# Patient Record
Sex: FEMALE | Race: WHITE | NOT HISPANIC OR LATINO | Employment: FULL TIME | URBAN - METROPOLITAN AREA
[De-identification: names, ages, dates, MRNs, and addresses within clinical notes are randomized per-mention and may not be internally consistent; named-entity substitution may affect disease eponyms.]

---

## 2019-02-06 ENCOUNTER — OFFICE VISIT (OUTPATIENT)
Dept: FAMILY MEDICINE | Facility: CLINIC | Age: 39
End: 2019-02-06
Payer: COMMERCIAL

## 2019-02-06 VITALS
DIASTOLIC BLOOD PRESSURE: 80 MMHG | WEIGHT: 155 LBS | HEIGHT: 58 IN | TEMPERATURE: 100 F | HEART RATE: 88 BPM | OXYGEN SATURATION: 98 % | BODY MASS INDEX: 32.54 KG/M2 | RESPIRATION RATE: 20 BRPM | SYSTOLIC BLOOD PRESSURE: 116 MMHG

## 2019-02-06 DIAGNOSIS — J02.8 SORE THROAT (VIRAL): Primary | ICD-10-CM

## 2019-02-06 DIAGNOSIS — B97.89 SORE THROAT (VIRAL): Primary | ICD-10-CM

## 2019-02-06 DIAGNOSIS — B34.9 VIRAL ILLNESS: ICD-10-CM

## 2019-02-06 LAB
CTP QC/QA: YES
S PYO RRNA THROAT QL PROBE: NEGATIVE

## 2019-02-06 PROCEDURE — 99203 PR OFFICE/OUTPT VISIT, NEW, LEVL III, 30-44 MIN: ICD-10-PCS | Mod: 25,S$GLB,, | Performed by: INTERNAL MEDICINE

## 2019-02-06 PROCEDURE — 99203 OFFICE O/P NEW LOW 30 MIN: CPT | Mod: 25,S$GLB,, | Performed by: INTERNAL MEDICINE

## 2019-02-06 PROCEDURE — 3008F PR BODY MASS INDEX (BMI) DOCUMENTED: ICD-10-PCS | Mod: CPTII,S$GLB,, | Performed by: INTERNAL MEDICINE

## 2019-02-06 PROCEDURE — 87880 POCT RAPID STREP A: ICD-10-PCS | Mod: QW,S$GLB,, | Performed by: INTERNAL MEDICINE

## 2019-02-06 PROCEDURE — 87880 STREP A ASSAY W/OPTIC: CPT | Mod: QW,S$GLB,, | Performed by: INTERNAL MEDICINE

## 2019-02-06 PROCEDURE — 96372 THER/PROPH/DIAG INJ SC/IM: CPT | Mod: S$GLB,,, | Performed by: INTERNAL MEDICINE

## 2019-02-06 PROCEDURE — 99999 PR PBB SHADOW E&M-NEW PATIENT-LVL III: CPT | Mod: PBBFAC,,, | Performed by: INTERNAL MEDICINE

## 2019-02-06 PROCEDURE — 3008F BODY MASS INDEX DOCD: CPT | Mod: CPTII,S$GLB,, | Performed by: INTERNAL MEDICINE

## 2019-02-06 PROCEDURE — 96372 PR INJECTION,THERAP/PROPH/DIAG2ST, IM OR SUBCUT: ICD-10-PCS | Mod: S$GLB,,, | Performed by: INTERNAL MEDICINE

## 2019-02-06 PROCEDURE — 99999 PR PBB SHADOW E&M-NEW PATIENT-LVL III: ICD-10-PCS | Mod: PBBFAC,,, | Performed by: INTERNAL MEDICINE

## 2019-02-06 RX ORDER — TRIAMCINOLONE ACETONIDE 40 MG/ML
40 INJECTION, SUSPENSION INTRA-ARTICULAR; INTRAMUSCULAR
Status: COMPLETED | OUTPATIENT
Start: 2019-02-06 | End: 2019-02-06

## 2019-02-06 RX ADMIN — TRIAMCINOLONE ACETONIDE 40 MG: 40 INJECTION, SUSPENSION INTRA-ARTICULAR; INTRAMUSCULAR at 01:02

## 2019-02-06 NOTE — PROGRESS NOTES
SUBJECTIVE     Chief Complaint   Patient presents with    Sore Throat    Generalized Body Aches    Fever       HPI  Crystal Flores is a 39 y.o. female with multiple medical diagnoses as listed in the medical history and problem list that presents for evaluation of URI x 2 days. Pt reports a sore throat and back pain. +fever of 101 at ~1am, chills, and night sweats. Pt has been taking Theraflu without relief of symptoms. Denies any recent travel. +sick contacts(co-workers with URI).    PAST MEDICAL HISTORY:  History reviewed. No pertinent past medical history.    PAST SURGICAL HISTORY:  Past Surgical History:   Procedure Laterality Date     SECTION      HYSTERECTOMY      TUBAL LIGATION         SOCIAL HISTORY:  Social History     Socioeconomic History    Marital status: Single     Spouse name: Not on file    Number of children: Not on file    Years of education: Not on file    Highest education level: Not on file   Social Needs    Financial resource strain: Not on file    Food insecurity - worry: Not on file    Food insecurity - inability: Not on file    Transportation needs - medical: Not on file    Transportation needs - non-medical: Not on file   Occupational History    Not on file   Tobacco Use    Smoking status: Never Smoker   Substance and Sexual Activity    Alcohol use: Not on file    Drug use: Not on file    Sexual activity: Not on file   Other Topics Concern    Not on file   Social History Narrative    Not on file       FAMILY HISTORY:  Family History   Problem Relation Age of Onset    Diabetes Maternal Grandmother     Stroke Maternal Grandmother        ALLERGIES AND MEDICATIONS: updated and reviewed.  Review of patient's allergies indicates:   Allergen Reactions    Codeine Hives     Current Outpatient Medications   Medication Sig Dispense Refill    ibuprofen (ADVIL,MOTRIN) 600 MG tablet Take 1 tablet (600 mg total) by mouth every 6 (six) hours as needed for Pain. 60 tablet 2  "    No current facility-administered medications for this visit.        ROS  Review of Systems   Constitutional: Positive for chills and fever.   HENT: Negative for hearing loss and sore throat.    Eyes: Negative for visual disturbance.   Respiratory: Negative for cough and shortness of breath.    Cardiovascular: Negative for chest pain, palpitations and leg swelling.   Gastrointestinal: Negative for abdominal pain, constipation, diarrhea, nausea and vomiting.   Genitourinary: Negative for dysuria, frequency and urgency.   Musculoskeletal: Positive for back pain. Negative for arthralgias, joint swelling and myalgias.   Skin: Negative for rash and wound.   Neurological: Negative for headaches.   Psychiatric/Behavioral: Negative for agitation and confusion. The patient is not nervous/anxious.          OBJECTIVE     Physical Exam  Vitals:    02/06/19 1325   BP: 116/80   Pulse: 88   Resp: 20   Temp: 99.6 °F (37.6 °C)    Body mass index is 32.39 kg/m².  Weight: 70.3 kg (154 lb 15.7 oz)   Height: 4' 10" (147.3 cm)     Physical Exam   Constitutional: She is oriented to person, place, and time. She appears well-developed and well-nourished. No distress.   HENT:   Head: Normocephalic and atraumatic.   Right Ear: Hearing, tympanic membrane and external ear normal.   Left Ear: Hearing, tympanic membrane and external ear normal.   Nose: Nose normal. No rhinorrhea. Right sinus exhibits no maxillary sinus tenderness and no frontal sinus tenderness. Left sinus exhibits no maxillary sinus tenderness and no frontal sinus tenderness.   Mouth/Throat: No uvula swelling. Posterior oropharyngeal erythema present. No posterior oropharyngeal edema.   Eyes: Conjunctivae and EOM are normal. Right eye exhibits no discharge. Left eye exhibits no discharge. No scleral icterus.   Neck: Normal range of motion. Neck supple. No JVD present. No tracheal deviation present.   Cardiovascular: Normal rate, regular rhythm and intact distal pulses. Exam " reveals no gallop and no friction rub.   No murmur heard.  Pulmonary/Chest: Effort normal and breath sounds normal. No respiratory distress. She has no wheezes.   Abdominal: Soft. Bowel sounds are normal. She exhibits no distension and no mass. There is no tenderness. There is no rebound and no guarding.   Musculoskeletal: Normal range of motion. She exhibits no edema, tenderness or deformity.   Neurological: She is alert and oriented to person, place, and time. She exhibits normal muscle tone. Coordination normal.   Skin: Skin is warm and dry. No rash noted. No erythema.   Psychiatric: She has a normal mood and affect. Her behavior is normal. Judgment and thought content normal.         Health Maintenance       Date Due Completion Date    Lipid Panel 1980 ---    TETANUS VACCINE 02/04/1998 ---    Influenza Vaccine 08/01/2018 ---    Pap Smear 07/02/2019 7/2/2018            ASSESSMENT     39 y.o. female with     1. Sore throat (viral)    2. Viral illness        PLAN:     1. Sore throat (viral)  - POCT Rapid Strep A; negative  - Salt water gargles    2. Viral illness  - Continue symptomatic treatment with rest, increase fluid intake, tylenol or ibuprofen PRN fever(temp >/= 100.4) or body aches. Okay to take OTC antihistamines, i.e. Bendaryl, Claritin, Allegra, etc. as needed.  - Okay to gargle with warm, salt water or use throat lozenges as needed  - triamcinolone acetonide injection 40 mg        RTC in 1-2 weeks as needed for any acute worsening of current condition or failure to improve       Isha Espinal MD  02/06/2019 1:32 PM        No Follow-up on file.

## 2019-03-07 ENCOUNTER — OFFICE VISIT (OUTPATIENT)
Dept: FAMILY MEDICINE | Facility: CLINIC | Age: 39
End: 2019-03-07
Payer: COMMERCIAL

## 2019-03-07 VITALS
HEART RATE: 78 BPM | BODY MASS INDEX: 32.39 KG/M2 | DIASTOLIC BLOOD PRESSURE: 84 MMHG | WEIGHT: 154.31 LBS | HEIGHT: 58 IN | OXYGEN SATURATION: 98 % | TEMPERATURE: 99 F | RESPIRATION RATE: 18 BRPM | SYSTOLIC BLOOD PRESSURE: 118 MMHG

## 2019-03-07 DIAGNOSIS — M62.838 NECK MUSCLE SPASM: ICD-10-CM

## 2019-03-07 DIAGNOSIS — M54.2 NECK PAIN ON LEFT SIDE: Primary | ICD-10-CM

## 2019-03-07 PROCEDURE — 99999 PR PBB SHADOW E&M-EST. PATIENT-LVL III: ICD-10-PCS | Mod: PBBFAC,,, | Performed by: INTERNAL MEDICINE

## 2019-03-07 PROCEDURE — 3008F PR BODY MASS INDEX (BMI) DOCUMENTED: ICD-10-PCS | Mod: CPTII,S$GLB,, | Performed by: INTERNAL MEDICINE

## 2019-03-07 PROCEDURE — 3008F BODY MASS INDEX DOCD: CPT | Mod: CPTII,S$GLB,, | Performed by: INTERNAL MEDICINE

## 2019-03-07 PROCEDURE — 99999 PR PBB SHADOW E&M-EST. PATIENT-LVL III: CPT | Mod: PBBFAC,,, | Performed by: INTERNAL MEDICINE

## 2019-03-07 PROCEDURE — 99214 OFFICE O/P EST MOD 30 MIN: CPT | Mod: S$GLB,,, | Performed by: INTERNAL MEDICINE

## 2019-03-07 PROCEDURE — 99214 PR OFFICE/OUTPT VISIT, EST, LEVL IV, 30-39 MIN: ICD-10-PCS | Mod: S$GLB,,, | Performed by: INTERNAL MEDICINE

## 2019-03-07 RX ORDER — CYCLOBENZAPRINE HCL 10 MG
10 TABLET ORAL 3 TIMES DAILY PRN
Qty: 30 TABLET | Refills: 0 | Status: SHIPPED | OUTPATIENT
Start: 2019-03-07 | End: 2019-03-17

## 2019-03-07 NOTE — LETTER
March 7, 2019      [facility]  [address]  [address]  [place] [address]  Phone: [phone]  Fax: [phone]       Patient: Crystal Flores   YOB: 1980  Date of Visit: 03/07/2019    To Whom It May Concern:    Sarah Flores  was at [facility] on 03/07/2019. She may return to work/school on 3/7/19 with restrictions. She can not do any heavy lifting for the next 1-2 weeks. If you have any questions or concerns, or if I can be of further assistance, please do not hesitate to contact me.    Sincerely,    Isha Espinal MD
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
## 2019-03-26 ENCOUNTER — OFFICE VISIT (OUTPATIENT)
Dept: FAMILY MEDICINE | Facility: CLINIC | Age: 39
End: 2019-03-26
Payer: COMMERCIAL

## 2019-03-26 VITALS
OXYGEN SATURATION: 97 % | TEMPERATURE: 98 F | BODY MASS INDEX: 31.47 KG/M2 | WEIGHT: 149.94 LBS | HEIGHT: 58 IN | DIASTOLIC BLOOD PRESSURE: 78 MMHG | SYSTOLIC BLOOD PRESSURE: 110 MMHG | HEART RATE: 76 BPM

## 2019-03-26 DIAGNOSIS — M50.30 DEGENERATIVE DISC DISEASE, CERVICAL: ICD-10-CM

## 2019-03-26 DIAGNOSIS — M54.2 NECK PAIN ON LEFT SIDE: Primary | ICD-10-CM

## 2019-03-26 PROCEDURE — 99999 PR PBB SHADOW E&M-EST. PATIENT-LVL III: CPT | Mod: PBBFAC,,, | Performed by: INTERNAL MEDICINE

## 2019-03-26 PROCEDURE — 99214 OFFICE O/P EST MOD 30 MIN: CPT | Mod: S$GLB,,, | Performed by: INTERNAL MEDICINE

## 2019-03-26 PROCEDURE — 3008F BODY MASS INDEX DOCD: CPT | Mod: CPTII,S$GLB,, | Performed by: INTERNAL MEDICINE

## 2019-03-26 PROCEDURE — 99214 PR OFFICE/OUTPT VISIT, EST, LEVL IV, 30-39 MIN: ICD-10-PCS | Mod: S$GLB,,, | Performed by: INTERNAL MEDICINE

## 2019-03-26 PROCEDURE — 3008F PR BODY MASS INDEX (BMI) DOCUMENTED: ICD-10-PCS | Mod: CPTII,S$GLB,, | Performed by: INTERNAL MEDICINE

## 2019-03-26 PROCEDURE — 99999 PR PBB SHADOW E&M-EST. PATIENT-LVL III: ICD-10-PCS | Mod: PBBFAC,,, | Performed by: INTERNAL MEDICINE

## 2019-03-26 RX ORDER — OXYBUTYNIN CHLORIDE 10 MG/1
TABLET, EXTENDED RELEASE ORAL
Refills: 11 | COMMUNITY
Start: 2019-03-24 | End: 2019-06-12

## 2019-03-26 RX ORDER — IBUPROFEN 800 MG/1
800 TABLET ORAL
COMMUNITY
End: 2019-06-12

## 2019-03-26 RX ORDER — METHYLPREDNISOLONE 4 MG/1
TABLET ORAL
Qty: 1 PACKAGE | Refills: 0 | Status: SHIPPED | OUTPATIENT
Start: 2019-03-26 | End: 2019-06-12
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
## 2019-03-26 NOTE — PROGRESS NOTES
Called pt and gave her results of xray. Will also send a letter. All questions/concerns addressed and pt voiced understanding.

## 2019-04-03 ENCOUNTER — CLINICAL SUPPORT (OUTPATIENT)
Dept: REHABILITATION | Facility: HOSPITAL | Age: 39
End: 2019-04-03
Attending: INTERNAL MEDICINE
Payer: COMMERCIAL

## 2019-04-03 DIAGNOSIS — Z78.9 ALTERATION IN INSTRUMENTAL ACTIVITIES OF DAILY LIVING (IADL): ICD-10-CM

## 2019-04-03 DIAGNOSIS — M25.60 RANGE OF MOTION DEFICIT: ICD-10-CM

## 2019-04-03 DIAGNOSIS — M54.2 NECK PAIN ON LEFT SIDE: ICD-10-CM

## 2019-04-03 DIAGNOSIS — R53.1 WEAKNESS: ICD-10-CM

## 2019-04-03 PROCEDURE — 97110 THERAPEUTIC EXERCISES: CPT | Mod: PN

## 2019-04-03 PROCEDURE — 97161 PT EVAL LOW COMPLEX 20 MIN: CPT | Mod: PN
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
## 2019-04-10 ENCOUNTER — CLINICAL SUPPORT (OUTPATIENT)
Dept: REHABILITATION | Facility: HOSPITAL | Age: 39
End: 2019-04-10
Attending: INTERNAL MEDICINE
Payer: COMMERCIAL

## 2019-04-10 DIAGNOSIS — R53.1 WEAKNESS: ICD-10-CM

## 2019-04-10 DIAGNOSIS — M25.60 RANGE OF MOTION DEFICIT: ICD-10-CM

## 2019-04-10 DIAGNOSIS — M54.2 NECK PAIN ON LEFT SIDE: Primary | ICD-10-CM

## 2019-04-10 PROCEDURE — 97110 THERAPEUTIC EXERCISES: CPT | Mod: PN

## 2019-04-10 PROCEDURE — 97140 MANUAL THERAPY 1/> REGIONS: CPT | Mod: PN

## 2019-04-10 NOTE — PROGRESS NOTES
"  Physical Therapy Daily Treatment Note     Name: Crystal Flores  Clinic Number: [id number]    Therapy Diagnosis:   Encounter Diagnoses   Name Primary?    Neck pain on left side Yes    Weakness     Range of motion deficit      Physician: Isha Espinal MD    Visit Date: 4/10/2019    Physician Orders: PT Eval and Treat   Medical Diagnosis from Referral: M54.2 (ICD-10-CM) - Neck pain on left side  Evaluation Date: 4/3/2019  Authorization Period Expiration: 12/31/2019  Plan of Care Expiration: 07/15/19  Visit # / Visits authorized: 2/ 20      Time In: 0805  Time Out: 0900  Total Billable Time: 55 minutes    Precautions: Standard    Subjective     Pt reports: that she had a rough night last night.  Pt states that she had a long dentist appt yesterday where her mouth was open for longer than 3 hours, which aggravated her neck.  She was compliant with home exercise program.  Response to previous treatment: ok  Functional change: none to report     Pain: 7-8/10  Location: left neck      Objective     Crystal received therapeutic exercises to develop strength, endurance, ROM, flexibility and posture for 40 minutes including:      Levator scapula stretch 2 x 30" B  L scalene stretch 2 x 30"   Seated scapular retractions 1 x 20 x 3"  Cervical retractions with R rotation 2 x  10 x 5" hold  Supine chin tuck 2 x 10 x 5" hold  Supine shoulder flexion with white dowel 2 x 10   Supine BUE External rotation 2 x 10    Supine Horizontal abduction 2 x 10   Supine pec stretch 1 x 1'         Crystal received the following manual therapy techniques: Manual traction were applied to the: cervical spine for 10 minutes, including:  Cervical distraction   STM to bilateral upper traps, CPS  Sub occipital release       Crystal received hot pack for 10 minutes to cervical spine.        Home Exercises Provided and Patient Education Provided     Education provided:   - postural awareness   - use of cervical roll in pillow for added support while in " bed.     Written Home Exercises Provided: Patient instructed to cont prior HEP.  Exercises were reviewed and Crystal was able to demonstrate them prior to the end of the session.  Crystal demonstrated good  understanding of the education provided.     See EMR under Patient Instructions for exercises provided prior visit.    Assessment     Crystal tolerated treatment session well today.  Pt with good response to exercises without exacerbation of symptoms and appropriate training effect achieved.  Patient with increased tissue restrictions throughout the cervical paraspinals and bilateral upper traps.  Patient with great response to manual care with increased tissue extensibility and decreased pain achieved.    Crystal is progressing well towards her goals.   Pt prognosis is Good.     Pt will continue to benefit from skilled outpatient physical therapy to address the deficits listed in the problem list box on initial evaluation, provide pt/family education and to maximize pt's level of independence in the home and community environment.     Pt's spiritual, cultural and educational needs considered and pt agreeable to plan of care and goals.     Anticipated barriers to physical therapy: none        GOALS: Short Term Goals: 4 weeks  1.Report decreased in pain at worse less than  <   / =  6  /10  to increase tolerance for functional activities  - ongoing  2. Pt to improve cervical rotation and extension range of motion by 10% to allow for improved functional mobility to allow for improvement in IADLs.   ongoing  3. Increased gross cervical MMT 1/2  grade to increase tolerance for ADL and work activities.   ongoing  4. Pt to report an improved ability to fall asleep at night with decreased frequency of symptoms. ongoing  5. Pt to tolerate HEP to improve ROM and independence with ADL's   ongoing        Long Term Goals: 8 weeks  1.Report decreased in pain at worst less than  <   / = 2 /10  to increase tolerance for functional  activities    ongoing  2.Pt to improve B cervical rotation and extension active range of motion to WFL to allow for improved functional mobility to allow for improvement in IADLs.   ongoing  3.Increased gross cervical MMT  1 grade  to increase tolerance for ADL and work activities.   ongoing  4.  Pt will report at 36% level (20-40% impaired) on FOTO neck survey score for neck pain disability to demonstrate decrease in disability and improvement in neck pain.   ongoing  5. Pt to be Independent with HEP to improve ROM and independence with ADL's    ongoing  6. Increased MMT  for lower middle traps to > or = 4+/5 to increase tolerance for ADL and improve posture.   ongoing  7. Patient will improve L  strength with elbow bent position to reach 30# or better for improved ability to open jars and decrease frequency of dropping items.   Ongoing    8. Patient will be able to reach overhead with LUE for reaching into cabinets to demonstrate return to PLOF   ongoing  9. Patient will demonstrate lifting 10# with LUE without provoking symptoms greater than 2/10.    ongoing      Plan     Continue with current POC.   Progress with postural awareness activities.     Jacqueline Mckeon, PTA

## 2019-04-16 ENCOUNTER — CLINICAL SUPPORT (OUTPATIENT)
Dept: REHABILITATION | Facility: HOSPITAL | Age: 39
End: 2019-04-16
Attending: INTERNAL MEDICINE
Payer: COMMERCIAL

## 2019-04-16 DIAGNOSIS — M25.60 RANGE OF MOTION DEFICIT: ICD-10-CM

## 2019-04-16 DIAGNOSIS — M54.2 NECK PAIN ON LEFT SIDE: Primary | ICD-10-CM

## 2019-04-16 DIAGNOSIS — R53.1 WEAKNESS: ICD-10-CM

## 2019-04-16 PROCEDURE — 97140 MANUAL THERAPY 1/> REGIONS: CPT | Mod: PN

## 2019-04-16 PROCEDURE — 97110 THERAPEUTIC EXERCISES: CPT | Mod: PN

## 2019-04-16 NOTE — PROGRESS NOTES
"  Physical Therapy Daily Treatment Note     Name: Crystal Flores  Clinic Number: [id number]    Therapy Diagnosis:   Encounter Diagnoses   Name Primary?    Neck pain on left side Yes    Weakness     Range of motion deficit      Physician: Isha Espinal MD    Visit Date: 4/16/2019    Physician Orders: PT Eval and Treat   Medical Diagnosis from Referral: M54.2 (ICD-10-CM) - Neck pain on left side  Evaluation Date: 4/3/2019  Authorization Period Expiration: 12/31/2019  Plan of Care Expiration: 07/15/19  Visit # / Visits authorized: 3/ 20      Time In: 1600  Time Out: 1655  Total Billable Time: 55 minutes    Precautions: Standard    Subjective     Pt reports: that she worked a double last night and she hurt so bad she couldn't lift her arm over her head. She felt the towel under her neck helps and that the exercises help, too.   She was compliant with home exercise program.  Response to previous treatment: ok  Functional change: none to report     Pain: 6/10  Location: left neck      Objective     Crystal received therapeutic exercises to develop strength, endurance, ROM, flexibility and posture for 40 minutes including:    +UBE 6'; 3 fwd/bwd  Levator scapula stretch 2 x 30" B  L scalene stretch 2 x 30"   Seated scapular retractions 1 x 20 x 3"  Cervical retractions with R rotation 2 x  10 x 5" hold  +Cervical rotations L with facet blocking 10 x 3" hold   +Median nerve glide 1 x 20  Supine chin tuck 2 x 10 x 5" hold  Supine shoulder flexion with white dowel 2 x 10    Supine BUE External rotation 2 x 10     Supine Horizontal abduction 2 x 10   Supine pec stretch 1 x 1'         Crystal received the following manual therapy techniques: Manual traction were applied to the: cervical spine for 15 minutes, including:  Cervical distraction   STM to bilateral upper traps, CPS  Sub occipital release       Crystal received hot pack for 0 minutes to cervical spine.        Home Exercises Provided and Patient Education Provided "     Education provided:   - postural awareness   - use of cervical roll in pillow for added support while in bed.     Written Home Exercises Provided: Patient instructed to cont prior HEP.  Exercises were reviewed and Crystal was able to demonstrate them prior to the end of the session.  Crystal demonstrated good  understanding of the education provided.     See EMR under Patient Instructions for exercises provided prior visit.    Assessment     Crystal tolerated treatment session well today. New exercises added per POC. Min VC and demonstration with good carryover on performance. Facet blocking with improved L cervical rotation with significant decrease in pain noted.  Patient with continued STR in B shoulders. She received manual techniques to address this with good response reporting decreased pain at exit.      Crystal is progressing well towards her goals.   Pt prognosis is Good.     Pt will continue to benefit from skilled outpatient physical therapy to address the deficits listed in the problem list box on initial evaluation, provide pt/family education and to maximize pt's level of independence in the home and community environment.     Pt's spiritual, cultural and educational needs considered and pt agreeable to plan of care and goals.     Anticipated barriers to physical therapy: none        GOALS: Short Term Goals: 4 weeks  1.Report decreased in pain at worse less than  <   / =  6  /10  to increase tolerance for functional activities  - ongoing  2. Pt to improve cervical rotation and extension range of motion by 10% to allow for improved functional mobility to allow for improvement in IADLs.   ongoing  3. Increased gross cervical MMT 1/2  grade to increase tolerance for ADL and work activities.   ongoing  4. Pt to report an improved ability to fall asleep at night with decreased frequency of symptoms. ongoing  5. Pt to tolerate HEP to improve ROM and independence with ADL's   ongoing        Long Term Goals:  8 weeks  1.Report decreased in pain at worst less than  <   / = 2 /10  to increase tolerance for functional activities    ongoing  2.Pt to improve B cervical rotation and extension active range of motion to WFL to allow for improved functional mobility to allow for improvement in IADLs.   ongoing  3.Increased gross cervical MMT  1 grade  to increase tolerance for ADL and work activities.   ongoing  4.  Pt will report at 36% level (20-40% impaired) on FOTO neck survey score for neck pain disability to demonstrate decrease in disability and improvement in neck pain.   ongoing  5. Pt to be Independent with HEP to improve ROM and independence with ADL's    ongoing  6. Increased MMT  for lower middle traps to > or = 4+/5 to increase tolerance for ADL and improve posture.   ongoing  7. Patient will improve L  strength with elbow bent position to reach 30# or better for improved ability to open jars and decrease frequency of dropping items.   Ongoing    8. Patient will be able to reach overhead with LUE for reaching into cabinets to demonstrate return to PLOF   ongoing  9. Patient will demonstrate lifting 10# with LUE without provoking symptoms greater than 2/10.    ongoing      Plan     Continue with current POC.   Progress with postural awareness activities.     Saroj Hernandez, PT

## 2019-04-18 ENCOUNTER — CLINICAL SUPPORT (OUTPATIENT)
Dept: REHABILITATION | Facility: HOSPITAL | Age: 39
End: 2019-04-18
Attending: INTERNAL MEDICINE
Payer: COMMERCIAL

## 2019-04-18 DIAGNOSIS — R53.1 WEAKNESS: ICD-10-CM

## 2019-04-18 DIAGNOSIS — Z78.9 ALTERATION IN INSTRUMENTAL ACTIVITIES OF DAILY LIVING (IADL): ICD-10-CM

## 2019-04-18 DIAGNOSIS — M54.2 NECK PAIN ON LEFT SIDE: Primary | ICD-10-CM

## 2019-04-18 DIAGNOSIS — M25.60 RANGE OF MOTION DEFICIT: ICD-10-CM

## 2019-04-18 PROCEDURE — 97140 MANUAL THERAPY 1/> REGIONS: CPT | Mod: PN

## 2019-04-18 PROCEDURE — 97110 THERAPEUTIC EXERCISES: CPT | Mod: PN

## 2019-04-18 NOTE — PROGRESS NOTES
"  Physical Therapy Daily Treatment Note     Name: Crystal Flores  Clinic Number: [id number]    Therapy Diagnosis:   Encounter Diagnoses   Name Primary?    Neck pain on left side Yes    Weakness     Range of motion deficit     Alteration in instrumental activities of daily living (IADL)      Physician: Isha Espinal MD    Visit Date: 4/18/2019    Physician Orders: PT Eval and Treat   Medical Diagnosis from Referral: M54.2 (ICD-10-CM) - Neck pain on left side  Evaluation Date: 4/3/2019  Authorization Period Expiration: 12/31/2019  Plan of Care Expiration: 7/15/19  Visit # / Visits authorized: 4 / 20    Time In: 0800  Time Out: 0900  Total Billable Time: 38 minutes    Precautions: Standard    Subjective     Pt reports: that she is having increased pain and tightness/stiffness this morning. Patient reports that she always feels better until she works a long shift at work.  She was compliant with home exercise program.  Response to previous treatment: good, improvement in symptoms  Functional change: none to report     Pain: 6/10  Location: Left neck, shoulder    Objective     Crystal received therapeutic exercises to develop strength, endurance, ROM, flexibility and posture for 35 minutes including:    Warm-up: UBE x  3' fwd, 3' bwd    Levator scapula stretch: 2 x 30" B  L scalene stretch: 2 x 30"   Seated scapular retractions: 20 x 3" hold  Seated cervical retractions with R rotation: 2 x 10 x 5" hold  Cervical rotations L with facet blocking: 10 x 3" hold   Median nerve glide 1 x 20    Supine chin tuck: 2 x 10 x 5" hold  Supine shoulder flexion with white dowel: 2 x 10    Supine BUE external rotation: 2 x 10 - no resistance  Supine horizontal abduction: 2 x 10   Supine pec stretch: 1 minute    Crystal received the following manual therapy techniques: Manual traction were applied to the: cervical spine for 15 minutes, including:  Cervical distraction   STM to bilateral upper traps, CPS  Suboccipital release     Crystal " "received cold pack for 10 minutes to cervical spine.    Home Exercises Provided and Patient Education Provided.    Education provided:   - postural awareness   - use of cervical roll in pillow for added support while in bed.     Written Home Exercises Provided: Patient instructed to cont prior HEP.  Exercises were reviewed and Crystal was able to demonstrate them prior to the end of the session.  Crystal demonstrated good  understanding of the education provided.     See EMR under Patient Instructions for exercises provided prior visit.    Assessment     Patient tolerated treatment session fairly well today. Good tolerance to exercises performed with no exacerbation of cervical spine pain. Increased soft tissue restrictions noted in Left upper trap with good response to manual care. Patient noted increased discomfort and a "pinching" pain in the Left side of her neck with cervical rotations with facet blocking. Cervical spine distraction was performed with patient report of decrease in subjective complaints. Consider progression of postural exercises next visit.  Crystal is progressing well towards her goals.   Pt prognosis is Good.     Pt will continue to benefit from skilled outpatient physical therapy to address the deficits listed in the problem list box on initial evaluation, provide pt/family education and to maximize pt's level of independence in the home and community environment.     Pt's spiritual, cultural and educational needs considered and pt agreeable to plan of care and goals.     Anticipated barriers to physical therapy: none     GOALS: Short Term Goals: 4 weeks  1.Report decreased in pain at worse less than  <   / =  6  /10  to increase tolerance for functional activities  - ongoing  2. Pt to improve cervical rotation and extension range of motion by 10% to allow for improved functional mobility to allow for improvement in IADLs.   ongoing  3. Increased gross cervical MMT 1/2  grade to increase " tolerance for ADL and work activities.   ongoing  4. Pt to report an improved ability to fall asleep at night with decreased frequency of symptoms. ongoing  5. Pt to tolerate HEP to improve ROM and independence with ADL's   ongoing     Long Term Goals: 8 weeks  1.Report decreased in pain at worst less than  <   / = 2 /10  to increase tolerance for functional activities    ongoing  2.Pt to improve B cervical rotation and extension active range of motion to WFL to allow for improved functional mobility to allow for improvement in IADLs.   ongoing  3.Increased gross cervical MMT  1 grade  to increase tolerance for ADL and work activities.   ongoing  4.  Pt will report at 36% level (20-40% impaired) on FOTO neck survey score for neck pain disability to demonstrate decrease in disability and improvement in neck pain.   ongoing  5. Pt to be Independent with HEP to improve ROM and independence with ADL's    ongoing  6. Increased MMT  for lower middle traps to > or = 4+/5 to increase tolerance for ADL and improve posture.   ongoing  7. Patient will improve L  strength with elbow bent position to reach 30# or better for improved ability to open jars and decrease frequency of dropping items.   Ongoing    8. Patient will be able to reach overhead with LUE for reaching into cabinets to demonstrate return to PLOF   ongoing  9. Patient will demonstrate lifting 10# with LUE without provoking symptoms greater than 2/10.    ongoing    Plan     Continue with current POC. Progress with postural awareness activities.     Chantal Mendenhall, PTA   04/18/2019

## 2019-04-22 ENCOUNTER — CLINICAL SUPPORT (OUTPATIENT)
Dept: REHABILITATION | Facility: HOSPITAL | Age: 39
End: 2019-04-22
Attending: INTERNAL MEDICINE
Payer: COMMERCIAL

## 2019-04-22 DIAGNOSIS — R53.1 WEAKNESS: ICD-10-CM

## 2019-04-22 DIAGNOSIS — M25.60 RANGE OF MOTION DEFICIT: ICD-10-CM

## 2019-04-22 DIAGNOSIS — M54.2 NECK PAIN ON LEFT SIDE: Primary | ICD-10-CM

## 2019-04-22 PROCEDURE — 97110 THERAPEUTIC EXERCISES: CPT | Mod: PN

## 2019-04-22 PROCEDURE — 97140 MANUAL THERAPY 1/> REGIONS: CPT | Mod: PN

## 2019-04-22 NOTE — PROGRESS NOTES
"  Physical Therapy Daily Treatment Note     Name: Crystal Flores  Clinic Number: [id number]    Therapy Diagnosis:   Encounter Diagnoses   Name Primary?    Neck pain on left side Yes    Weakness     Range of motion deficit      Physician: Isha Espinal MD    Visit Date: 4/22/2019    Physician Orders: PT Eval and Treat   Medical Diagnosis from Referral: M54.2 (ICD-10-CM) - Neck pain on left side  Evaluation Date: 4/3/2019  Authorization Period Expiration: 12/31/2019  Plan of Care Expiration: 7/15/19  Visit # / Visits authorized: 5 / 20    Time In: 0830  Time Out: 0922  Total Billable Time: 36 minutes    Precautions: Standard    Subjective     Pt reports: that she is having minimal pain today because she was off of work yesterday. Patient states that yesterday prior to going to bed she noticed some swelling along her Left jaw line, but after taking Ibuprofen and going to sleep she woke up and it was gone.   She was compliant with home exercise program.  Response to previous treatment: good, improvement in symptoms  Functional change: none to report     Pain: 4/10  Location: Left neck, shoulder    Objective     Crystal received therapeutic exercises to develop strength, endurance, ROM, flexibility and posture for 29 minutes including:    Warm-up: UBE x  3' fwd, 3' bwd    Levator scapula stretch: 2 x 30" B  L scalene stretch: 2 x 30"   Seated scapular retractions: 20 x 3" hold  Seated cervical retractions with R rotation: 2 x 10 x 5" hold  Cervical rotations L with facet blocking: 10 x 3" hold - held off secondary to c/o increased Left neck pain  Median nerve glide: 1 x 20    Supine chin tuck: 2 x 10 x 5" hold  Supine shoulder flexion with white dowel: 2 x 10    Supine BUE external rotation: 2 x 10 - no resistance  Supine horizontal abduction: 2 x 10   Supine pec stretch: 1 minute    Consider adding next visit: theraband rows/extension, self scap mob on 1/2 foam: bear hugs, serratus punchouts, shoulder rolls, alt UE " "flexion, horizontal abduction, double shoulder ER    Crystal received the following manual therapy techniques: Manual traction were applied to the: cervical spine for 15 minutes, including:  Cervical distraction   STM to bilateral upper traps, CPS  Suboccipital release     Crystal received hot pack for 8 minutes to cervical spine.    Home Exercises Provided and Patient Education Provided.    Education provided:   - postural awareness   - use of cervical roll in pillow for added support while in bed.     Written Home Exercises Provided: Patient instructed to cont prior HEP.  Exercises were reviewed and Crystal was able to demonstrate them prior to the end of the session.  Crystal demonstrated good  understanding of the education provided.     See EMR under Patient Instructions for exercises provided prior visit.    Assessment     Patient with good tolerance to treatment reporting an improvement in subjective complaints at the end of session. Cervical rotations with L facet blocking held off this session secondary to patient c/o "pinching" pain after exercise. Patient with palpable trigger point in Left scalene with good response to manual care. Patient will benefit from progression of postural exercises next visit per pt tolerance.   Crystal is progressing well towards her goals.   Pt prognosis is Good.     Pt will continue to benefit from skilled outpatient physical therapy to address the deficits listed in the problem list box on initial evaluation, provide pt/family education and to maximize pt's level of independence in the home and community environment.     Pt's spiritual, cultural and educational needs considered and pt agreeable to plan of care and goals.     Anticipated barriers to physical therapy: none     GOALS: Short Term Goals: 4 weeks  1.Report decreased in pain at worse less than  <   / =  6  /10  to increase tolerance for functional activities  - ongoing  2. Pt to improve cervical rotation and extension " range of motion by 10% to allow for improved functional mobility to allow for improvement in IADLs.   ongoing  3. Increased gross cervical MMT 1/2  grade to increase tolerance for ADL and work activities.   ongoing  4. Pt to report an improved ability to fall asleep at night with decreased frequency of symptoms. ongoing  5. Pt to tolerate HEP to improve ROM and independence with ADL's   ongoing     Long Term Goals: 8 weeks  1.Report decreased in pain at worst less than  <   / = 2 /10  to increase tolerance for functional activities    ongoing  2.Pt to improve B cervical rotation and extension active range of motion to WFL to allow for improved functional mobility to allow for improvement in IADLs.   ongoing  3.Increased gross cervical MMT  1 grade  to increase tolerance for ADL and work activities.   ongoing  4.  Pt will report at 36% level (20-40% impaired) on FOTO neck survey score for neck pain disability to demonstrate decrease in disability and improvement in neck pain.   ongoing  5. Pt to be Independent with HEP to improve ROM and independence with ADL's    ongoing  6. Increased MMT  for lower middle traps to > or = 4+/5 to increase tolerance for ADL and improve posture.   ongoing  7. Patient will improve L  strength with elbow bent position to reach 30# or better for improved ability to open jars and decrease frequency of dropping items.   Ongoing    8. Patient will be able to reach overhead with LUE for reaching into cabinets to demonstrate return to PLOF   ongoing  9. Patient will demonstrate lifting 10# with LUE without provoking symptoms greater than 2/10.    ongoing    Plan     Continue with current POC. Progress with postural awareness activities.     Chantal Mendenhall, PTA   04/22/2019

## 2019-04-24 ENCOUNTER — CLINICAL SUPPORT (OUTPATIENT)
Dept: REHABILITATION | Facility: HOSPITAL | Age: 39
End: 2019-04-24
Attending: INTERNAL MEDICINE
Payer: COMMERCIAL

## 2019-04-24 DIAGNOSIS — R53.1 WEAKNESS: ICD-10-CM

## 2019-04-24 DIAGNOSIS — M25.60 RANGE OF MOTION DEFICIT: ICD-10-CM

## 2019-04-24 DIAGNOSIS — M54.2 NECK PAIN ON LEFT SIDE: Primary | ICD-10-CM

## 2019-04-24 PROCEDURE — 97110 THERAPEUTIC EXERCISES: CPT | Mod: PN

## 2019-04-24 PROCEDURE — 97140 MANUAL THERAPY 1/> REGIONS: CPT | Mod: PN

## 2019-04-24 NOTE — PROGRESS NOTES
"  Physical Therapy Daily Treatment Note     Name: Crystal Flores  Clinic Number: [id number]    Therapy Diagnosis:   Encounter Diagnoses   Name Primary?    Neck pain on left side Yes    Weakness     Range of motion deficit      Physician: Isha Espinal MD    Visit Date: 4/24/2019    Physician Orders: PT Eval and Treat   Medical Diagnosis from Referral: M54.2 (ICD-10-CM) - Neck pain on left side  Evaluation Date: 4/3/2019  Authorization Period Expiration: 12/31/2019  Plan of Care Expiration: 7/15/19  Visit # / Visits authorized: 6 / 20    Time In: 0800  Time Out: 0905  Total Billable Time: 30 minutes    Precautions: Standard    Subjective     Pt reports: that she had a lot of pain yesterday secondary working a double at work. Patient reports that she does not feel good today and think she is suffering from sinuses.  She was compliant with home exercise program.  Response to previous treatment: good, improvement in symptoms  Functional change: none to report     Pain: 3/10  Location: Left neck, shoulder    Objective     Crystal received therapeutic exercises to develop strength, endurance, ROM, flexibility and posture for 40 minutes including:    Warm-up: UBE x  3' fwd, 3' bwd    Levator scapula stretch: 2 x 30" B  L scalene stretch: 2 x 30"   Seated scapular retractions: 20 x 3" hold - d/c to HEP  Seated cervical retractions with R rotation: 2 x 10 x 5" hold  Cervical rotations L with facet blocking: 10 x 3" hold - held off secondary to c/o increased Left neck pain  Median nerve glide: 1 x 20    Supine chin tuck: 2 x 10 x 5" hold  Supine shoulder flexion with white dowel: 2 x 10    +Supine self scap mob series on 1/2 foam:   Pec stretch: 1 minute   Bear hugs: 20x   Serratus punchouts: 20x   Shoulder rolls: 20x   Alt UE flexion: 20x   Horizontal abduction: 20x - no resistance   Double shoulder ER: 20x - no resistance    +Standing rows: Yellow TB x 20  +Standing extension: Yellow TB x 20    Crystal received the " following manual therapy techniques: Manual traction were applied to the: cervical spine for 15 minutes, including:  Cervical distraction   STM to bilateral upper traps, CPS  Suboccipital release     Crystal received hot pack for 10 minutes to cervical spine.    Home Exercises Provided and Patient Education Provided.    Education provided:   - postural awareness   - use of cervical roll in pillow for added support while in bed.     Written Home Exercises Provided: Patient instructed to cont prior HEP.  Exercises were reviewed and Crystal was able to demonstrate them prior to the end of the session.  Crystal demonstrated good  understanding of the education provided.     See EMR under Patient Instructions for exercises provided prior visit.    Assessment     Patient with good tolerance to progression of postural exercises this session reporting appropriate muscle fatigue. Patient with improved posture this session requiring minimal verbal cueing for postural awareness.   Crystal is progressing well towards her goals.   Pt prognosis is Good.     Pt will continue to benefit from skilled outpatient physical therapy to address the deficits listed in the problem list box on initial evaluation, provide pt/family education and to maximize pt's level of independence in the home and community environment.     Pt's spiritual, cultural and educational needs considered and pt agreeable to plan of care and goals.     Anticipated barriers to physical therapy: none     GOALS: Short Term Goals: 4 weeks  1.Report decreased in pain at worse less than  <   / =  6  /10  to increase tolerance for functional activities  - ongoing  2. Pt to improve cervical rotation and extension range of motion by 10% to allow for improved functional mobility to allow for improvement in IADLs.   ongoing  3. Increased gross cervical MMT 1/2  grade to increase tolerance for ADL and work activities.   ongoing  4. Pt to report an improved ability to fall asleep  at night with decreased frequency of symptoms. ongoing  5. Pt to tolerate HEP to improve ROM and independence with ADL's   ongoing     Long Term Goals: 8 weeks  1.Report decreased in pain at worst less than  <   / = 2 /10  to increase tolerance for functional activities    ongoing  2.Pt to improve B cervical rotation and extension active range of motion to WFL to allow for improved functional mobility to allow for improvement in IADLs.   ongoing  3.Increased gross cervical MMT  1 grade  to increase tolerance for ADL and work activities.   ongoing  4.  Pt will report at 36% level (20-40% impaired) on FOTO neck survey score for neck pain disability to demonstrate decrease in disability and improvement in neck pain.   ongoing  5. Pt to be Independent with HEP to improve ROM and independence with ADL's    ongoing  6. Increased MMT  for lower middle traps to > or = 4+/5 to increase tolerance for ADL and improve posture.   ongoing  7. Patient will improve L  strength with elbow bent position to reach 30# or better for improved ability to open jars and decrease frequency of dropping items.   Ongoing    8. Patient will be able to reach overhead with LUE for reaching into cabinets to demonstrate return to PLOF   ongoing  9. Patient will demonstrate lifting 10# with LUE without provoking symptoms greater than 2/10.    ongoing    Plan     Continue with current POC.     Chantal Mendenhall, PTA   04/24/2019

## 2019-04-29 ENCOUNTER — CLINICAL SUPPORT (OUTPATIENT)
Dept: REHABILITATION | Facility: HOSPITAL | Age: 39
End: 2019-04-29
Attending: INTERNAL MEDICINE
Payer: COMMERCIAL

## 2019-04-29 DIAGNOSIS — M54.2 NECK PAIN: Primary | ICD-10-CM

## 2019-04-29 PROCEDURE — 97110 THERAPEUTIC EXERCISES: CPT | Mod: PN

## 2019-04-29 NOTE — PROGRESS NOTES
"  Physical Therapy Daily Treatment Note     Name: Crystal Flores  Clinic Number: [id number]    Therapy Diagnosis:   Encounter Diagnosis   Name Primary?    Neck pain Yes     Physician: Isha Espinal MD    Visit Date: 4/29/2019    Physician Orders: PT Eval and Treat   Medical Diagnosis from Referral: M54.2 (ICD-10-CM) - Neck pain on left side  Evaluation Date: 4/3/2019  Authorization Period Expiration: 12/31/2019  Plan of Care Expiration: 7/15/19  Visit # / Visits authorized: 7 / 20    Time In: 1542  Time Out: 1636  Total Billable Time: 30 minutes    Precautions: Standard    Subjective     Pt reports:Increase in pain over the weekend. She was riding on a boat. She thinks the jolting from the waves increased her pain.     She was compliant with home exercise program.  Response to previous treatment: good, improvement in symptoms  Functional change: none to report     Pain: 5/10  Location: Left neck, shoulder    Objective     Crystal received therapeutic exercises to develop strength, endurance, ROM, flexibility and posture for 30 minutes including:    Warm-up: UBE x  3' fwd, 3' bwd    Levator scapula stretch: 2 x 30" B  L scalene stretch: 2 x 30"   Seated scapular retractions: 20 x 3" hold - d/c to HEP  Seated cervical retractions with R rotation: 2 x 10 x 5" hold  Cervical rotations L with facet blocking: 10 x 3" hold - held off secondary to c/o increased Left neck pain  Median nerve glide: 1 x 20    Supine chin tuck: 2 x 10 x 5" hold  Supine shoulder flexion with white dowel: 2 x 10    +Supine self scap mob series on 1/2 foam:   Pec stretch: 1 minute   Bear hugs: 20x   Serratus punchouts: 20x   Shoulder rolls: 20x   Alt UE flexion: 20x   Horizontal abduction: 20x - no resistance   Double shoulder ER: 20x - no resistance    +Standing rows: Yellow TB x 20  +Standing extension: Yellow TB x 20    Crystal received the following manual therapy techniques: Manual traction were applied to the: cervical spine for 25 minutes, " including:  Cervical distraction   STM to bilateral upper traps, CPS  Suboccipital release   +dry needling to L upper trap, sub occipitals, and L cervical paraspinals    Crystal received hot pack for 0 minutes to cervical spine.    Home Exercises Provided and Patient Education Provided.    Education provided:   - postural awareness   - use of cervical roll in pillow for added support while in bed.   -benefits/risks of dry needling  -expected muscle soreness following dry needling    Written Home Exercises Provided: Patient instructed to cont prior HEP.  Exercises were reviewed and Crystal was able to demonstrate them prior to the end of the session.  Crystal demonstrated good  understanding of the education provided.     See EMR under Patient Instructions for exercises provided prior visit.    Assessment     Patient presents to clinic with increased neck pain from over the weekend. Introduced pt to dry needling at this session. Performed all dry needling with pt in prone. L upper traps performed with pistoning technique. Localized twitch responses noted. Performed sub occipitals with periosteal pecking technique. Placed 4 needles in cervical paraspinals from C3-C6 using twisting technique. Left needles in cervical paraspinals and sub occipitals for 6 mins. Pt reports minimal light headedness when coming from prone > sitting. Light headedness could be orthostasis or slight sympathetic response from dry needling. Allowed pt time to rest and gave her water. Assessed pt during rest break. Pt later reported increased soreness in L upper trap following treatment session.     Crystal is progressing well towards her goals.   Pt prognosis is Good.     Pt will continue to benefit from skilled outpatient physical therapy to address the deficits listed in the problem list box on initial evaluation, provide pt/family education and to maximize pt's level of independence in the home and community environment.     Pt's spiritual,  cultural and educational needs considered and pt agreeable to plan of care and goals.     Anticipated barriers to physical therapy: none     GOALS: Short Term Goals: 4 weeks  1.Report decreased in pain at worse less than  <   / =  6  /10  to increase tolerance for functional activities  - ongoing  2. Pt to improve cervical rotation and extension range of motion by 10% to allow for improved functional mobility to allow for improvement in IADLs.   ongoing  3. Increased gross cervical MMT 1/2  grade to increase tolerance for ADL and work activities.   ongoing  4. Pt to report an improved ability to fall asleep at night with decreased frequency of symptoms. ongoing  5. Pt to tolerate HEP to improve ROM and independence with ADL's   ongoing     Long Term Goals: 8 weeks  1.Report decreased in pain at worst less than  <   / = 2 /10  to increase tolerance for functional activities    ongoing  2.Pt to improve B cervical rotation and extension active range of motion to WFL to allow for improved functional mobility to allow for improvement in IADLs.   ongoing  3.Increased gross cervical MMT  1 grade  to increase tolerance for ADL and work activities.   ongoing  4.  Pt will report at 36% level (20-40% impaired) on FOTO neck survey score for neck pain disability to demonstrate decrease in disability and improvement in neck pain.   ongoing  5. Pt to be Independent with HEP to improve ROM and independence with ADL's    ongoing  6. Increased MMT  for lower middle traps to > or = 4+/5 to increase tolerance for ADL and improve posture.   ongoing  7. Patient will improve L  strength with elbow bent position to reach 30# or better for improved ability to open jars and decrease frequency of dropping items.   Ongoing    8. Patient will be able to reach overhead with LUE for reaching into cabinets to demonstrate return to PLOF   ongoing  9. Patient will demonstrate lifting 10# with LUE without provoking symptoms greater than 2/10.     ongoing    Plan   Plan of Care Certification period: 4/3/19 - 7/15/19    Continue with current POC. Assess pt's response to dry needling.     Vinicius Matute, PT, DPT  04/29/2019

## 2019-05-09 NOTE — PROGRESS NOTES
"  Physical Therapy Daily Treatment Note     Name: Crystal Flores  Clinic Number: [id number]    Therapy Diagnosis:   Encounter Diagnosis   Name Primary?    Neck pain      Physician: Isha Espinal MD    Visit Date: 5/10/2019    Physician Orders: PT Eval and Treat   Medical Diagnosis from Referral: M54.2 (ICD-10-CM) - Neck pain on left side  Evaluation Date: 4/3/2019  Authorization Period Expiration: 12/31/2019  Plan of Care Expiration: 7/15/19  Visit # / Visits authorized: 8 / 20    Time In: 658  Time Out: 753  Total Billable Time: 40 minutes    Precautions: Standard    Subjective     Pt reports: Felt really good after the dry needling. She was really sore but then she could move her neck a lot better without pain. She states she had increase in pain after being off of therapy for a while.      She was compliant with home exercise program.  Response to previous treatment: good, improvement in symptoms  Functional change: none to report     Pain: 6/10  Location: Left neck, shoulder    Objective     Crystal received therapeutic exercises to develop strength, endurance, ROM, flexibility and posture for 30 minutes including:    Warm-up: UBE x  3' fwd, 3' bwd  +upper trap stretch 2x20" B  +cervical self SNAGs x10 B  +cervical extension c/ towel x10   Levator scapula stretch: 2 x 30" B  L scalene stretch: 2 x 30"   Seated scapular retractions: 20 x 3" hold - d/c to HEP  Seated cervical retractions with R rotation: 2 x 10 x 5" hold  Cervical rotations L with facet blocking: 10 x 3" hold - held off secondary to c/o increased Left neck pain  Median nerve glide: 1 x 20    Supine chin tuck: 2 x 10 x 5" hold  Supine shoulder flexion with white dowel: 2 x 10    Supine self scap mob series on 1/2 foam:   Pec stretch: 1 minute   Bear hugs: 20x   Serratus punchouts: 20x   Shoulder rolls: 20x   Alt UE flexion: 20x   Horizontal abduction: 20x - no resistance   Double shoulder ER: 20x - no resistance    Standing rows: RTB 2 x " 20  Standing extension: RTB 2 x 20    Crystal received the following manual therapy techniques: Manual traction were applied to the: cervical spine for 25 minutes, including:  Cervical distraction   STM to bilateral upper traps, CPS  Suboccipital release     Patient provided written and verbal consent to receive functional dry needling at today's visit (see consent form scanned into chart). Pt cleared of contraindications and verbal and written consent acquired. Pt given option of copy of consent form. Pt educated on benefits and potential side effects of DN.   FDN performed to B upper traps with pt in prone using pistoning technique. FDN performed to reduce pain and muscle tension, promote blood flow, and improve ROM and function x 12 minutes (no charge). Pt tolerated tx well without adverse effects. Pt was educated on what to expect following the procedure and she verbalized understanding. Dry needling performed by Vinicius Matute, PT, DPT.     Crystal received hot pack for 0 minutes to cervical spine.    Home Exercises Provided and Patient Education Provided.    Education provided:   - postural awareness   - use of cervical roll in pillow for added support while in bed.   -benefits/risks of dry needling  -expected muscle soreness following dry needling    Written Home Exercises Provided: Patient instructed to cont prior HEP.  Exercises were reviewed and Crystal was able to demonstrate them prior to the end of the session.  Crystal demonstrated good  understanding of the education provided.     See EMR under Patient Instructions for exercises provided prior visit.    Assessment     Patient presents to clinic with neck pain and headaches over the past few days. She reports she had good response to dry needling at last visit. Increased soreness immediately following with good relief of soft tissue restrictions and relief of headaches. Numerous localized twitch responses noted following dry needling session today in B upper  traps. Pt reports soreness by end of treatment session. Instructed her to drink plenty of water and continue with stretches despite increased soreness. Taught pt new stretches and self mobilizations of cervical spine for self management of improved soft tissue mobility following treatment session today. Will assess pt's response to dry needling at next visit to progress dry needling into cervical spine and sub occipitals.     Crystal is progressing well towards her goals.   Pt prognosis is Good.     Pt will continue to benefit from skilled outpatient physical therapy to address the deficits listed in the problem list box on initial evaluation, provide pt/family education and to maximize pt's level of independence in the home and community environment.     Pt's spiritual, cultural and educational needs considered and pt agreeable to plan of care and goals.     Anticipated barriers to physical therapy: none     GOALS: Short Term Goals: 4 weeks  1.Report decreased in pain at worse less than  <   / =  6  /10  to increase tolerance for functional activities  - ongoing  2. Pt to improve cervical rotation and extension range of motion by 10% to allow for improved functional mobility to allow for improvement in IADLs.   ongoing  3. Increased gross cervical MMT 1/2  grade to increase tolerance for ADL and work activities.   ongoing  4. Pt to report an improved ability to fall asleep at night with decreased frequency of symptoms. ongoing  5. Pt to tolerate HEP to improve ROM and independence with ADL's   ongoing     Long Term Goals: 8 weeks  1.Report decreased in pain at worst less than  <   / = 2 /10  to increase tolerance for functional activities    ongoing  2.Pt to improve B cervical rotation and extension active range of motion to WFL to allow for improved functional mobility to allow for improvement in IADLs.   ongoing  3.Increased gross cervical MMT  1 grade  to increase tolerance for ADL and work activities.    ongoing  4.  Pt will report at 36% level (20-40% impaired) on FOTO neck survey score for neck pain disability to demonstrate decrease in disability and improvement in neck pain.   ongoing  5. Pt to be Independent with HEP to improve ROM and independence with ADL's    ongoing  6. Increased MMT  for lower middle traps to > or = 4+/5 to increase tolerance for ADL and improve posture.   ongoing  7. Patient will improve L  strength with elbow bent position to reach 30# or better for improved ability to open jars and decrease frequency of dropping items.   Ongoing    8. Patient will be able to reach overhead with LUE for reaching into cabinets to demonstrate return to PLOF   ongoing  9. Patient will demonstrate lifting 10# with LUE without provoking symptoms greater than 2/10.    ongoing    Plan   Plan of Care Certification period: 4/3/19 - 7/15/19    Continue with current POC. Assess pt's response to dry needling.     Vinicius Matute, PT, DPT  05/10/2019

## 2019-05-10 ENCOUNTER — CLINICAL SUPPORT (OUTPATIENT)
Dept: REHABILITATION | Facility: HOSPITAL | Age: 39
End: 2019-05-10
Attending: INTERNAL MEDICINE
Payer: COMMERCIAL

## 2019-05-10 DIAGNOSIS — M54.2 NECK PAIN: ICD-10-CM

## 2019-05-10 PROCEDURE — 97140 MANUAL THERAPY 1/> REGIONS: CPT | Mod: PN

## 2019-05-10 PROCEDURE — 97110 THERAPEUTIC EXERCISES: CPT | Mod: PN

## 2019-05-15 ENCOUNTER — CLINICAL SUPPORT (OUTPATIENT)
Dept: REHABILITATION | Facility: HOSPITAL | Age: 39
End: 2019-05-15
Attending: INTERNAL MEDICINE
Payer: COMMERCIAL

## 2019-05-15 DIAGNOSIS — M54.2 NECK PAIN: ICD-10-CM

## 2019-05-15 DIAGNOSIS — M25.60 RANGE OF MOTION DEFICIT: ICD-10-CM

## 2019-05-15 PROCEDURE — 97140 MANUAL THERAPY 1/> REGIONS: CPT | Mod: PN

## 2019-05-15 PROCEDURE — 97110 THERAPEUTIC EXERCISES: CPT | Mod: PN

## 2019-05-15 NOTE — PROGRESS NOTES
"  Physical Therapy Daily Treatment Note     Name: Crystal Flores  Clinic Number: [id number]    Therapy Diagnosis:   Encounter Diagnoses   Name Primary?    Neck pain     Range of motion deficit      Physician: Isha Espinal MD    Visit Date: 5/15/2019    Physician Orders: PT Eval and Treat   Medical Diagnosis from Referral: M54.2 (ICD-10-CM) - Neck pain on left side  Evaluation Date: 4/3/2019  Authorization Period Expiration: 12/31/2019  Plan of Care Expiration: 7/15/19  Visit # / Visits authorized: 9 / 20    Time In: 0702  Time Out: 0800  Total Billable Time: 58 minutes    Precautions: Standard    Subjective     Pt reports: she felt good after last treatment. 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 Getting to sleep has not been easier. Performing HEP multiple times and before bed has helped.     She was compliant with home exercise program.  Response to previous treatment: good- soreness from needling, improvement in symptoms with increased cervical range  Functional change: none to report     Pain: 6/10  Location: Left neck, shoulder    Objective     Taken 5/15/19:  Cervical AROM:   L rotation- 60 deg   R rotation- 70 deg   Extension- 80 deg  Cervical MMT: grossly 4+/5 in all planes    BOLD= performed today    Crystal received therapeutic exercises to develop strength, endurance, ROM, flexibility and posture for 38 minutes including:    Warm-up: STANDING UBE x  3' fwd, 3' bwd  Upper trap stretch w overpressure 3 x 30" B  +Cervical flexion stretch w overpressure 3 x 30 sec  Cervical self SNAGs x10 B  Cervical extension c/ towel x10   +Supine suboccipital self-mob on half roll x 30 ea  +Isometric cervical ext and B SB w green TB x 20 ea way  +B sh horiz abd w green TB x 20  Levator scapula stretch: 2 x 30" B  L scalene stretch: 2 x 30"   Seated scapular retractions: 20 x 3" hold - d/c to HEP  Seated cervical retractions with R rotation: 2 x 10 x 5" hold  Cervical rotations L with facet blocking: " "10 x 3" hold - held off secondary to c/o increased Left neck pain  Median nerve glide: 1 x 20    Supine chin tuck: 2 x 10 x 5" hold  Supine shoulder flexion with white dowel: 2 x 10    Supine self scap mob series on 1/2 foam:   Pec stretch: 1 minute   Bear hugs: 20x   Serratus punchouts: 20x   Shoulder rolls: 20x   Alt UE flexion: 20x   Horizontal abduction: 20x - no resistance   Double shoulder ER: 20x - no resistance    Standing rows: RTB 2 x 20  Standing extension: RTB 2 x 20    Crystal received the following manual therapy techniques: Manual traction were applied to the: cervical spine for 20 minutes, including:  Cervical distraction   STM to L scalenes and upper trap  Suboccipital release     Patient provided written and verbal consent to receive functional dry needling at today's visit (see consent form scanned into chart). Pt cleared of contraindications and verbal and written consent acquired. Pt given option of copy of consent form. Pt educated on benefits and potential side effects of DN.   FDN performed to L suboccipitals and L cervical paraspinals in prone using pistoning technique. FDN performed to reduce pain and muscle tension, promote blood flow, and improve ROM and function x 10 minutes. Pt tolerated tx well without adverse effects. Pt was educated on what to expect following the procedure and she verbalized understanding. Dry needling performed by Vinicius Matute, PT, DPT.     Crystal received hot pack for 0 minutes to cervical spine.    Home Exercises Provided and Patient Education Provided.    Education provided:   - postural awareness   - use of cervical roll in pillow for added support while in bed.   -benefits/risks of dry needling  -expected muscle soreness following dry needling    Written Home Exercises Provided: Patient instructed to cont prior HEP. Addition of cervical flexion stretch, isometric cervical extension and sidebend with theraband, suboccipital self-mob, rows with theraband, B " shoulder extension with theraband, B shoulder horizontal abduction with theraband  Exercises were reviewed and Crystal was able to demonstrate them prior to the end of the session.  Crystal demonstrated good  understanding of the education provided.     See EMR under Patient Instructions for exercises provided prior visit.    Functional Limitation Report- G-CODE:  CMS Impairment/Limitation/Restriction for FOTO Neck Survey  Status Limitation G-Code CMS Severity Modifier  Intake 41% 59%  Predicted 64% 36% Goal Status+ CJ - At least 20 percent but less than 40 percent  4/24/2019 38% 62% Current Status CL - At least 60 percent but less than 80 percent  D/C Status CL **only report if this is discharge survey  +Based on FOTO predicted change score    Assessment     Crystal was re-assessed today with 3/5 STGs being met indicating improvements in cervical strength and AROM and tolerance for HEP since start of care. She continues with headaches and L sided neck pain, postural weakness, decreased cervical AROM, difficulty sleeping, and soft tissue dysfunction. Pt could benefit from continued physical therapy services to address deficits.     She had good tolerance to treatment today with no adverse effects. Post-treatment L sided of neck and shoulder pain rated as 0/10. Pt demonstrates L cervical rotation restrictions. Improvement in ROM and symptoms with manual therapy techniques. Good response to progression of exercise program. No adverse effects to dry needling. Reduction in headache by end of session. She was challenged with cervical strengthening due to UE weakness but improvement in symptoms with exercise.     Crystal is progressing well towards her goals.   Pt prognosis is Good.     Pt will continue to benefit from skilled outpatient physical therapy to address the deficits listed in the problem list box on initial evaluation, provide pt/family education and to maximize pt's level of independence in the home and community  environment.     Pt's spiritual, cultural and educational needs considered and pt agreeable to plan of care and goals.     Anticipated barriers to physical therapy: none     GOALS: Short Term Goals: 4 weeks  1.Report decreased in pain at worse less than  <   / =  6  /10  to increase tolerance for functional activities  - ongoing  2. Pt to improve cervical rotation and extension range of motion by 10% to allow for improved functional mobility to allow for improvement in IADLs. - met 5/15/19  3. Increased gross cervical MMT 1/2  grade to increase tolerance for ADL and work activities.- met 5/15/19  4. Pt to report an improved ability to fall asleep at night with decreased frequency of symptoms. ongoing  5. Pt to tolerate HEP to improve ROM and independence with ADL's. - met 5/15/19     Long Term Goals: 8 weeks  1.Report decreased in pain at worst less than  <   / = 2 /10  to increase tolerance for functional activities    ongoing  2.Pt to improve B cervical rotation and extension active range of motion to WFL to allow for improved functional mobility to allow for improvement in IADLs.   ongoing  3.Increased gross cervical MMT  1 grade  to increase tolerance for ADL and work activities.   ongoing  4.  Pt will report at 36% level (20-40% impaired) on FOTO neck survey score for neck pain disability to demonstrate decrease in disability and improvement in neck pain.   ongoing  5. Pt to be Independent with HEP to improve ROM and independence with ADL's    ongoing  6. Increased MMT  for lower middle traps to > or = 4+/5 to increase tolerance for ADL and improve posture.   ongoing  7. Patient will improve L  strength with elbow bent position to reach 30# or better for improved ability to open jars and decrease frequency of dropping items.   Ongoing    8. Patient will be able to reach overhead with LUE for reaching into cabinets to demonstrate return to PLOF   ongoing  9. Patient will demonstrate lifting 10# with LUE  without provoking symptoms greater than 2/10.    ongoing    Plan   Plan of Care Certification period: 4/3/19 - 7/15/19    Progress cervical and periscapular strengthening.     Cecilia Veronica, PT, DPT  05/15/2019

## 2019-05-23 NOTE — PROGRESS NOTES
"  Physical Therapy Daily Treatment Note     Name: Crystal Flores  Clinic Number: [id number]    Therapy Diagnosis:   Encounter Diagnoses   Name Primary?    Neck pain     Range of motion deficit      Physician: Isha Espinal MD    Visit Date: 5/24/2019    Physician Orders: PT Eval and Treat   Medical Diagnosis from Referral: M54.2 (ICD-10-CM) - Neck pain on left side  Evaluation Date: 4/3/2019  Authorization Period Expiration: 12/31/2019  Plan of Care Expiration: 7/15/19  Visit # / Visits authorized: 10 / 20    Time In: 0700  Time Out: 0759  Total Billable Time: 49 minutes    Precautions: Standard    Subjective     Pt reports: I've been able to move my head and neck with less pain. I've also been off of work. I woke up with a little more pain this morning than yesterday morning, but I also woke up on that side that hurts more.     She was compliant with home exercise program.  Response to previous treatment: good- soreness from needling, improvement in symptoms with increased cervical range  Functional change: none to report     Pain: 5/10  Location: Left neck, shoulder    Objective     Taken 5/15/19:  Cervical AROM:   L rotation- 60 deg   R rotation- 70 deg   Extension- 80 deg  Cervical MMT: grossly 4+/5 in all planes    BOLD= performed today    Crystal received therapeutic exercises to develop strength, endurance, ROM, flexibility and posture for 18 minutes including:    UBE x  3/3  +overhead lifting to 72" height c/ 2# 2x15  +scalene stretch c/ clavicle depression 4x30" B  +open books x10 c/ 5" holds B    Crystal received the following manual therapy techniques: Manual traction were applied to the: cervical spine for 20 minutes, including:  Cervical distraction   STM to L scalenes and upper trap  Suboccipital release     Patient provided written and verbal consent to receive functional dry needling at today's visit (see consent form scanned into chart). Pt cleared of contraindications and verbal and written consent " acquired. Pt given option of copy of consent form. Pt educated on benefits and potential side effects of DN.   FDN performed to L suboccipitals and L cervical paraspinals in prone using pistoning technique. FDN performed to reduce pain and muscle tension, promote blood flow, and improve ROM and function x 10 minutes. Pt tolerated tx well without adverse effects. Pt was educated on what to expect following the procedure and she verbalized understanding. Dry needling performed by Vinicius Matute, PT, DPT.     Crystal participated in dynamic functional therapeutic activities to improve functional performance for 20 minutes, including:  Lifting and tray management at work      Home Exercises Provided and Patient Education Provided.    Education provided:   - postural awareness   - use of cervical roll in pillow for added support while in bed.   -benefits/risks of dry needling  -expected muscle soreness following dry needling    Written Home Exercises Provided: Patient instructed to cont prior HEP. Addition of cervical flexion stretch, isometric cervical extension and sidebend with theraband, suboccipital self-mob, rows with theraband, B shoulder extension with theraband, B shoulder horizontal abduction with theraband  Exercises were reviewed and Crystal was able to demonstrate them prior to the end of the session.  Crystal demonstrated good  understanding of the education provided.     See EMR under Patient Instructions for exercises provided prior visit.    Functional Limitation Report- G-CODE:  CMS Impairment/Limitation/Restriction for FOTO Neck Survey  Status Limitation G-Code CMS Severity Modifier  Intake 41% 59%  Predicted 64% 36% Goal Status+ CJ - At least 20 percent but less than 40 percent  4/24/2019 38% 62% Current Status CL - At least 60 percent but less than 80 percent  D/C Status CL **only report if this is discharge survey  +Based on FOTO predicted change score    Assessment     Added working on lifting and tray  management to simulate her work environment. Instructed pt on proper body mechanics and to utilize elbow musculature over upper trap compensation. Multiple localized twitch responses noted with dry needling in B upper traps. Added scalene stretch secondary to anterior neck tightness. Added shelf taps to improve shoulder strength when pt has to reach for plates at work.     Crystal is progressing well towards her goals.   Pt prognosis is Good.     Pt will continue to benefit from skilled outpatient physical therapy to address the deficits listed in the problem list box on initial evaluation, provide pt/family education and to maximize pt's level of independence in the home and community environment.     Pt's spiritual, cultural and educational needs considered and pt agreeable to plan of care and goals.     Anticipated barriers to physical therapy: none     GOALS: Short Term Goals: 4 weeks  1.Report decreased in pain at worse less than  <   / =  6  /10  to increase tolerance for functional activities  - ongoing  2. Pt to improve cervical rotation and extension range of motion by 10% to allow for improved functional mobility to allow for improvement in IADLs. - met 5/15/19  3. Increased gross cervical MMT 1/2  grade to increase tolerance for ADL and work activities.- met 5/15/19  4. Pt to report an improved ability to fall asleep at night with decreased frequency of symptoms. ongoing  5. Pt to tolerate HEP to improve ROM and independence with ADL's. - met 5/15/19     Long Term Goals: 8 weeks  1.Report decreased in pain at worst less than  <   / = 2 /10  to increase tolerance for functional activities    ongoing  2.Pt to improve B cervical rotation and extension active range of motion to WFL to allow for improved functional mobility to allow for improvement in IADLs.   ongoing  3.Increased gross cervical MMT  1 grade  to increase tolerance for ADL and work activities.   ongoing  4.  Pt will report at 36% level (20-40%  impaired) on FOTO neck survey score for neck pain disability to demonstrate decrease in disability and improvement in neck pain.   ongoing  5. Pt to be Independent with HEP to improve ROM and independence with ADL's    ongoing  6. Increased MMT  for lower middle traps to > or = 4+/5 to increase tolerance for ADL and improve posture.   ongoing  7. Patient will improve L  strength with elbow bent position to reach 30# or better for improved ability to open jars and decrease frequency of dropping items.   Ongoing    8. Patient will be able to reach overhead with LUE for reaching into cabinets to demonstrate return to PLOF   ongoing  9. Patient will demonstrate lifting 10# with LUE without provoking symptoms greater than 2/10.    ongoing    Plan   Plan of Care Certification period: 4/3/19 - 7/15/19    Progress cervical and periscapular strengthening.     Vinicius Matute, PT, DPT  05/24/2019

## 2019-05-24 ENCOUNTER — CLINICAL SUPPORT (OUTPATIENT)
Dept: REHABILITATION | Facility: HOSPITAL | Age: 39
End: 2019-05-24
Attending: INTERNAL MEDICINE
Payer: COMMERCIAL

## 2019-05-24 DIAGNOSIS — M25.60 RANGE OF MOTION DEFICIT: ICD-10-CM

## 2019-05-24 DIAGNOSIS — M54.2 NECK PAIN: ICD-10-CM

## 2019-05-24 PROCEDURE — 97140 MANUAL THERAPY 1/> REGIONS: CPT | Mod: PN

## 2019-05-24 PROCEDURE — 97530 THERAPEUTIC ACTIVITIES: CPT | Mod: PN

## 2019-05-24 PROCEDURE — 97110 THERAPEUTIC EXERCISES: CPT | Mod: PN

## 2019-05-28 ENCOUNTER — CLINICAL SUPPORT (OUTPATIENT)
Dept: REHABILITATION | Facility: HOSPITAL | Age: 39
End: 2019-05-28
Attending: INTERNAL MEDICINE
Payer: COMMERCIAL

## 2019-05-28 DIAGNOSIS — M25.60 RANGE OF MOTION DEFICIT: ICD-10-CM

## 2019-05-28 DIAGNOSIS — M54.2 NECK PAIN: ICD-10-CM

## 2019-05-28 PROCEDURE — 97110 THERAPEUTIC EXERCISES: CPT | Mod: PN

## 2019-05-28 PROCEDURE — 97140 MANUAL THERAPY 1/> REGIONS: CPT | Mod: PN

## 2019-05-28 NOTE — PROGRESS NOTES
"  Physical Therapy Daily Treatment Note     Name: Crystal Flores  Clinic Number: [id number]    Therapy Diagnosis:   Encounter Diagnoses   Name Primary?    Neck pain     Range of motion deficit      Physician: Isha Espinal MD    Visit Date: 5/28/2019    Physician Orders: PT Eval and Treat   Medical Diagnosis from Referral: M54.2 (ICD-10-CM) - Neck pain on left side  Evaluation Date: 4/3/2019  Authorization Period Expiration: 12/31/2019  Plan of Care Expiration: 7/15/19  Visit # / Visits authorized: 11 / 20    Time In: 0756  Time Out: 0855  Total Billable Time: 40 minutes    Precautions: Standard    Subjective     Pt reports: Increased soreness Friday and Saturday after dry needling. Felt a lot better on Sunday. Wants to hold the needling today since she has to work a double and will be taking her daughter to orientation for college tomorrow. I worked a double yesterday without adverse affects on my neck.     She was compliant with home exercise program.  Response to previous treatment: good- soreness from needling, improvement in symptoms with increased cervical range  Functional change: none to report     Pain: 3/10  Location: Left neck, shoulder    Objective     Taken 5/15/19:  Cervical AROM:   L rotation- 60 deg   R rotation- 70 deg   Extension- 80 deg  Cervical MMT: grossly 4+/5 in all planes    BOLD= performed today    Crystal received therapeutic exercises to develop strength, endurance, ROM, flexibility and posture for 44 minutes including:    UBE x  3/3  overhead lifting to 72" height c/ 2# 2x15  open books x10 c/ 5" holds B  1/2 foam:   pec stretch 2x1'   Alt arms x20   Serratus punches x20  +scapular retractions GTB 2x15  +straight arm pulldowns GTB 2x15  +serratus wall slides c/ foam roller 2x15  +ER RTB 2x15 L    Crystal received the following manual therapy techniques: Manual traction were applied to the: cervical spine for 15 minutes, including:  Cervical distraction   STM to L scalenes and upper " trap  Suboccipital release     Patient provided written and verbal consent to receive functional dry needling at today's visit (see consent form scanned into chart). Pt cleared of contraindications and verbal and written consent acquired. Pt given option of copy of consent form. Pt educated on benefits and potential side effects of DN.   FDN performed to L suboccipitals and L cervical paraspinals in prone using pistoning technique. FDN performed to reduce pain and muscle tension, promote blood flow, and improve ROM and function x 00 minutes. Pt tolerated tx well without adverse effects. Pt was educated on what to expect following the procedure and she verbalized understanding. Dry needling performed by Vinicius Matute, PT, DPT.  (not performed today)    Crystal participated in dynamic functional therapeutic activities to improve functional performance for 0 minutes, including:  Lifting and tray management at work (NP)      Home Exercises Provided and Patient Education Provided.    Education provided:   - postural awareness   - use of cervical roll in pillow for added support while in bed.   -benefits/risks of dry needling  -expected muscle soreness following dry needling    Written Home Exercises Provided: Patient instructed to cont prior HEP. Addition of cervical flexion stretch, isometric cervical extension and sidebend with theraband, suboccipital self-mob, rows with theraband, B shoulder extension with theraband, B shoulder horizontal abduction with theraband  Exercises were reviewed and Crystal was able to demonstrate them prior to the end of the session.  Crystal demonstrated good  understanding of the education provided.     See EMR under Patient Instructions for exercises provided prior visit.    Assessment     Pt presents to clinic with decreased pain and discomfort in her neck and shoulder today. She demonstrates improved tolerance to work as she was able to work a double shift yesterday and has decreased pain  overall the following day. We held dry needling today, per pt's request since she is working a double shift today and taking her daughter to college orientation for the rest of the week. We focused more on manual therapy interventions to improve soft tissue of cervical musculature. She still has some tender areas in her upper traps but decreased soft tissue restrictions noted following manual therapy. We added back 1/2 foam exercises for posture. Also included various scapular strengthening interventions to improve scapulohumeral rhythm when lifting overhead and to reduce upper trap compensations that are likely leading to recurrence of her dysfunction and pain in her upper traps when at work.     Crystal is progressing well towards her goals.   Pt prognosis is Good.     Pt will continue to benefit from skilled outpatient physical therapy to address the deficits listed in the problem list box on initial evaluation, provide pt/family education and to maximize pt's level of independence in the home and community environment.     Pt's spiritual, cultural and educational needs considered and pt agreeable to plan of care and goals.     Anticipated barriers to physical therapy: none     GOALS: Short Term Goals: 4 weeks  1.Report decreased in pain at worse less than  <   / =  6  /10  to increase tolerance for functional activities  - ongoing  2. Pt to improve cervical rotation and extension range of motion by 10% to allow for improved functional mobility to allow for improvement in IADLs. - met 5/15/19  3. Increased gross cervical MMT 1/2  grade to increase tolerance for ADL and work activities.- met 5/15/19  4. Pt to report an improved ability to fall asleep at night with decreased frequency of symptoms. ongoing  5. Pt to tolerate HEP to improve ROM and independence with ADL's. - met 5/15/19     Long Term Goals: 8 weeks  1.Report decreased in pain at worst less than  <   / = 2 /10  to increase tolerance for functional  activities    ongoing  2.Pt to improve B cervical rotation and extension active range of motion to WFL to allow for improved functional mobility to allow for improvement in IADLs.   ongoing  3.Increased gross cervical MMT  1 grade  to increase tolerance for ADL and work activities.   ongoing  4.  Pt will report at 36% level (20-40% impaired) on FOTO neck survey score for neck pain disability to demonstrate decrease in disability and improvement in neck pain.   ongoing  5. Pt to be Independent with HEP to improve ROM and independence with ADL's    ongoing  6. Increased MMT  for lower middle traps to > or = 4+/5 to increase tolerance for ADL and improve posture.   ongoing  7. Patient will improve L  strength with elbow bent position to reach 30# or better for improved ability to open jars and decrease frequency of dropping items.   Ongoing    8. Patient will be able to reach overhead with LUE for reaching into cabinets to demonstrate return to PLOF   ongoing  9. Patient will demonstrate lifting 10# with LUE without provoking symptoms greater than 2/10.    ongoing    Plan   Plan of Care Certification period: 4/3/19 - 7/15/19    Progress cervical and periscapular strengthening.     Vinicius Matute, PT, DPT  05/28/2019

## 2019-06-13 NOTE — PROGRESS NOTES
"  Physical Therapy Daily Treatment Note     Name: Crystal Flores  Clinic Number: [id number]    Therapy Diagnosis:   Encounter Diagnoses   Name Primary?    Neck pain     Range of motion deficit      Physician: Isha Espinal MD    Visit Date: 6/14/2019     Physician Orders: PT Eval and Treat   Medical Diagnosis from Referral: M54.2 (ICD-10-CM) - Neck pain on left side  Evaluation Date: 4/3/2019  Authorization Period Expiration: 12/31/2019  Plan of Care Expiration: 7/15/19  Visit # / Visits authorized: 12 / 20    Time In: 0704  Time Out: 0805  Total Billable Time: 61 minutes    Precautions: Standard    Subjective     Pt reports: Reports pain in neck has improved a lot. She is also lifting her arm up better. She was in some pain Sunday after setting up a party for her daughter all day. She rested Sunday and felt a lot better on Monday.     She was compliant with home exercise program.  Response to previous treatment: good- soreness from needling, improvement in symptoms with increased cervical range  Functional change: none to report     Pain: 0/10  Location: Left neck, shoulder    Objective     Taken 6/14/19:  Cervical AROM:   L rotation- 75 deg   R rotation- 80 deg   Extension- 70 deg  Cervical MMT: grossly 5/5 in all planes    : R - 44#; L - 35#    BOLD= performed today    Crystal received therapeutic exercises to develop strength, endurance, ROM, flexibility and posture for 40 minutes including:    UBE x  3/3  overhead lifting to 72" height c/ 2# 2x15  open books x10 c/ 5" holds B (NP)  1/2 foam: (NP)   pec stretch 2x1'   Alt arms x20   Serratus punches x20  +scapular retractions BlueTB 2x15  +straight arm pulldowns BlueTB 2x15  +serratus wall slides c/ foam roller 2x15  +ER RTB 2x15 L    Crystal received the following manual therapy techniques: Manual traction were applied to the: cervical spine for 16 minutes, including:  Cervical distraction   STM to L scalenes and upper trap  Suboccipital release     Crystal " participated in dynamic functional therapeutic activities to improve functional performance for 0 minutes, including:  Lifting and tray management at work (NP)        Home Exercises Provided and Patient Education Provided.    Education provided:   - postural awareness   - use of cervical roll in pillow for added support while in bed.   -benefits/risks of dry needling  -expected muscle soreness following dry needling    Written Home Exercises Provided: Patient instructed to cont prior HEP. Addition of cervical flexion stretch, isometric cervical extension and sidebend with theraband, suboccipital self-mob, rows with theraband, B shoulder extension with theraband, B shoulder horizontal abduction with theraband  Exercises were reviewed and Crystal was able to demonstrate them prior to the end of the session.  Crystal demonstrated good  understanding of the education provided.     See EMR under Patient Instructions for exercises provided prior visit.    Assessment     Pt presents to clinic absent of neck pain today. She reports pain with neck flexion and extension at times. She has significantly improved with cervical rotation and all cervical strength. She has improved her ability to lift overhead with less difficulty and pain. She also reports improved function as see on her FOTO score. She improved to 42% limitation which is a 20% improvement from her previous FOTO score. She still has some limitations and pain that can be improved with skilled physical therapy services so she can tolerate increased BUE activity and work shifts.       Crystal is progressing well towards her goals.   Pt prognosis is Good.     Pt will continue to benefit from skilled outpatient physical therapy to address the deficits listed in the problem list box on initial evaluation, provide pt/family education and to maximize pt's level of independence in the home and community environment.     Pt's spiritual, cultural and educational needs  considered and pt agreeable to plan of care and goals.     Anticipated barriers to physical therapy: none     GOALS: Short Term Goals: 4 weeks  1.Report decreased in pain at worse less than  <   / =  6  /10  to increase tolerance for functional activities  - met 6/14/19  2. Pt to improve cervical rotation and extension range of motion by 10% to allow for improved functional mobility to allow for improvement in IADLs. - met 5/15/19  3. Increased gross cervical MMT 1/2  grade to increase tolerance for ADL and work activities.- met 5/15/19  4. Pt to report an improved ability to fall asleep at night with decreased frequency of symptoms. Met 6/14/19  5. Pt to tolerate HEP to improve ROM and independence with ADL's. - met 5/15/19     Long Term Goals: 8 weeks  1.Report decreased in pain at worst less than  <   / = 2 /10  to increase tolerance for functional activities    ongoing  2.Pt to improve B cervical rotation and extension active range of motion to WFL to allow for improved functional mobility to allow for improvement in IADLs.  ongoing  3.Increased gross cervical MMT  1 grade  to increase tolerance for ADL and work activities.   Met 6/14/19  4.  Pt will report at 36% level (20-40% impaired) on FOTO neck survey score for neck pain disability to demonstrate decrease in disability and improvement in neck pain.   ongoing  5. Pt to be Independent with HEP to improve ROM and independence with ADL's    Met 6/14/19  6. Increased MMT  for lower middle traps to > or = 4+/5 to increase tolerance for ADL and improve posture.   ongoing  7. Patient will improve L  strength with elbow bent position to reach 30# or better for improved ability to open jars and decrease frequency of dropping items.   Met 6/14/19  8. Patient will be able to reach overhead with LUE for reaching into cabinets to demonstrate return to PLOF   Met 6/14/19  9. Patient will demonstrate lifting 10# with LUE without provoking symptoms greater than 2/10.     ongoing    Plan   Plan of Care Certification period: 4/3/19 - 7/15/19    Progress cervical and periscapular strengthening.     Vinicius Matute, PT, DPT  06/14/2019

## 2019-06-14 ENCOUNTER — CLINICAL SUPPORT (OUTPATIENT)
Dept: REHABILITATION | Facility: HOSPITAL | Age: 39
End: 2019-06-14
Attending: INTERNAL MEDICINE
Payer: COMMERCIAL

## 2019-06-14 DIAGNOSIS — M54.2 NECK PAIN: ICD-10-CM

## 2019-06-14 DIAGNOSIS — M25.60 RANGE OF MOTION DEFICIT: ICD-10-CM

## 2019-06-14 PROCEDURE — 97140 MANUAL THERAPY 1/> REGIONS: CPT | Mod: PN

## 2019-06-14 PROCEDURE — 97110 THERAPEUTIC EXERCISES: CPT | Mod: PN

## 2019-06-26 ENCOUNTER — CLINICAL SUPPORT (OUTPATIENT)
Dept: REHABILITATION | Facility: HOSPITAL | Age: 39
End: 2019-06-26
Attending: INTERNAL MEDICINE
Payer: COMMERCIAL

## 2019-06-26 DIAGNOSIS — M54.2 NECK PAIN: ICD-10-CM

## 2019-06-26 DIAGNOSIS — M25.60 RANGE OF MOTION DEFICIT: ICD-10-CM

## 2019-06-26 PROCEDURE — 97110 THERAPEUTIC EXERCISES: CPT | Mod: PN

## 2019-06-26 PROCEDURE — 97140 MANUAL THERAPY 1/> REGIONS: CPT | Mod: PN

## 2019-06-26 NOTE — PROGRESS NOTES
"  Physical Therapy Daily Treatment Note     Name: Crystal Flores  Clinic Number: [id number]    Therapy Diagnosis:   Encounter Diagnoses   Name Primary?    Neck pain     Range of motion deficit      Physician: Isha Espinal MD    Visit Date: 6/26/2019     Physician Orders: PT Eval and Treat   Medical Diagnosis from Referral: M54.2 (ICD-10-CM) - Neck pain on left side  Evaluation Date: 4/3/2019  Authorization Period Expiration: 12/31/2019  Plan of Care Expiration: 7/15/19  Visit # / Visits authorized: 13 / 20    Time In: 0700  Time Out: 0757  Total Billable Time: 57 minutes    Precautions: Standard    Subjective     Pt reports: having no pain and feeling better and feels like rest has allowed for her improvement. Pt continues to have trouble looking up due to pain. She is sleeping better with use of neck pillow. Pt has work at 10 AM today. Lifts about 20-30 pounds at work.     She was compliant with home exercise program.  Response to previous treatment: good- soreness with some irritation  Functional change: improved ability to lift and use of L arm    Pain: 0/10  Location: Left neck, shoulder    Objective     Taken 6/14/19:  Cervical AROM:   L rotation- 75 deg   R rotation- 80 deg   Extension- 70 deg  Cervical MMT: grossly 5/5 in all planes  : R - 44#; L - 35#    BOLD= performed today    Crystal received therapeutic exercises to develop strength, endurance, ROM, flexibility and posture for 37 minutes including:    UBE x 3/3  +Scalenes stretch 3 x 30 sec  +Wall push ups x 30  +B sh horiz abd w green TB x 20  +D2 flex w green TB x 20  +Self-mob on foam roller to promote L cerv PA glides x 2 min  overhead lifting to 72" height c/ 2# 2x15  open books x10 c/ 5" holds B (NP)  1/2 foam: (NP)   pec stretch 2x1'   Alt arms x20   Serratus punches x20  Scapular retractions BlueTB 2x15  Straight arm pulldowns BlueTB 2x15  Serratus wall slides c/ foam roller 2x15  ER RTB 2x15 L    Crystal received the following manual therapy " techniques: Manual traction were applied to the: cervical spine for 20 minutes, including:  Cervical distraction   STM to L scalenes and upper trap  Suboccipital release   Grade III/IV L upper cervical PA joint mobilizations  1st rib joint mob    Crystal participated in dynamic functional therapeutic activities to improve functional performance for 0 minutes, including:  Lifting and tray management at work (NP)    Home Exercises Provided and Patient Education Provided.    Education provided:   - postural awareness   - use of cervical roll in pillow for added support while in bed.   -benefits/risks of dry needling  -expected muscle soreness following dry needling  -Methods of cervical and 1st rib mob  - Cervical spine and scalene anatomy    Written Home Exercises Provided: Patient instructed to cont prior HEP. Cervical flexion stretch, isometric cervical extension and sidebend with theraband, suboccipital self-mob, rows with theraband, B shoulder extension with theraband, B shoulder horizontal abduction with theraband  Exercises were reviewed and Crystal was able to demonstrate them prior to the end of the session.  Crystal demonstrated good  understanding of the education provided.     See EMR under Patient Instructions for exercises provided prior visit.    Assessment     Pt had good tolerance to treatment today with no adverse effects. Post-treatment neck pain rated as 0/10. Pt demonstrates pain with end-range cervical rotation to L. Pt reports pinch and pain down scalenes. Improvements with manual therapy techniques. Diminished radiating pain with 1st rib mob. Good demonstration of L sided cervical self-mob on foam roller. Responded well to progression of UE strengthening.       Crystal is progressing well towards her goals.   Pt prognosis is Good.     Pt will continue to benefit from skilled outpatient physical therapy to address the deficits listed in the problem list box on initial evaluation, provide pt/family  education and to maximize pt's level of independence in the home and community environment.     Pt's spiritual, cultural and educational needs considered and pt agreeable to plan of care and goals.     Anticipated barriers to physical therapy: none     GOALS: Short Term Goals: 4 weeks  1.Report decreased in pain at worse less than  <   / =  6  /10  to increase tolerance for functional activities  - met 6/14/19  2. Pt to improve cervical rotation and extension range of motion by 10% to allow for improved functional mobility to allow for improvement in IADLs. - met 5/15/19  3. Increased gross cervical MMT 1/2  grade to increase tolerance for ADL and work activities.- met 5/15/19  4. Pt to report an improved ability to fall asleep at night with decreased frequency of symptoms. Met 6/14/19  5. Pt to tolerate HEP to improve ROM and independence with ADL's. - met 5/15/19     Long Term Goals: 8 weeks  1.Report decreased in pain at worst less than  <   / = 2 /10  to increase tolerance for functional activities    ongoing  2.Pt to improve B cervical rotation and extension active range of motion to WFL to allow for improved functional mobility to allow for improvement in IADLs.  ongoing  3.Increased gross cervical MMT  1 grade  to increase tolerance for ADL and work activities.   Met 6/14/19  4.  Pt will report at 36% level (20-40% impaired) on FOTO neck survey score for neck pain disability to demonstrate decrease in disability and improvement in neck pain.   ongoing  5. Pt to be Independent with HEP to improve ROM and independence with ADL's    Met 6/14/19  6. Increased MMT  for lower middle traps to > or = 4+/5 to increase tolerance for ADL and improve posture.   ongoing  7. Patient will improve L  strength with elbow bent position to reach 30# or better for improved ability to open jars and decrease frequency of dropping items.   Met 6/14/19  8. Patient will be able to reach overhead with LUE for reaching into  cabinets to demonstrate return to PLOF   Met 6/14/19  9. Patient will demonstrate lifting 10# with LUE without provoking symptoms greater than 2/10.    ongoing    Plan   Plan of Care Certification period: 4/3/19 - 7/15/19    Progress UE strengthening as tolerated following decreased cervical pain.    Hussein Fraah, SPT  Cecilia Veronica, PT, DPT  I certify that I was present in the room directing the student in service delivery and guiding them using my skilled judgment. As the co-signing therapist I have reviewed the students documentation and am responsible for the treatment, assessment, and plan.      06/26/2019

## 2019-07-05 ENCOUNTER — CLINICAL SUPPORT (OUTPATIENT)
Dept: REHABILITATION | Facility: HOSPITAL | Age: 39
End: 2019-07-05
Attending: INTERNAL MEDICINE
Payer: COMMERCIAL

## 2019-07-05 DIAGNOSIS — M54.2 NECK PAIN: ICD-10-CM

## 2019-07-05 DIAGNOSIS — M25.60 RANGE OF MOTION DEFICIT: ICD-10-CM

## 2019-07-05 PROCEDURE — 97140 MANUAL THERAPY 1/> REGIONS: CPT | Mod: PN

## 2019-07-05 PROCEDURE — 97110 THERAPEUTIC EXERCISES: CPT | Mod: PN

## 2019-07-05 NOTE — PROGRESS NOTES
"  Physical Therapy Daily Treatment Note     Name: rCystal Flores  Clinic Number: [id number]    Therapy Diagnosis:   Encounter Diagnoses   Name Primary?    Neck pain     Range of motion deficit      Physician: Isha Espinal MD    Visit Date: 7/5/2019     Physician Orders: PT Eval and Treat   Medical Diagnosis from Referral: M54.2 (ICD-10-CM) - Neck pain on left side  Evaluation Date: 4/3/2019  Authorization Period Expiration: 12/31/2019  Plan of Care Expiration: 7/15/19  Visit # / Visits authorized: 14 / 20    Time In: 1602  Time Out: 1645  Total Billable Time: 43 minutes    Precautions: Standard    Subjective     Pt reports: had a flare up of pain yesterday. Still hurting today. Haven't been sleeping well the past few nights. It feels like my neck gets stuck when I look to the left.     She was compliant with home exercise program.  Response to previous treatment: good- soreness with some irritation  Functional change: improved ability to lift and use of L arm    Pain: 4/10  Location: Left neck, shoulder    Objective     BOLD= performed today    Crystal received therapeutic exercises to develop strength, endurance, ROM, flexibility and posture for 16 minutes including:    UBE x 3/3  +supine thoracic ext/pec stretch over pillow 2x1'  +standing horiz abd YTB x10  +standing shoulder ext YTB x10  +supine scaption YTB x10 B  +cervical self SNAGS c/ towel x15 (L rot)  open books x10 c/ 5" holds B     Crystal received the following manual therapy techniques: Manual traction were applied to the: cervical spine for 27 minutes, including:  Cervical distraction   STM to L scalenes and upper trap  Suboccipital release   Grade III/IV L upper cervical PA joint mobilizations  +Grade III/IV cervical lateral glides  1st rib joint mob (NP)    Crystal participated in dynamic functional therapeutic activities to improve functional performance for 0 minutes, including:  Lifting and tray management at work (NP)    Home Exercises Provided " "and Patient Education Provided.    Education provided:   - postural awareness   - use of cervical roll in pillow for added support while in bed.   -performing cervical self SNAGs and using tennis ball for self sub occipital release at home    Written Home Exercises Provided: Patient instructed to cont prior HEP. Cervical flexion stretch, isometric cervical extension and sidebend with theraband, suboccipital self-mob, rows with theraband, B shoulder extension with theraband, B shoulder horizontal abduction with theraband  Exercises were reviewed and Crystal was able to demonstrate them prior to the end of the session.  Crystal demonstrated good  understanding of the education provided.     See EMR under Patient Instructions for exercises provided prior visit.    Assessment     Pt presents to clinic following a flare up of neck pain yesterday. Her pain is ipsilateral, and she feels the neck gets "stuck" when turning to the L. Improved joint mobility noted following lateral glides of cervical spine. Also performed cervical manual distraction, sub occipital release, and soft tissue work to reduce soft tissue restrictions. Added scapular strengthening and supine pec stretch to improve thoracic extension and posture.       Crystal is progressing well towards her goals.   Pt prognosis is Good.     Pt will continue to benefit from skilled outpatient physical therapy to address the deficits listed in the problem list box on initial evaluation, provide pt/family education and to maximize pt's level of independence in the home and community environment.     Pt's spiritual, cultural and educational needs considered and pt agreeable to plan of care and goals.     Anticipated barriers to physical therapy: none     GOALS: Short Term Goals: 4 weeks  1.Report decreased in pain at worse less than  <   / =  6  /10  to increase tolerance for functional activities  - met 6/14/19  2. Pt to improve cervical rotation and extension range of " motion by 10% to allow for improved functional mobility to allow for improvement in IADLs. - met 5/15/19  3. Increased gross cervical MMT 1/2  grade to increase tolerance for ADL and work activities.- met 5/15/19  4. Pt to report an improved ability to fall asleep at night with decreased frequency of symptoms. Met 6/14/19  5. Pt to tolerate HEP to improve ROM and independence with ADL's. - met 5/15/19     Long Term Goals: 8 weeks  1.Report decreased in pain at worst less than  <   / = 2 /10  to increase tolerance for functional activities    ongoing  2.Pt to improve B cervical rotation and extension active range of motion to WFL to allow for improved functional mobility to allow for improvement in IADLs.  ongoing  3.Increased gross cervical MMT  1 grade  to increase tolerance for ADL and work activities.   Met 6/14/19  4.  Pt will report at 36% level (20-40% impaired) on FOTO neck survey score for neck pain disability to demonstrate decrease in disability and improvement in neck pain.   ongoing  5. Pt to be Independent with HEP to improve ROM and independence with ADL's    Met 6/14/19  6. Increased MMT  for lower middle traps to > or = 4+/5 to increase tolerance for ADL and improve posture.   ongoing  7. Patient will improve L  strength with elbow bent position to reach 30# or better for improved ability to open jars and decrease frequency of dropping items.   Met 6/14/19  8. Patient will be able to reach overhead with LUE for reaching into cabinets to demonstrate return to PLOF   Met 6/14/19  9. Patient will demonstrate lifting 10# with LUE without provoking symptoms greater than 2/10.    ongoing    Plan   Plan of Care Certification period: 4/3/19 - 7/15/19    Progress UE strengthening as tolerated following decreased cervical pain.    Vinicius Matute, PT, DPT  07/05/2019

## 2019-07-24 ENCOUNTER — CLINICAL SUPPORT (OUTPATIENT)
Dept: REHABILITATION | Facility: HOSPITAL | Age: 39
End: 2019-07-24
Attending: INTERNAL MEDICINE
Payer: COMMERCIAL

## 2019-07-24 DIAGNOSIS — M25.60 RANGE OF MOTION DEFICIT: ICD-10-CM

## 2019-07-24 DIAGNOSIS — M54.2 NECK PAIN: ICD-10-CM

## 2019-07-24 PROCEDURE — 97140 MANUAL THERAPY 1/> REGIONS: CPT | Mod: PN

## 2019-07-24 PROCEDURE — 97110 THERAPEUTIC EXERCISES: CPT | Mod: PN

## 2019-07-24 NOTE — PROGRESS NOTES
Physical Therapy Daily Treatment Note     Name: Crystal Flores  Clinic Number: [id number]    Therapy Diagnosis:   Encounter Diagnoses   Name Primary?    Neck pain     Range of motion deficit      Physician: Isha Espinal MD    Visit Date: 7/24/2019     Physician Orders: PT Eval and Treat   Medical Diagnosis from Referral: M54.2 (ICD-10-CM) - Neck pain on left side  Evaluation Date: 4/3/2019  Authorization Period Expiration: 12/31/2019  Plan of Care Expiration: 7/15/19  Visit # / Visits authorized: 15 / 20    Time In: 0700  Time Out: 0755  Total Billable Time: 55 minutes    Precautions: Standard    Subjective     Pt reports: she feels good and has no neck pain. She had a couple flare ups of neck pain recently but was able to self manage her symptoms with exercise.    She was compliant with home exercise program.  Response to previous treatment: good- soreness with some irritation  Functional change: improved ability to lift and use of L arm    Pain: 0/10  Location: Left neck, shoulder    Objective     BOLD= performed today    Low trap MMT: 4/5 bilaterally (7/24/19)    Crystal received therapeutic exercises to develop strength, endurance, ROM, flexibility and posture for 40 minutes including:    UBE x 3/3  supine thoracic ext/pec stretch over pillow 3 min  Supine self mob on half foam roll x20 ea  standing horiz abd YTB x10  standing shoulder ext YTB x10  Rows w/ Green TB x 20  supine scaption YTB x10 B  +Bruggers w/ green TB x20  +Matrix curls 10# x 20  +Matrix extensions 40# x 20  +Matrix rows 25# x 20  cervical self SNAGS c/ towel x15 (L rot)    Crystal received the following manual therapy techniques: Manual traction were applied to the: cervical spine for 15 minutes, including:    Cervical distraction   STM to L scalenes and upper trap  Suboccipital release   Grade III/IV L upper cervical PA joint mobilizations  +Grade III/IV cervical lateral glides  1st rib joint mob (NP)    Crystal participated in dynamic  functional therapeutic activities to improve functional performance for 0 minutes, including:  Lifting and tray management at work (NP)    CMS Impairment/Limitation/Restriction for FOTO neck Survey    Therapist reviewed FOTO scores for Crystal Flores on 7/24/2019.   FOTO documents entered into [employer] - see Media section.    Limitation Score: 33%  Category: Mobility    Current : CJ = at least 20% but < 40% impaired, limited or restricted  Goal: CJ = at least 20% but < 40% impaired, limited or restricted  Discharge: CJ = at least 20% but < 40% impaired, limited or restricted       Home Exercises Provided and Patient Education Provided.    Education provided:   - postural awareness   - use of cervical roll in pillow for added support while in bed.   -performing cervical self SNAGs and using tennis ball for self sub occipital release at home    Written Home Exercises Provided: Patient instructed to cont prior HEP. Cervical flexion stretch, isometric cervical extension and sidebend with theraband, suboccipital self-mob, rows with theraband, B shoulder extension with theraband, B shoulder horizontal abduction with theraband  Exercises were reviewed and Crystal was able to demonstrate them prior to the end of the session.  Crystal demonstrated good  understanding of the education provided.     See EMR under Patient Instructions for exercises provided prior visit.    Assessment     Pt tolerated therapy well with no adverse effects. She had noted stiffness during manual therapy which decreased with STM. Pt demonstrated ability to self-manage symptoms with exercises and stretches. During session, pt was taught UE strengthening exercises due to concern for returning to the gym and performing exercises. She demonstrated good performance and understanding of UE strengthening and neck exercises. Pt was discharged from therapy meeting 5/5 STGs and 7/9 LTGS indicating decrease in neck pain, cervical AROM, and UE strength since start of  care. Pt will continue HEP exercises for self-management of symptoms.     Anticipated barriers to physical therapy: none     GOALS: Short Term Goals: 4 weeks  1.Report decreased in pain at worse less than  <   / =  6  /10  to increase tolerance for functional activities  - met 6/14/19  2. Pt to improve cervical rotation and extension range of motion by 10% to allow for improved functional mobility to allow for improvement in IADLs. - met 5/15/19  3. Increased gross cervical MMT 1/2  grade to increase tolerance for ADL and work activities.- met 5/15/19  4. Pt to report an improved ability to fall asleep at night with decreased frequency of symptoms. Met 6/14/19  5. Pt to tolerate HEP to improve ROM and independence with ADL's. - met 5/15/19     Long Term Goals: 8 weeks  1.Report decreased in pain at worst less than  <   / = 2 /10  to increase tolerance for functional activities    Not MET 7/24/19  2.Pt to improve B cervical rotation and extension active range of motion to WFL to allow for improved functional mobility to allow for improvement in IADLs.  Met 7/24/19  3.Increased gross cervical MMT  1 grade  to increase tolerance for ADL and work activities.   Met 6/14/19  4.  Pt will report at 36% level (20-40% impaired) on FOTO neck survey score for neck pain disability to demonstrate decrease in disability and improvement in neck pain.  MET 7/24/19  5. Pt to be Independent with HEP to improve ROM and independence with ADL's    Met 6/14/19  6. Increased MMT  for lower middle traps to > or = 4+/5 to increase tolerance for ADL and improve posture.  NOT met 7/24/19  7. Patient will improve L  strength with elbow bent position to reach 30# or better for improved ability to open jars and decrease frequency of dropping items.   Met 6/14/19  8. Patient will be able to reach overhead with LUE for reaching into cabinets to demonstrate return to PLOF   Met 6/14/19  9. Patient will demonstrate lifting 10# with LUE without  provoking symptoms greater than 2/10.  MET 7/24/19    Plan   Plan of Care Certification period: 4/3/19 - 7/15/19    Patient discharged from therapy    Hussein Farah, TWYLA Veronica, PT, DPT  I certify that I was present in the room directing the student in service delivery and guiding them using my skilled judgment. As the co-signing therapist I have reviewed the students documentation and am responsible for the treatment, assessment, and plan.    07/24/2019

## 2019-08-19 ENCOUNTER — OFFICE VISIT (OUTPATIENT)
Dept: FAMILY MEDICINE | Facility: CLINIC | Age: 39
End: 2019-08-19
Payer: COMMERCIAL

## 2019-08-19 VITALS
BODY MASS INDEX: 32.26 KG/M2 | DIASTOLIC BLOOD PRESSURE: 70 MMHG | WEIGHT: 153.69 LBS | TEMPERATURE: 99 F | OXYGEN SATURATION: 98 % | SYSTOLIC BLOOD PRESSURE: 116 MMHG | HEART RATE: 73 BPM | HEIGHT: 58 IN

## 2019-08-19 DIAGNOSIS — J06.9 UPPER RESPIRATORY INFECTION, VIRAL: ICD-10-CM

## 2019-08-19 DIAGNOSIS — Z13.220 LIPID SCREENING: ICD-10-CM

## 2019-08-19 DIAGNOSIS — H60.502 ACUTE OTITIS EXTERNA OF LEFT EAR, UNSPECIFIED TYPE: Primary | ICD-10-CM

## 2019-08-19 PROCEDURE — 99999 PR PBB SHADOW E&M-EST. PATIENT-LVL III: CPT | Mod: PBBFAC,,, | Performed by: INTERNAL MEDICINE

## 2019-08-19 PROCEDURE — 99214 OFFICE O/P EST MOD 30 MIN: CPT | Mod: S$GLB,,, | Performed by: INTERNAL MEDICINE

## 2019-08-19 PROCEDURE — 3008F BODY MASS INDEX DOCD: CPT | Mod: CPTII,S$GLB,, | Performed by: INTERNAL MEDICINE

## 2019-08-19 PROCEDURE — 99214 PR OFFICE/OUTPT VISIT, EST, LEVL IV, 30-39 MIN: ICD-10-PCS | Mod: S$GLB,,, | Performed by: INTERNAL MEDICINE

## 2019-08-19 PROCEDURE — 99999 PR PBB SHADOW E&M-EST. PATIENT-LVL III: ICD-10-PCS | Mod: PBBFAC,,, | Performed by: INTERNAL MEDICINE

## 2019-08-19 PROCEDURE — 3008F PR BODY MASS INDEX (BMI) DOCUMENTED: ICD-10-PCS | Mod: CPTII,S$GLB,, | Performed by: INTERNAL MEDICINE

## 2019-08-19 RX ORDER — BENZONATATE 200 MG/1
200 CAPSULE ORAL 3 TIMES DAILY PRN
Qty: 30 CAPSULE | Refills: 0 | Status: SHIPPED | OUTPATIENT
Start: 2019-08-19 | End: 2019-08-29

## 2019-08-19 RX ORDER — OFLOXACIN 3 MG/ML
10 SOLUTION AURICULAR (OTIC) DAILY
Qty: 5 ML | Refills: 0 | Status: SHIPPED | OUTPATIENT
Start: 2019-08-19 | End: 2019-08-26

## 2019-08-19 NOTE — PROGRESS NOTES
SUBJECTIVE     Chief Complaint   Patient presents with    Sore Throat     Pt. states she has ear ache since last Thursday, bad coughing , eye lids swelling off and on and fever/chills over the weekend .. states no other issues        HPI  Crystal Flores is a 39 y.o. female with multiple medical diagnoses as listed in the medical history and problem list that presents for evaluation of sore throat since last Tuesday. Pt reports symptoms started with a sore throat then progressed to B/L otalgia(L>R) and cough with post-tussive emesis. +fever up to 101(last know Saturday at 1 or 2pm), chills, and night sweats. Pt has been taking Ibuprofen and Tessalon Perles with some improvement of symptoms.   +sick contacts as co-workers with strep throat. +recent travel to  via cruise last month.    PAST MEDICAL HISTORY:  History reviewed. No pertinent past medical history.    PAST SURGICAL HISTORY:  Past Surgical History:   Procedure Laterality Date     SECTION      HYSTERECTOMY      TUBAL LIGATION         SOCIAL HISTORY:  Social History     Socioeconomic History    Marital status: Single     Spouse name: Not on file    Number of children: Not on file    Years of education: Not on file    Highest education level: Not on file   Occupational History    Not on file   Social Needs    Financial resource strain: Not on file    Food insecurity:     Worry: Not on file     Inability: Not on file    Transportation needs:     Medical: Not on file     Non-medical: Not on file   Tobacco Use    Smoking status: Never Smoker   Substance and Sexual Activity    Alcohol use: Not on file    Drug use: Not on file    Sexual activity: Not on file   Lifestyle    Physical activity:     Days per week: Not on file     Minutes per session: Not on file    Stress: Not on file   Relationships    Social connections:     Talks on phone: Not on file     Gets together: Not on file     Attends Oriental orthodox service: Not on file     Active  "member of club or organization: Not on file     Attends meetings of clubs or organizations: Not on file     Relationship status: Not on file   Other Topics Concern    Not on file   Social History Narrative    Not on file       FAMILY HISTORY:  Family History   Problem Relation Age of Onset    Diabetes Maternal Grandmother     Stroke Maternal Grandmother        ALLERGIES AND MEDICATIONS: updated and reviewed.  Review of patient's allergies indicates:   Allergen Reactions    Codeine Hives     Current Outpatient Medications   Medication Sig Dispense Refill    benzonatate (TESSALON) 200 MG capsule Take 1 capsule (200 mg total) by mouth 3 (three) times daily as needed. 30 capsule 0    ofloxacin (FLOXIN) 0.3 % otic solution Place 10 drops into the left ear once daily. for 7 days 5 mL 0     No current facility-administered medications for this visit.        ROS  Review of Systems   Constitutional: Positive for chills and fever.   HENT: Positive for ear pain (B/L(L>R)) and sore throat. Negative for hearing loss.         B/L upper eyelid edema   Eyes: Negative for visual disturbance.   Respiratory: Positive for cough. Negative for shortness of breath.    Cardiovascular: Negative for chest pain, palpitations and leg swelling.   Gastrointestinal: Negative for abdominal pain, constipation, diarrhea, nausea and vomiting.   Genitourinary: Negative for dysuria, frequency and urgency.   Musculoskeletal: Negative for arthralgias, joint swelling and myalgias.   Skin: Negative for rash and wound.   Neurological: Negative for headaches.   Psychiatric/Behavioral: Negative for agitation and confusion. The patient is not nervous/anxious.          OBJECTIVE     Physical Exam  Vitals:    08/19/19 0944   BP: 116/70   Pulse: 73   Temp: 98.8 °F (37.1 °C)    Body mass index is 32.12 kg/m².  Weight: 69.7 kg (153 lb 10.6 oz)   Height: 4' 10" (147.3 cm)     Physical Exam   Constitutional: She is oriented to person, place, and time. She " appears well-developed and well-nourished. No distress.   HENT:   Head: Normocephalic and atraumatic.   Right Ear: Hearing, tympanic membrane and external ear normal.   Left Ear: Hearing and external ear normal. There is tenderness. No drainage. Tympanic membrane is erythematous. No decreased hearing is noted.   Nose: Nose normal. No rhinorrhea. Right sinus exhibits no maxillary sinus tenderness and no frontal sinus tenderness. Left sinus exhibits no maxillary sinus tenderness and no frontal sinus tenderness.   Mouth/Throat: Oropharynx is clear and moist. No uvula swelling. No posterior oropharyngeal edema or posterior oropharyngeal erythema.   Eyes: Conjunctivae and EOM are normal. Right eye exhibits no discharge. Left eye exhibits no discharge. No scleral icterus.   Neck: Normal range of motion. Neck supple. No JVD present. No tracheal deviation present.   Cardiovascular: Normal rate, regular rhythm and intact distal pulses. Exam reveals no gallop and no friction rub.   No murmur heard.  Pulmonary/Chest: Effort normal and breath sounds normal. No respiratory distress. She has no wheezes.   Abdominal: Soft. Bowel sounds are normal. She exhibits no distension and no mass. There is no tenderness. There is no rebound and no guarding.   Musculoskeletal: Normal range of motion. She exhibits no edema, tenderness or deformity.   Neurological: She is alert and oriented to person, place, and time. She exhibits normal muscle tone. Coordination normal.   Skin: Skin is warm and dry. No rash noted. No erythema.   Psychiatric: She has a normal mood and affect. Her behavior is normal. Judgment and thought content normal.         Health Maintenance       Date Due Completion Date    Lipid Panel 1980 ---    TETANUS VACCINE 02/04/1998 ---    Influenza Vaccine (1) 08/01/2019 ---    Pap Smear with HPV Cotest 07/05/2024 7/5/2019            ASSESSMENT     39 y.o. female with     1. Acute otitis externa of left ear, unspecified type     2. Upper respiratory infection, viral    3. Lipid screening        PLAN:     1. Acute otitis externa of left ear, unspecified type  - Pt advised to take Abx to completion  - ofloxacin (FLOXIN) 0.3 % otic solution; Place 10 drops into the left ear once daily. for 7 days  Dispense: 5 mL; Refill: 0    2. Upper respiratory infection, viral  - Continue symptomatic treatment with rest, increase fluid intake, tylenol or ibuprofen PRN fever(temp >/= 100.4) or body aches. Okay to take OTC antihistamines, i.e. Bendaryl, Claritin, Allegra, etc. as needed.  - Okay to gargle with warm, salt water or use throat lozenges as needed  - benzonatate (TESSALON) 200 MG capsule; Take 1 capsule (200 mg total) by mouth 3 (three) times daily as needed.  Dispense: 30 capsule; Refill: 0  - Comprehensive metabolic panel; Future  - CBC auto differential; Future  - TSH; Future    3.  Lipid screening  - Lipid panel; Future        RTC in 1-2 weeks as needed for any acute worsening of current condition or failure to improve       Isha Espinal MD  08/19/2019 9:50 AM        No follow-ups on file.

## 2019-09-12 ENCOUNTER — LAB VISIT (OUTPATIENT)
Dept: LAB | Facility: HOSPITAL | Age: 39
End: 2019-09-12
Attending: INTERNAL MEDICINE
Payer: COMMERCIAL

## 2019-09-12 DIAGNOSIS — J06.9 UPPER RESPIRATORY INFECTION, VIRAL: ICD-10-CM

## 2019-09-12 DIAGNOSIS — Z13.220 LIPID SCREENING: ICD-10-CM

## 2019-09-12 LAB
ALBUMIN SERPL BCP-MCNC: 4 G/DL (ref 3.5–5.2)
ALP SERPL-CCNC: 43 U/L (ref 55–135)
ALT SERPL W/O P-5'-P-CCNC: 16 U/L (ref 10–44)
ANION GAP SERPL CALC-SCNC: 5 MMOL/L (ref 8–16)
AST SERPL-CCNC: 15 U/L (ref 10–40)
BASOPHILS # BLD AUTO: 0.02 K/UL (ref 0–0.2)
BASOPHILS NFR BLD: 0.3 % (ref 0–1.9)
BILIRUB SERPL-MCNC: 0.3 MG/DL (ref 0.1–1)
BUN SERPL-MCNC: 14 MG/DL (ref 6–20)
CALCIUM SERPL-MCNC: 9 MG/DL (ref 8.7–10.5)
CHLORIDE SERPL-SCNC: 108 MMOL/L (ref 95–110)
CHOLEST SERPL-MCNC: 205 MG/DL (ref 120–199)
CHOLEST/HDLC SERPL: 5 {RATIO} (ref 2–5)
CO2 SERPL-SCNC: 27 MMOL/L (ref 23–29)
CREAT SERPL-MCNC: 0.7 MG/DL (ref 0.5–1.4)
DIFFERENTIAL METHOD: ABNORMAL
EOSINOPHIL # BLD AUTO: 0.2 K/UL (ref 0–0.5)
EOSINOPHIL NFR BLD: 2 % (ref 0–8)
ERYTHROCYTE [DISTWIDTH] IN BLOOD BY AUTOMATED COUNT: 13.3 % (ref 11.5–14.5)
EST. GFR  (AFRICAN AMERICAN): >60 ML/MIN/1.73 M^2
EST. GFR  (NON AFRICAN AMERICAN): >60 ML/MIN/1.73 M^2
GLUCOSE SERPL-MCNC: 95 MG/DL (ref 70–110)
HCT VFR BLD AUTO: 40.5 % (ref 37–48.5)
HDLC SERPL-MCNC: 41 MG/DL (ref 40–75)
HDLC SERPL: 20 % (ref 20–50)
HGB BLD-MCNC: 12.9 G/DL (ref 12–16)
LDLC SERPL CALC-MCNC: 139.6 MG/DL (ref 63–159)
LYMPHOCYTES # BLD AUTO: 2.5 K/UL (ref 1–4.8)
LYMPHOCYTES NFR BLD: 33.2 % (ref 18–48)
MCH RBC QN AUTO: 29.1 PG (ref 27–31)
MCHC RBC AUTO-ENTMCNC: 31.9 G/DL (ref 32–36)
MCV RBC AUTO: 91 FL (ref 82–98)
MONOCYTES # BLD AUTO: 0.3 K/UL (ref 0.3–1)
MONOCYTES NFR BLD: 4.5 % (ref 4–15)
NEUTROPHILS # BLD AUTO: 4.4 K/UL (ref 1.8–7.7)
NEUTROPHILS NFR BLD: 60.1 % (ref 38–73)
NONHDLC SERPL-MCNC: 164 MG/DL
PLATELET # BLD AUTO: 281 K/UL (ref 150–350)
PMV BLD AUTO: 10.4 FL (ref 9.2–12.9)
POTASSIUM SERPL-SCNC: 4 MMOL/L (ref 3.5–5.1)
PROT SERPL-MCNC: 7.4 G/DL (ref 6–8.4)
RBC # BLD AUTO: 4.44 M/UL (ref 4–5.4)
SODIUM SERPL-SCNC: 140 MMOL/L (ref 136–145)
TRIGL SERPL-MCNC: 122 MG/DL (ref 30–150)
TSH SERPL DL<=0.005 MIU/L-ACNC: 0.76 UIU/ML (ref 0.4–4)
WBC # BLD AUTO: 7.4 K/UL (ref 3.9–12.7)

## 2019-09-12 PROCEDURE — 80053 COMPREHEN METABOLIC PANEL: CPT

## 2019-09-12 PROCEDURE — 85025 COMPLETE CBC W/AUTO DIFF WBC: CPT

## 2019-09-12 PROCEDURE — 84443 ASSAY THYROID STIM HORMONE: CPT

## 2019-09-12 PROCEDURE — 80061 LIPID PANEL: CPT

## 2019-10-29 ENCOUNTER — OFFICE VISIT (OUTPATIENT)
Dept: FAMILY MEDICINE | Facility: CLINIC | Age: 39
End: 2019-10-29
Payer: COMMERCIAL

## 2019-10-29 VITALS
BODY MASS INDEX: 31.97 KG/M2 | HEIGHT: 58 IN | TEMPERATURE: 98 F | HEART RATE: 82 BPM | DIASTOLIC BLOOD PRESSURE: 74 MMHG | WEIGHT: 152.31 LBS | OXYGEN SATURATION: 97 % | SYSTOLIC BLOOD PRESSURE: 118 MMHG

## 2019-10-29 DIAGNOSIS — B34.9 SYSTEMIC VIRAL ILLNESS: Primary | ICD-10-CM

## 2019-10-29 PROCEDURE — 99214 OFFICE O/P EST MOD 30 MIN: CPT | Mod: 25,S$GLB,, | Performed by: INTERNAL MEDICINE

## 2019-10-29 PROCEDURE — 96372 THER/PROPH/DIAG INJ SC/IM: CPT | Mod: S$GLB,,, | Performed by: INTERNAL MEDICINE

## 2019-10-29 PROCEDURE — 96372 PR INJECTION,THERAP/PROPH/DIAG2ST, IM OR SUBCUT: ICD-10-PCS | Mod: S$GLB,,, | Performed by: INTERNAL MEDICINE

## 2019-10-29 PROCEDURE — 3008F BODY MASS INDEX DOCD: CPT | Mod: CPTII,S$GLB,, | Performed by: INTERNAL MEDICINE

## 2019-10-29 PROCEDURE — 3008F PR BODY MASS INDEX (BMI) DOCUMENTED: ICD-10-PCS | Mod: CPTII,S$GLB,, | Performed by: INTERNAL MEDICINE

## 2019-10-29 PROCEDURE — 99214 PR OFFICE/OUTPT VISIT, EST, LEVL IV, 30-39 MIN: ICD-10-PCS | Mod: 25,S$GLB,, | Performed by: INTERNAL MEDICINE

## 2019-10-29 PROCEDURE — 99999 PR PBB SHADOW E&M-EST. PATIENT-LVL III: CPT | Mod: PBBFAC,,, | Performed by: INTERNAL MEDICINE

## 2019-10-29 PROCEDURE — 99999 PR PBB SHADOW E&M-EST. PATIENT-LVL III: ICD-10-PCS | Mod: PBBFAC,,, | Performed by: INTERNAL MEDICINE

## 2019-10-29 RX ORDER — TRIAMCINOLONE ACETONIDE 40 MG/ML
40 INJECTION, SUSPENSION INTRA-ARTICULAR; INTRAMUSCULAR
Status: COMPLETED | OUTPATIENT
Start: 2019-10-29 | End: 2019-10-29

## 2019-10-29 RX ORDER — BENZONATATE 200 MG/1
200 CAPSULE ORAL 3 TIMES DAILY PRN
Qty: 30 CAPSULE | Refills: 0 | Status: SHIPPED | OUTPATIENT
Start: 2019-10-29 | End: 2019-11-08

## 2019-10-29 RX ADMIN — TRIAMCINOLONE ACETONIDE 40 MG: 40 INJECTION, SUSPENSION INTRA-ARTICULAR; INTRAMUSCULAR at 04:10

## 2019-10-29 NOTE — PROGRESS NOTES
SUBJECTIVE     Chief Complaint   Patient presents with    Sore Throat    Otalgia     left ear    Cough       HPI  Crystal Flores is a 39 y.o. female with multiple medical diagnoses as listed in the medical history and problem list that presents for evaluation of URI x 2 days. Pt reports generalized body aches, itchy throat, chills, L otalgia, SOB and dry cough. Denies any fever or night sweats. Pt has been taking Ibuprofen with relief of pain. Denies any recent travel. +sick contacts(son with Influenza A and co-workers with URI).    PAST MEDICAL HISTORY:  History reviewed. No pertinent past medical history.    PAST SURGICAL HISTORY:  Past Surgical History:   Procedure Laterality Date     SECTION      HYSTERECTOMY      TUBAL LIGATION         SOCIAL HISTORY:  Social History     Socioeconomic History    Marital status: Single     Spouse name: Not on file    Number of children: Not on file    Years of education: Not on file    Highest education level: Not on file   Occupational History    Not on file   Social Needs    Financial resource strain: Not on file    Food insecurity:     Worry: Not on file     Inability: Not on file    Transportation needs:     Medical: Not on file     Non-medical: Not on file   Tobacco Use    Smoking status: Never Smoker   Substance and Sexual Activity    Alcohol use: Not on file    Drug use: Not on file    Sexual activity: Not on file   Lifestyle    Physical activity:     Days per week: Not on file     Minutes per session: Not on file    Stress: Not at all   Relationships    Social connections:     Talks on phone: Not on file     Gets together: Not on file     Attends Evangelical service: Not on file     Active member of club or organization: Not on file     Attends meetings of clubs or organizations: Not on file     Relationship status: Not on file   Other Topics Concern    Not on file   Social History Narrative    Not on file       FAMILY HISTORY:  Family History  "  Problem Relation Age of Onset    Diabetes Maternal Grandmother     Stroke Maternal Grandmother        ALLERGIES AND MEDICATIONS: updated and reviewed.  Review of patient's allergies indicates:   Allergen Reactions    Codeine Hives     Current Outpatient Medications   Medication Sig Dispense Refill    benzonatate (TESSALON) 200 MG capsule Take 1 capsule (200 mg total) by mouth 3 (three) times daily as needed. 30 capsule 0     Current Facility-Administered Medications   Medication Dose Route Frequency Provider Last Rate Last Dose    triamcinolone acetonide injection 40 mg  40 mg Intramuscular 1 time in Clinic/HOD Isha Espinal MD           ROS  Review of Systems   Constitutional: Positive for chills. Negative for fever.   HENT: Positive for ear pain (left) and sore throat. Negative for hearing loss.    Eyes: Negative for visual disturbance.   Respiratory: Positive for cough and shortness of breath.    Cardiovascular: Negative for chest pain, palpitations and leg swelling.   Gastrointestinal: Negative for abdominal pain, constipation, diarrhea, nausea and vomiting.   Genitourinary: Negative for dysuria, frequency and urgency.   Musculoskeletal: Positive for myalgias. Negative for arthralgias and joint swelling.   Skin: Negative for rash and wound.   Neurological: Negative for headaches.   Psychiatric/Behavioral: Negative for agitation and confusion. The patient is not nervous/anxious.          OBJECTIVE     Physical Exam  Vitals:    10/29/19 1546   BP: 118/74   Pulse: 82   Temp: 98.2 °F (36.8 °C)    Body mass index is 31.84 kg/m².  Weight: 69.1 kg (152 lb 5.4 oz)   Height: 4' 10" (147.3 cm)     Physical Exam   Constitutional: She is oriented to person, place, and time. She appears well-developed and well-nourished. No distress.   HENT:   Head: Normocephalic and atraumatic.   Right Ear: Hearing, tympanic membrane and external ear normal.   Left Ear: Hearing, tympanic membrane and external ear normal.   Nose: No " rhinorrhea. Right sinus exhibits no maxillary sinus tenderness and no frontal sinus tenderness. Left sinus exhibits maxillary sinus tenderness. Left sinus exhibits no frontal sinus tenderness.   Mouth/Throat: Oropharynx is clear and moist. No uvula swelling. No posterior oropharyngeal edema or posterior oropharyngeal erythema.   Eyes: Conjunctivae and EOM are normal. Right eye exhibits no discharge. Left eye exhibits no discharge. No scleral icterus.   Neck: Normal range of motion. Neck supple. No JVD present. No tracheal deviation present.   Cardiovascular: Normal rate, regular rhythm and intact distal pulses. Exam reveals no gallop and no friction rub.   No murmur heard.  Pulmonary/Chest: Effort normal and breath sounds normal. No respiratory distress. She has no wheezes.   Abdominal: Soft. Bowel sounds are normal. She exhibits no distension and no mass. There is no tenderness. There is no rebound and no guarding.   Musculoskeletal: Normal range of motion. She exhibits no edema, tenderness or deformity.   Neurological: She is alert and oriented to person, place, and time. She exhibits normal muscle tone. Coordination normal.   Skin: Skin is warm and dry. No rash noted. No erythema.   Psychiatric: She has a normal mood and affect. Her behavior is normal. Judgment and thought content normal.         Health Maintenance       Date Due Completion Date    TETANUS VACCINE 02/04/1998 ---    Influenza Vaccine (1) 09/01/2019 ---    Pap Smear with HPV Cotest 07/05/2024 7/5/2019            ASSESSMENT     39 y.o. female with     1. Systemic viral illness        PLAN:     1. Systemic viral illness  - Continue symptomatic treatment with rest, increase fluid intake, tylenol or ibuprofen PRN fever(temp >/= 100.4) or body aches. Okay to take OTC antihistamines, i.e. Bendaryl, Claritin, Allegra, etc. as needed.  - Okay to gargle with warm, salt water or use throat lozenges as needed  - benzonatate (TESSALON) 200 MG capsule; Take 1  capsule (200 mg total) by mouth 3 (three) times daily as needed.  Dispense: 30 capsule; Refill: 0  - triamcinolone acetonide injection 40 mg  - POCT Influenza A/B Molecular; neg      RTC in 1-2 weeks as needed for any acute worsening of current condition or failure to improve       Isha Espinal MD  10/29/2019 4:02 PM        No follow-ups on file.

## 2019-11-22 ENCOUNTER — PATIENT OUTREACH (OUTPATIENT)
Dept: ADMINISTRATIVE | Facility: OTHER | Age: 39
End: 2019-11-22

## 2019-11-26 ENCOUNTER — OFFICE VISIT (OUTPATIENT)
Dept: UROLOGY | Facility: CLINIC | Age: 39
End: 2019-11-26
Payer: COMMERCIAL

## 2019-11-26 VITALS
SYSTOLIC BLOOD PRESSURE: 122 MMHG | BODY MASS INDEX: 31.93 KG/M2 | DIASTOLIC BLOOD PRESSURE: 80 MMHG | WEIGHT: 152.13 LBS | HEIGHT: 58 IN

## 2019-11-26 DIAGNOSIS — N39.41 URGE INCONTINENCE: ICD-10-CM

## 2019-11-26 DIAGNOSIS — S37.20XS INJURY OF BLADDER, SEQUELA: ICD-10-CM

## 2019-11-26 DIAGNOSIS — N32.81 OAB (OVERACTIVE BLADDER): Primary | ICD-10-CM

## 2019-11-26 PROCEDURE — 99999 PR PBB SHADOW E&M-EST. PATIENT-LVL III: CPT | Mod: PBBFAC,,, | Performed by: UROLOGY

## 2019-11-26 PROCEDURE — 99999 PR PBB SHADOW E&M-EST. PATIENT-LVL III: ICD-10-PCS | Mod: PBBFAC,,, | Performed by: UROLOGY

## 2019-11-26 PROCEDURE — 99244 OFF/OP CNSLTJ NEW/EST MOD 40: CPT | Mod: S$GLB,,, | Performed by: UROLOGY

## 2019-11-26 PROCEDURE — 99244 PR OFFICE CONSULTATION,LEVEL IV: ICD-10-PCS | Mod: S$GLB,,, | Performed by: UROLOGY

## 2019-11-26 RX ORDER — OXYBUTYNIN CHLORIDE 5 MG/1
5 TABLET, EXTENDED RELEASE ORAL DAILY
Qty: 30 TABLET | Refills: 11 | Status: SHIPPED | OUTPATIENT
Start: 2019-11-26 | End: 2020-03-28 | Stop reason: SDUPTHER

## 2019-11-26 NOTE — LETTER
November 26, 2019      Cyril Long MD  [address]  [address]  [place] [address]           [facility]  [address]   [place] [address]  Phone: [phone]  Fax: [phone]          Patient: Crystal Flores   MR Number: [id number]   YOB: 1980   Date of Visit: 11/26/2019       Dear Dr. Cyril Long:    Thank you for referring Crystal Flores to me for evaluation. Attached you will find relevant portions of my assessment and plan of care.    If you have questions, please do not hesitate to call me. I look forward to following Crystal Flores along with you.    Sincerely,    Kathleen Cormier MD    Enclosure  CC:  No Recipients    If you would like to receive this communication electronically, please contact [email or web address] or [phone] to request more information on [employer] Link access.    For providers and/or their staff who would like to refer a patient to [facility], please contact us through our one-stop-shop provider referral line, [facility], at [phone].    If you feel you have received this communication in error or would no longer like to receive these types of communications, please e-mail [email or web address]

## 2019-11-26 NOTE — PROGRESS NOTES
"  Subjective:       Crystal Flores is a 39 y.o. female who is a new patient who was referred by Dr Long for evaluation of bladder spasms.      She has seen Dr P Labadie in the past for similar. She is s/p cystotomy closure noted after hysterectomy (10/18) requiring ex-lap two days later. She c/o OAB symptoms - daytime frequency q1h and nocturia x 2-3. +urgency. She was previously given Ditropan 10mg but has since stopped that. She continues to report symptoms of urgency and UUI. She reports her main symptom is "pressure" that is worsened by full bladder. She c/o dysuria and spasms that continue for 30 minutes after voiding - this only present for about 6 months. "Few" UTIs since hysterectomy (two UCx negative in Epic). Denies hematuria, flank pain.      She also reports ASHLY and UUI that is new since hysterectomy.    PVR (bladder scan) today - 46cc       The following portions of the patient's history were reviewed and updated as appropriate: allergies, current medications, past family history, past medical history, past social history, past surgical history and problem list.    Review of Systems  Constitutional: no fever or chills  ENT: no nasal congestion or sore throat  Respiratory: no cough or shortness of breath  Cardiovascular: no chest pain or palpitations  Gastrointestinal: no nausea or vomiting, tolerating diet  Genitourinary: as per HPI  Hematologic/Lymphatic: no easy bruising or lymphadenopathy  Musculoskeletal: no arthralgias or myalgias  Skin: no rashes or lesions  Neurological: no seizures or tremors  Behavioral/Psych: no auditory or visual hallucinations        Objective:    Vitals: /80   Ht 4' 10" (1.473 m)   Wt 69 kg (152 lb 1.9 oz)   LMP 06/25/2018   BMI 31.79 kg/m²     Physical Exam   General: well developed, well nourished in no acute distress  Head: normocephalic, atraumatic  Neck: supple, trachea midline, no obvious enlargement of thyroid  HEENT: EOMI, mucus membranes moist, sclera " anicteric, no hearing impairment  Lungs: symmetric expansion, non-labored breathing  Cardiovascular: regular rate and rhythm, normal pulses  Abdomen: soft, non tender, non distended, no palpable masses, no hepatosplenomegaly, no hernias, no CVA tenderness  Musculoskeletal: no peripheral edema, normal ROM in bilateral upper and lower extremities  Lymphatics: no cervical or inguinal lymphadenopathy  Skin: no rashes or lesions  Neuro: alert and oriented x 3, no gross deficits  Psych: normal judgment and insight, normal mood/affect and non-anxious  Genitourinary:   patient declined exam      Lab Review   Urine analysis today in clinic shows positive for red blood cells 5-10    Lab Results   Component Value Date    WBC 7.40 09/12/2019    HGB 12.9 09/12/2019    HCT 40.5 09/12/2019    MCV 91 09/12/2019     09/12/2019     Lab Results   Component Value Date    CREATININE 0.7 09/12/2019    CREATININE 0.70 10/24/2018    BUN 14 09/12/2019       Imaging  NA         Assessment/Plan:      1. OAB (overactive bladder)    - Worsened since bladder repair 10/18   - Trial Ditropan 5mg   - Cystoscopy   - Dysuria - may consider Uribel      2. Mixed incontinence   - Discussed difference of UUI and ASHLY components. Reviewed etiology and workup of each.   - ASHLY: Kegels, PFPT, pessary, bulking agent, MUS.   - UUI: Behavioral changes, PFPT, anticholinergics, mirabegron. Botox/InterStim for refractory UUI.     3. Injury of bladder, sequela    - Open repair 2 days post-hysterectomy   - Cystoscopy to evaluate bladder anatomy. Would not expect this to have long term effect on voiding.          Follow up in 3-4 weeks for cysto

## 2019-12-19 ENCOUNTER — PROCEDURE VISIT (OUTPATIENT)
Dept: UROLOGY | Facility: CLINIC | Age: 39
End: 2019-12-19
Payer: COMMERCIAL

## 2019-12-19 VITALS — WEIGHT: 151.88 LBS | BODY MASS INDEX: 31.88 KG/M2 | HEIGHT: 58 IN

## 2019-12-19 DIAGNOSIS — S37.20XS INJURY OF BLADDER, SEQUELA: ICD-10-CM

## 2019-12-19 DIAGNOSIS — N32.81 OAB (OVERACTIVE BLADDER): ICD-10-CM

## 2019-12-19 DIAGNOSIS — N39.41 URGE INCONTINENCE: ICD-10-CM

## 2019-12-19 PROCEDURE — 52000 CYSTOURETHROSCOPY: CPT | Mod: S$GLB,,, | Performed by: UROLOGY

## 2019-12-19 PROCEDURE — 52000 CYSTOSCOPY: ICD-10-PCS | Mod: S$GLB,,, | Performed by: UROLOGY

## 2019-12-19 NOTE — PROCEDURES
"Cystoscopy  Date/Time: 12/19/2019 9:00 AM  Performed by: Kathleen Cormier MD  Authorized by: Kathleen Cormier MD     Consent Done?:  Yes (Written)  Time out: Immediately prior to procedure a "time out" was called to verify the correct patient, procedure, equipment, support staff and site/side marked as required.    Indications: overactive bladder and dysuria    Position:  Dorsal lithotomy  Anesthesia:  Lidocaine jelly  Patient sedated?: No    Preparation: Patient was prepped and draped in usual sterile fashion      Scope type:  Flexible cystoscope  Stent inserted: No    Stent removed: No    External exam normal: Yes    Digital exam performed: No    Urethra normal: Yes  Bladder neck normal: Bladder neck normal   Bladder normal: Yes (Very small area of scarring c/w prior cystotomy repair in posterior bladder - no abnormalities associated with this. Bladder otherwise normal. )      Patient tolerance:  Patient tolerated the procedure well with no immediate complications     Essentially normal cystoscopy  Doing well with Ditropan      "

## 2020-02-21 DIAGNOSIS — Z12.39 BREAST CANCER SCREENING: ICD-10-CM

## 2020-03-27 ENCOUNTER — OFFICE VISIT (OUTPATIENT)
Dept: FAMILY MEDICINE | Facility: CLINIC | Age: 40
End: 2020-03-27
Payer: COMMERCIAL

## 2020-03-27 DIAGNOSIS — J02.9 PHARYNGITIS, UNSPECIFIED ETIOLOGY: Primary | ICD-10-CM

## 2020-03-27 DIAGNOSIS — B34.9 VIRAL ILLNESS: ICD-10-CM

## 2020-03-27 PROCEDURE — 99214 PR OFFICE/OUTPT VISIT, EST, LEVL IV, 30-39 MIN: ICD-10-PCS | Mod: 95,,, | Performed by: INTERNAL MEDICINE

## 2020-03-27 PROCEDURE — 99214 OFFICE O/P EST MOD 30 MIN: CPT | Mod: 95,,, | Performed by: INTERNAL MEDICINE

## 2020-03-27 RX ORDER — PROMETHAZINE HYDROCHLORIDE AND DEXTROMETHORPHAN HYDROBROMIDE 6.25; 15 MG/5ML; MG/5ML
5 SYRUP ORAL 2 TIMES DAILY PRN
Qty: 120 ML | Refills: 0 | Status: SHIPPED | OUTPATIENT
Start: 2020-03-27 | End: 2020-04-06

## 2020-03-27 RX ORDER — AZITHROMYCIN 250 MG/1
TABLET, FILM COATED ORAL
Qty: 6 TABLET | Refills: 0 | Status: SHIPPED | OUTPATIENT
Start: 2020-03-27 | End: 2020-04-01

## 2020-03-27 NOTE — PROGRESS NOTES
SUBJECTIVE     No chief complaint on file.      HPI  Crystal Flores is a 40 y.o. female with multiple medical diagnoses as listed in the medical history and problem list that presents for evaluation of URI x 2-3 days. Pt reports a sore throat, body aches, fatigue, dry cough, headaches, and post-nasal drip. Denies any fever, but she has had chills and night sweats. Pt has been taking Excedrin, Zyrtec D, and salt water gargles with some improvement of symptoms. Denies any recent travel. +sick contacts(daughter with pharyngitis).    PAST MEDICAL HISTORY:  History reviewed. No pertinent past medical history.    PAST SURGICAL HISTORY:  Past Surgical History:   Procedure Laterality Date     SECTION      HYSTERECTOMY      TUBAL LIGATION         SOCIAL HISTORY:  Social History     Socioeconomic History    Marital status: Single     Spouse name: Not on file    Number of children: Not on file    Years of education: Not on file    Highest education level: Not on file   Occupational History    Not on file   Social Needs    Financial resource strain: Not on file    Food insecurity:     Worry: Not on file     Inability: Not on file    Transportation needs:     Medical: Not on file     Non-medical: Not on file   Tobacco Use    Smoking status: Never Smoker   Substance and Sexual Activity    Alcohol use: Not on file    Drug use: Not on file    Sexual activity: Not on file   Lifestyle    Physical activity:     Days per week: Not on file     Minutes per session: Not on file    Stress: Not at all   Relationships    Social connections:     Talks on phone: Not on file     Gets together: Not on file     Attends Anglican service: Not on file     Active member of club or organization: Not on file     Attends meetings of clubs or organizations: Not on file     Relationship status: Not on file   Other Topics Concern    Not on file   Social History Narrative    Not on file       FAMILY HISTORY:  Family History    Problem Relation Age of Onset    Diabetes Maternal Grandmother     Stroke Maternal Grandmother        ALLERGIES AND MEDICATIONS: updated and reviewed.  Review of patient's allergies indicates:   Allergen Reactions    Codeine Hives     Current Outpatient Medications   Medication Sig Dispense Refill    azithromycin (Z-ANABEL) 250 MG tablet Take 2 tablets by mouth on day 1; Take 1 tablet by mouth on days 2-5 6 tablet 0    oxybutynin (DITROPAN-XL) 5 MG TR24 Take 1 tablet (5 mg total) by mouth once daily. 30 tablet 11    oxyCODONE-acetaminophen (PERCOCET) 5-325 mg per tablet Take 1 tablet by mouth every 4 (four) hours as needed for Pain. 20 tablet 0    promethazine-dextromethorphan (PROMETHAZINE-DM) 6.25-15 mg/5 mL Syrp Take 5 mLs by mouth 2 (two) times daily as needed. 120 mL 0     No current facility-administered medications for this visit.        ROS  Review of Systems   Constitutional: Positive for chills and fatigue. Negative for fever.   HENT: Positive for postnasal drip and sore throat. Negative for hearing loss.    Eyes: Negative for visual disturbance.   Respiratory: Positive for cough. Negative for shortness of breath.    Cardiovascular: Negative for chest pain, palpitations and leg swelling.   Gastrointestinal: Negative for abdominal pain, constipation, diarrhea, nausea and vomiting.   Genitourinary: Negative for dysuria, frequency and urgency.   Musculoskeletal: Positive for myalgias. Negative for arthralgias and joint swelling.   Skin: Negative for rash and wound.   Neurological: Positive for headaches.   Psychiatric/Behavioral: Negative for agitation and confusion. The patient is not nervous/anxious.          OBJECTIVE     Physical Exam  There were no vitals filed for this visit. There is no height or weight on file to calculate BMI.            Physical Exam   Constitutional: She is oriented to person, place, and time. She appears well-developed and well-nourished. No distress.   HENT:   Head:  Normocephalic and atraumatic.   Right Ear: External ear normal.   Left Ear: External ear normal.   Nose: Nose normal.   Mouth/Throat: Oropharynx is clear and moist.   Eyes: Conjunctivae and EOM are normal. Right eye exhibits no discharge. Left eye exhibits no discharge. No scleral icterus.   Neck: Normal range of motion. Neck supple. No JVD present. No tracheal deviation present.   Pulmonary/Chest: Effort normal. No respiratory distress.   Musculoskeletal: Normal range of motion. She exhibits no deformity.   Neurological: She is alert and oriented to person, place, and time. She exhibits normal muscle tone. Coordination normal.   Skin: Skin is warm and dry. No rash noted. No erythema.   Psychiatric: She has a normal mood and affect. Her behavior is normal. Judgment and thought content normal.         Health Maintenance       Date Due Completion Date    HIV Screening 02/04/1995 ---    TETANUS VACCINE 02/04/1998 ---    Influenza Vaccine (1) 09/01/2019 ---    Mammogram 02/04/2020 ---            ASSESSMENT     40 y.o. female with     1. Pharyngitis, unspecified etiology    2. Viral illness        PLAN:     1. Pharyngitis, unspecified etiology  - Pt advised to take Abx to completion  - azithromycin (Z-ANABEL) 250 MG tablet; Take 2 tablets by mouth on day 1; Take 1 tablet by mouth on days 2-5  Dispense: 6 tablet; Refill: 0  - promethazine-dextromethorphan (PROMETHAZINE-DM) 6.25-15 mg/5 mL Syrp; Take 5 mLs by mouth 2 (two) times daily as needed.  Dispense: 120 mL; Refill: 0    2. Viral illness  - Continue symptomatic treatment with rest, increase fluid intake, tylenol or ibuprofen PRN fever(temp >/= 100.4) or body aches. Okay to take OTC antihistamines, i.e. Bendaryl, Claritin, Allegra, etc. as needed.  - Okay to gargle with warm, salt water or use throat lozenges as needed  - Pt can not be ruled out for COVID19 as she does not meet criteria for testing at this time; work note provided today to remain at home until  asymptomatic for at least 24 hours without use of antipyretics  - Pt encouraged to isolate herself to prevent spread in home or throughout community and she voiced understanding        RTC in 1-2 weeks as needed for any acute worsening of current condition or failure to improve     Consult Start Time: 03/27/2020 13:25  Consult End Time: 03/27/2020 13:36            Isha Espinal MD  03/27/2020 1:25 PM        Follow up in about 2 weeks (around 4/10/2020), or if symptoms worsen or fail to improve.

## 2020-03-27 NOTE — LETTER
[address] ? [place], [address] ? Phone [phone] ? Fax [phone] ? [email or web address]          Return to Work/School    Patient: Crystal Flores  YOB: 1980   Date: 03/27/2020      To Whom It May Concern:     Crystal Flores was in contact with/seen in my office on 03/27/2020. COVID-19 is present in our communities across the state. There is limited testing for COVID at this time, so not all patients can be tested. In this situation, your employee meets the following criteria:     Crystal Flores has expressed a desire/need to be tested for COVID-19 virus and does have some symptoms (upper respiratory, fever, etc) but does not meet all the criteria for testing as defined by the Centers for Disease Control/Office of Public Health at this time. The employee can return to work once they are asymptomatic for 72 hours without the use of fever reducing medications (Tylenol, Motrin, etc). Infection control policies of the employer should be followed, as well as good hand hygiene.     If you have any questions or concerns, or if I can be of further assistance, please do not hesitate to contact me.     Sincerely,    Isha Espinal MD

## 2020-03-28 RX ORDER — OXYBUTYNIN CHLORIDE 5 MG/1
5 TABLET, EXTENDED RELEASE ORAL DAILY
Qty: 30 TABLET | Refills: 11 | Status: SHIPPED | OUTPATIENT
Start: 2020-03-28 | End: 2021-06-14

## 2020-04-08 ENCOUNTER — TELEPHONE (OUTPATIENT)
Dept: FAMILY MEDICINE | Facility: CLINIC | Age: 40
End: 2020-04-08

## 2020-04-08 DIAGNOSIS — Z20.822 SUSPECTED COVID-19 VIRUS INFECTION: Primary | ICD-10-CM

## 2020-04-08 NOTE — TELEPHONE ENCOUNTER
----- Message from Daniella Early sent at 4/8/2020  4:02 PM CDT -----  Contact: Patient   Type:  Needs Medical Advice/Symptom-based Call    Who Called: Patient     Symptoms (please be specific): Cough worsen     How long has patient had these symptoms:  3 days     Would the patient rather a call back or a response via [facility]? Call back     Best Call Back Number: [phone]      Additional Information:   [id number] - [place] - [facility] [address] [facility] [address] [address] [address]  [place] [address]  Phone: [phone] Fax: [phone]

## 2020-04-09 RX ORDER — BENZONATATE 200 MG/1
200 CAPSULE ORAL 3 TIMES DAILY PRN
Qty: 30 CAPSULE | Refills: 0 | Status: SHIPPED | OUTPATIENT
Start: 2020-04-09 | End: 2020-04-19

## 2020-04-09 NOTE — TELEPHONE ENCOUNTER
Please inform pt that Tessalon Perles have been sent for her cough, but I'd like her to have COVID19 testing done at [place]. Please assist with scheduling.

## 2020-04-09 NOTE — TELEPHONE ENCOUNTER
Call placed to Pt, and scheduled for Covid-19 test and informed that medication was sent to her Pharmacy. Pt acknowledged understanding.

## 2020-04-13 ENCOUNTER — CLINICAL SUPPORT (OUTPATIENT)
Dept: FAMILY MEDICINE | Facility: CLINIC | Age: 40
End: 2020-04-13
Attending: INTERNAL MEDICINE
Payer: COMMERCIAL

## 2020-04-13 ENCOUNTER — TELEPHONE (OUTPATIENT)
Dept: FAMILY MEDICINE | Facility: CLINIC | Age: 40
End: 2020-04-13

## 2020-04-13 DIAGNOSIS — Z20.822 SUSPECTED COVID-19 VIRUS INFECTION: ICD-10-CM

## 2020-04-13 PROCEDURE — U0002 COVID-19 LAB TEST NON-CDC: HCPCS

## 2020-04-14 LAB — SARS-COV-2 RNA RESP QL NAA+PROBE: NOT DETECTED

## 2020-07-02 ENCOUNTER — HOSPITAL ENCOUNTER (OUTPATIENT)
Dept: RADIOLOGY | Facility: HOSPITAL | Age: 40
Discharge: HOME | End: 2020-07-02
Attending: INTERNAL MEDICINE
Payer: COMMERCIAL

## 2020-07-02 VITALS — WEIGHT: 151 LBS | HEIGHT: 58 IN | BODY MASS INDEX: 31.7 KG/M2

## 2020-07-02 DIAGNOSIS — Z12.31 ENCOUNTER FOR SCREENING MAMMOGRAM FOR BREAST CANCER: ICD-10-CM

## 2020-07-02 DIAGNOSIS — Z12.39 BREAST CANCER SCREENING: ICD-10-CM

## 2020-07-02 PROCEDURE — 77067 SCR MAMMO BI INCL CAD: CPT | Mod: 26,,, | Performed by: RADIOLOGY

## 2020-07-02 PROCEDURE — 77063 MAMMO DIGITAL SCREENING BILAT WITH TOMOSYNTHESIS_CAD: ICD-10-PCS | Mod: 26,,, | Performed by: RADIOLOGY

## 2020-07-02 PROCEDURE — 77067 SCR MAMMO BI INCL CAD: CPT | Mod: TC

## 2020-07-02 PROCEDURE — 77067 MAMMO DIGITAL SCREENING BILAT WITH TOMOSYNTHESIS_CAD: ICD-10-PCS | Mod: 26,,, | Performed by: RADIOLOGY

## 2020-07-02 PROCEDURE — 77063 BREAST TOMOSYNTHESIS BI: CPT | Mod: 26,,, | Performed by: RADIOLOGY

## 2020-07-08 ENCOUNTER — OFFICE VISIT (OUTPATIENT)
Dept: FAMILY MEDICINE | Facility: CLINIC | Age: 40
End: 2020-07-08
Payer: COMMERCIAL

## 2020-07-08 DIAGNOSIS — F32.9 REACTIVE DEPRESSION: Primary | ICD-10-CM

## 2020-07-08 PROCEDURE — 99214 OFFICE O/P EST MOD 30 MIN: CPT | Mod: 95,,, | Performed by: INTERNAL MEDICINE

## 2020-07-08 PROCEDURE — 99214 PR OFFICE/OUTPT VISIT, EST, LEVL IV, 30-39 MIN: ICD-10-PCS | Mod: 95,,, | Performed by: INTERNAL MEDICINE

## 2020-07-08 RX ORDER — BUPROPION HYDROCHLORIDE 150 MG/1
150 TABLET ORAL DAILY
Qty: 30 TABLET | Refills: 0 | Status: SHIPPED | OUTPATIENT
Start: 2020-07-08 | End: 2020-08-03 | Stop reason: SDUPTHER

## 2020-07-08 NOTE — PROGRESS NOTES
SUBJECTIVE     No chief complaint on file.      HPI  Crystal Flores is a 40 y.o. female with multiple medical diagnoses as listed in the medical history and problem list that presents for evaluation of depression x 2-3 months. Pt reports she feels really irritable, has difficulty sleeping, and does not feel like her normal self. She denies any decreased interest or feelings of guilt. +decreased concentration and increased appetite which has led to a 10lb weight gain. Denies any SI/HI. Pt reports this feels the same way as when she was diagnosed with post-partum depression 15 years ago when she required Prozac for a total of 6 months.     PAST MEDICAL HISTORY:  History reviewed. No pertinent past medical history.    PAST SURGICAL HISTORY:  Past Surgical History:   Procedure Laterality Date     SECTION      HYSTERECTOMY      TOTAL REDUCTION MAMMOPLASTY      TUBAL LIGATION         SOCIAL HISTORY:  Social History     Socioeconomic History    Marital status: Single     Spouse name: Not on file    Number of children: Not on file    Years of education: Not on file    Highest education level: Not on file   Occupational History    Not on file   Social Needs    Financial resource strain: Not on file    Food insecurity     Worry: Not on file     Inability: Not on file    Transportation needs     Medical: Not on file     Non-medical: Not on file   Tobacco Use    Smoking status: Never Smoker   Substance and Sexual Activity    Alcohol use: Not on file    Drug use: Not on file    Sexual activity: Not on file   Lifestyle    Physical activity     Days per week: Not on file     Minutes per session: Not on file    Stress: Not at all   Relationships    Social connections     Talks on phone: Not on file     Gets together: Not on file     Attends Christianity service: Not on file     Active member of club or organization: Not on file     Attends meetings of clubs or organizations: Not on file     Relationship  status: Not on file   Other Topics Concern    Not on file   Social History Narrative    Not on file       FAMILY HISTORY:  Family History   Problem Relation Age of Onset    Diabetes Maternal Grandmother     Stroke Maternal Grandmother        ALLERGIES AND MEDICATIONS: updated and reviewed.  Review of patient's allergies indicates:   Allergen Reactions    Codeine Hives     Current Outpatient Medications   Medication Sig Dispense Refill    buPROPion (WELLBUTRIN XL) 150 MG TB24 tablet Take 1 tablet (150 mg total) by mouth once daily. 30 tablet 0    oxybutynin (DITROPAN-XL) 5 MG TR24 Take 1 tablet (5 mg total) by mouth once daily. 30 tablet 11    oxyCODONE-acetaminophen (PERCOCET) 5-325 mg per tablet Take 1 tablet by mouth every 4 (four) hours as needed for Pain. 20 tablet 0     No current facility-administered medications for this visit.        ROS  Review of Systems   Constitutional: Positive for activity change. Negative for unexpected weight change.   HENT: Negative for hearing loss, rhinorrhea and trouble swallowing.    Eyes: Negative for discharge and visual disturbance.   Respiratory: Negative for chest tightness and wheezing.    Cardiovascular: Negative for chest pain and palpitations.   Gastrointestinal: Negative for blood in stool, constipation, diarrhea and vomiting.   Endocrine: Negative for polydipsia and polyuria.   Genitourinary: Negative for difficulty urinating, dysuria, hematuria and menstrual problem.   Musculoskeletal: Negative for arthralgias, joint swelling and neck pain.   Skin: Negative for rash and wound.   Neurological: Positive for headaches. Negative for weakness.   Psychiatric/Behavioral: Positive for dysphoric mood. Negative for confusion, self-injury and suicidal ideas.         OBJECTIVE     Physical Exam  There were no vitals filed for this visit. There is no height or weight on file to calculate BMI.            Physical Exam  Constitutional:       General: She is not in acute  distress.     Appearance: She is well-developed.   HENT:      Head: Normocephalic and atraumatic.      Right Ear: External ear normal.      Left Ear: External ear normal.      Nose: Nose normal.   Eyes:      General: No scleral icterus.        Right eye: No discharge.         Left eye: No discharge.      Conjunctiva/sclera: Conjunctivae normal.   Neck:      Musculoskeletal: Normal range of motion and neck supple.      Vascular: No JVD.      Trachea: No tracheal deviation.   Pulmonary:      Effort: Pulmonary effort is normal. No respiratory distress.   Musculoskeletal: Normal range of motion.         General: No tenderness or deformity.   Skin:     General: Skin is warm and dry.      Findings: No erythema or rash.   Neurological:      Mental Status: She is alert and oriented to person, place, and time.      Motor: No abnormal muscle tone.      Coordination: Coordination normal.   Psychiatric:         Behavior: Behavior normal.         Thought Content: Thought content normal.         Judgment: Judgment normal.           Health Maintenance       Date Due Completion Date    HIV Screening 02/04/1995 ---    TETANUS VACCINE 02/04/1998 ---    Influenza Vaccine (1) 09/01/2020 ---    Mammogram 07/02/2022 7/2/2020            ASSESSMENT     40 y.o. female with     1. Reactive depression        PLAN:     1. Reactive depression  - Discussed some coping mechanisms to deal with stress  - Start trial Bupropion and pt to call and schedule appt with  for counseling  - buPROPion (WELLBUTRIN XL) 150 MG TB24 tablet; Take 1 tablet (150 mg total) by mouth once daily.  Dispense: 30 tablet; Refill: 0  - Ambulatory referral/consult to Psychiatry; Future        RTC in 4 weeks for repeat assessment of current treatment plan    Consult Start Time: 07/08/2020 07:10  Consult End Time: 07/08/2020 07:26      The patient location is: home  The chief complaint leading to consultation is: depression    Visit type: audiovisual    Face to  Face time with patient: 16 min  20 minutes of total time spent on the encounter, which includes face to face time and non-face to face time preparing to see the patient (eg, review of tests), Obtaining and/or reviewing separately obtained history, Documenting clinical information in the electronic or other health record, Independently interpreting results (not separately reported) and communicating results to the patient/family/caregiver, or Care coordination (not separately reported).         Each patient to whom he or she provides medical services by telemedicine is:  (1) informed of the relationship between the physician and patient and the respective role of any other health care provider with respect to management of the patient; and (2) notified that he or she may decline to receive medical services by telemedicine and may withdraw from such care at any time.    Notes:          Isha Espinal MD  07/08/2020 7:11 AM        No follow-ups on file.

## 2020-07-21 ENCOUNTER — TELEPHONE (OUTPATIENT)
Dept: FAMILY MEDICINE | Facility: CLINIC | Age: 40
End: 2020-07-21

## 2020-07-21 ENCOUNTER — OFFICE VISIT (OUTPATIENT)
Dept: FAMILY MEDICINE | Facility: CLINIC | Age: 40
End: 2020-07-21
Payer: COMMERCIAL

## 2020-07-21 DIAGNOSIS — J01.00 ACUTE MAXILLARY SINUSITIS, RECURRENCE NOT SPECIFIED: Primary | ICD-10-CM

## 2020-07-21 PROCEDURE — 99214 OFFICE O/P EST MOD 30 MIN: CPT | Mod: 95,,, | Performed by: INTERNAL MEDICINE

## 2020-07-21 PROCEDURE — 99214 PR OFFICE/OUTPT VISIT, EST, LEVL IV, 30-39 MIN: ICD-10-PCS | Mod: 95,,, | Performed by: INTERNAL MEDICINE

## 2020-07-21 RX ORDER — AMOXICILLIN 875 MG/1
875 TABLET, FILM COATED ORAL EVERY 12 HOURS
Qty: 20 TABLET | Refills: 0 | Status: SHIPPED | OUTPATIENT
Start: 2020-07-21 | End: 2020-07-31

## 2020-07-21 RX ORDER — CETIRIZINE HYDROCHLORIDE 10 MG/1
10 TABLET ORAL DAILY
Qty: 30 TABLET | Refills: 0 | Status: SHIPPED | OUTPATIENT
Start: 2020-07-21 | End: 2021-05-31 | Stop reason: SDUPTHER

## 2020-07-21 NOTE — TELEPHONE ENCOUNTER
----- Message from Rebecca Lopez sent at 7/21/2020 10:19 AM CDT -----      Name of Who is Calling: VIRAJ GRACE [[id number]]      What is the request in detail: Pt called said she's feeling achy, congestion, no taste, and non smell.Please contact to further discuss and advise.          Can the clinic reply by [facility]: N      What [facility] to [facility]: [phone]

## 2020-07-21 NOTE — PROGRESS NOTES
SUBJECTIVE     No chief complaint on file.      HPI  Crystal Flores is a 40 y.o. female with multiple medical diagnoses as listed in the medical history and problem list that presents for evaluation of URI since last Friday. Pt reports R eye swelling, sinus pressure, fatigue, post-nasal drip, sore throat, and increased pressure around her ears. Denies any fever, but she has had chills and night sweats. Pt has been taking Benadryl and Zyrtec with improvement of Zyrtec. Denies any sick contacts/recent travel.    PAST MEDICAL HISTORY:  History reviewed. No pertinent past medical history.    PAST SURGICAL HISTORY:  Past Surgical History:   Procedure Laterality Date     SECTION      HYSTERECTOMY      TOTAL REDUCTION MAMMOPLASTY      TUBAL LIGATION         SOCIAL HISTORY:  Social History     Socioeconomic History    Marital status: Single     Spouse name: Not on file    Number of children: Not on file    Years of education: Not on file    Highest education level: Not on file   Occupational History    Not on file   Social Needs    Financial resource strain: Not on file    Food insecurity     Worry: Not on file     Inability: Not on file    Transportation needs     Medical: Not on file     Non-medical: Not on file   Tobacco Use    Smoking status: Never Smoker   Substance and Sexual Activity    Alcohol use: Not on file    Drug use: Not on file    Sexual activity: Not on file   Lifestyle    Physical activity     Days per week: Not on file     Minutes per session: Not on file    Stress: Not at all   Relationships    Social connections     Talks on phone: Not on file     Gets together: Not on file     Attends Faith service: Not on file     Active member of club or organization: Not on file     Attends meetings of clubs or organizations: Not on file     Relationship status: Not on file   Other Topics Concern    Not on file   Social History Narrative    Not on file       FAMILY HISTORY:  Family  History   Problem Relation Age of Onset    Diabetes Maternal Grandmother     Stroke Maternal Grandmother        ALLERGIES AND MEDICATIONS: updated and reviewed.  Review of patient's allergies indicates:   Allergen Reactions    Codeine Hives     Current Outpatient Medications   Medication Sig Dispense Refill    amoxicillin (AMOXIL) 875 MG tablet Take 1 tablet (875 mg total) by mouth every 12 (twelve) hours. for 10 days 20 tablet 0    buPROPion (WELLBUTRIN XL) 150 MG TB24 tablet Take 1 tablet (150 mg total) by mouth once daily. 30 tablet 0    cetirizine (ZYRTEC) 10 MG tablet Take 1 tablet (10 mg total) by mouth once daily. 30 tablet 0    oxybutynin (DITROPAN-XL) 5 MG TR24 Take 1 tablet (5 mg total) by mouth once daily. 30 tablet 11    oxyCODONE-acetaminophen (PERCOCET) 5-325 mg per tablet Take 1 tablet by mouth every 4 (four) hours as needed for Pain. 20 tablet 0     No current facility-administered medications for this visit.        ROS  Review of Systems   Constitutional: Negative for activity change and unexpected weight change.   HENT: Positive for postnasal drip, rhinorrhea, sore throat and trouble swallowing. Negative for hearing loss.    Eyes: Positive for discharge. Negative for visual disturbance.   Respiratory: Negative for chest tightness and wheezing.    Cardiovascular: Negative for chest pain and palpitations.   Gastrointestinal: Negative for blood in stool, constipation, diarrhea and vomiting.   Genitourinary: Negative for difficulty urinating, dysuria, hematuria and menstrual problem.   Musculoskeletal: Negative for arthralgias, joint swelling and neck pain.   Skin: Negative for rash and wound.   Neurological: Positive for headaches. Negative for weakness.   Psychiatric/Behavioral: Negative for confusion and dysphoric mood.         OBJECTIVE     Physical Exam  There were no vitals filed for this visit. There is no height or weight on file to calculate BMI.            Physical  Exam  Constitutional:       General: She is not in acute distress.     Appearance: She is well-developed.   HENT:      Head: Normocephalic and atraumatic.      Right Ear: External ear normal.      Left Ear: External ear normal.      Nose:      Right Sinus: Maxillary sinus tenderness (TTP with pt palpating on exam) present. No frontal sinus tenderness.      Left Sinus: No maxillary sinus tenderness or frontal sinus tenderness.   Eyes:      General: No scleral icterus.        Right eye: No discharge.         Left eye: No discharge.      Conjunctiva/sclera: Conjunctivae normal.   Neck:      Musculoskeletal: Normal range of motion and neck supple.      Vascular: No JVD.      Trachea: No tracheal deviation.   Pulmonary:      Effort: Pulmonary effort is normal. No respiratory distress.   Musculoskeletal: Normal range of motion.         General: No tenderness or deformity.   Skin:     General: Skin is warm and dry.      Findings: No erythema or rash.   Neurological:      Mental Status: She is alert and oriented to person, place, and time.      Motor: No abnormal muscle tone.      Coordination: Coordination normal.   Psychiatric:         Behavior: Behavior normal.         Thought Content: Thought content normal.         Judgment: Judgment normal.           Health Maintenance       Date Due Completion Date    HIV Screening 02/04/1995 ---    TETANUS VACCINE 02/04/1998 ---    Influenza Vaccine (1) 09/01/2020 ---    Mammogram 07/02/2022 7/2/2020            ASSESSMENT     40 y.o. female with     1. Acute maxillary sinusitis, recurrence not specified        PLAN:     1. Acute maxillary sinusitis, recurrence not specified  - Pt advised to take Abx to completion  - Continue symptomatic treatment with rest, increase fluid intake, tylenol PRN fever(temp >/= 100.4) or body aches. Okay to take OTC antihistamines, i.e. Bendaryl, Claritin, Allegra, etc. as needed.  - Okay to gargle with warm, salt water or use throat lozenges as  needed  - Okay to use Flonase once daily  - amoxicillin (AMOXIL) 875 MG tablet; Take 1 tablet (875 mg total) by mouth every 12 (twelve) hours. for 10 days  Dispense: 20 tablet; Refill: 0  - cetirizine (ZYRTEC) 10 MG tablet; Take 1 tablet (10 mg total) by mouth once daily.  Dispense: 30 tablet; Refill: 0  - Low threshold to repeat COVID19 testing; monitor      RTC in 1-2 weeks as needed for any acute worsening of current condition or failure to improve    Consult Start Time: 07/21/2020 16:18  Consult End Time: 07/21/2020 16:29      The patient location is: home  The chief complaint leading to consultation is: sinus infection    Visit type: audiovisual    Face to Face time with patient: 11 min  15 minutes of total time spent on the encounter, which includes face to face time and non-face to face time preparing to see the patient (eg, review of tests), Obtaining and/or reviewing separately obtained history, Documenting clinical information in the electronic or other health record, Independently interpreting results (not separately reported) and communicating results to the patient/family/caregiver, or Care coordination (not separately reported).         Each patient to whom he or she provides medical services by telemedicine is:  (1) informed of the relationship between the physician and patient and the respective role of any other health care provider with respect to management of the patient; and (2) notified that he or she may decline to receive medical services by telemedicine and may withdraw from such care at any time.    Notes:            Isha Espinal MD  07/21/2020 4:19 PM        No follow-ups on file.

## 2020-08-03 DIAGNOSIS — F32.9 REACTIVE DEPRESSION: ICD-10-CM

## 2020-08-03 RX ORDER — BUPROPION HYDROCHLORIDE 150 MG/1
150 TABLET ORAL DAILY
Qty: 90 TABLET | Refills: 1 | Status: SHIPPED | OUTPATIENT
Start: 2020-08-03 | End: 2021-04-28 | Stop reason: SDUPTHER

## 2020-08-03 NOTE — TELEPHONE ENCOUNTER
Last Office Visit Info:   The patient's last visit with Isha Espinal MD was on 7/21/2020.    The patient's last visit in current department was on 7/21/2020.        Last CBC Results:   Lab Results   Component Value Date    WBC 7.40 09/12/2019    HGB 12.9 09/12/2019    HCT 40.5 09/12/2019     09/12/2019       Last CMP Results  Lab Results   Component Value Date     09/12/2019    K 4.0 09/12/2019     09/12/2019    CO2 27 09/12/2019    BUN 14 09/12/2019    CREATININE 0.7 09/12/2019    CALCIUM 9.0 09/12/2019    ALBUMIN 4.0 09/12/2019    AST 15 09/12/2019    ALT 16 09/12/2019       Last Lipids  Lab Results   Component Value Date    CHOL 205 (H) 09/12/2019    TRIG 122 09/12/2019    HDL 41 09/12/2019    LDLCALC 139.6 09/12/2019       Last A1C  No results found for: HGBA1C    Last TSH  Lab Results   Component Value Date    TSH 0.763 09/12/2019         Current Med Refills  Medication List with Changes/Refills   Current Medications    BUPROPION (WELLBUTRIN XL) 150 MG TB24 TABLET    Take 1 tablet (150 mg total) by mouth once daily.       Start Date: 7/8/2020  End Date: 7/8/2021    CETIRIZINE (ZYRTEC) 10 MG TABLET    Take 1 tablet (10 mg total) by mouth once daily.       Start Date: 7/21/2020 End Date: 7/21/2021    OXYBUTYNIN (DITROPAN-XL) 5 MG TR24    Take 1 tablet (5 mg total) by mouth once daily.       Start Date: 3/28/2020 End Date: 3/28/2021    OXYCODONE-ACETAMINOPHEN (PERCOCET) 5-325 MG PER TABLET    Take 1 tablet by mouth every 4 (four) hours as needed for Pain.       Start Date: 12/2/2019 End Date: --

## 2020-08-14 DIAGNOSIS — Z11.59 NEED FOR HEPATITIS C SCREENING TEST: ICD-10-CM

## 2021-03-03 ENCOUNTER — OFFICE VISIT (OUTPATIENT)
Dept: FAMILY MEDICINE | Facility: CLINIC | Age: 41
End: 2021-03-03
Payer: COMMERCIAL

## 2021-03-03 DIAGNOSIS — N30.00 ACUTE CYSTITIS WITHOUT HEMATURIA: Primary | ICD-10-CM

## 2021-03-03 PROCEDURE — 99214 OFFICE O/P EST MOD 30 MIN: CPT | Mod: 95,,, | Performed by: PHYSICIAN ASSISTANT

## 2021-03-03 PROCEDURE — 99214 PR OFFICE/OUTPT VISIT, EST, LEVL IV, 30-39 MIN: ICD-10-PCS | Mod: 95,,, | Performed by: PHYSICIAN ASSISTANT

## 2021-03-03 RX ORDER — NITROFURANTOIN 25; 75 MG/1; MG/1
100 CAPSULE ORAL 2 TIMES DAILY
Qty: 10 CAPSULE | Refills: 0 | Status: SHIPPED | OUTPATIENT
Start: 2021-03-03 | End: 2021-03-08

## 2021-04-28 ENCOUNTER — PATIENT MESSAGE (OUTPATIENT)
Dept: RESEARCH | Facility: HOSPITAL | Age: 41
End: 2021-04-28

## 2021-05-03 ENCOUNTER — IMMUNIZATION (OUTPATIENT)
Dept: OBSTETRICS AND GYNECOLOGY | Facility: CLINIC | Age: 41
End: 2021-05-03
Payer: COMMERCIAL

## 2021-05-03 DIAGNOSIS — Z23 NEED FOR VACCINATION: Primary | ICD-10-CM

## 2021-05-03 PROCEDURE — 91300 COVID-19, MRNA, LNP-S, PF, 30 MCG/0.3 ML DOSE VACCINE: CPT | Mod: PBBFAC | Performed by: FAMILY MEDICINE

## 2021-05-24 ENCOUNTER — IMMUNIZATION (OUTPATIENT)
Dept: OBSTETRICS AND GYNECOLOGY | Facility: CLINIC | Age: 41
End: 2021-05-24
Payer: COMMERCIAL

## 2021-05-24 DIAGNOSIS — Z23 NEED FOR VACCINATION: Primary | ICD-10-CM

## 2021-05-24 PROCEDURE — 91300 COVID-19, MRNA, LNP-S, PF, 30 MCG/0.3 ML DOSE VACCINE: CPT | Mod: PBBFAC | Performed by: FAMILY MEDICINE

## 2021-05-24 PROCEDURE — 0002A COVID-19, MRNA, LNP-S, PF, 30 MCG/0.3 ML DOSE VACCINE: CPT | Mod: PBBFAC | Performed by: FAMILY MEDICINE

## 2021-05-31 ENCOUNTER — OFFICE VISIT (OUTPATIENT)
Dept: FAMILY MEDICINE | Facility: CLINIC | Age: 41
End: 2021-05-31
Payer: COMMERCIAL

## 2021-05-31 VITALS
RESPIRATION RATE: 20 BRPM | WEIGHT: 144.38 LBS | HEIGHT: 58 IN | DIASTOLIC BLOOD PRESSURE: 76 MMHG | TEMPERATURE: 98 F | SYSTOLIC BLOOD PRESSURE: 130 MMHG | HEART RATE: 73 BPM | OXYGEN SATURATION: 98 % | BODY MASS INDEX: 30.31 KG/M2

## 2021-05-31 DIAGNOSIS — Z12.31 BREAST CANCER SCREENING BY MAMMOGRAM: ICD-10-CM

## 2021-05-31 DIAGNOSIS — J01.90 ACUTE SINUSITIS WITH SYMPTOMS GREATER THAN 10 DAYS: Primary | ICD-10-CM

## 2021-05-31 PROCEDURE — 99999 PR PBB SHADOW E&M-EST. PATIENT-LVL III: CPT | Mod: PBBFAC,,, | Performed by: INTERNAL MEDICINE

## 2021-05-31 PROCEDURE — 3008F PR BODY MASS INDEX (BMI) DOCUMENTED: ICD-10-PCS | Mod: CPTII,S$GLB,, | Performed by: INTERNAL MEDICINE

## 2021-05-31 PROCEDURE — 99999 PR PBB SHADOW E&M-EST. PATIENT-LVL III: ICD-10-PCS | Mod: PBBFAC,,, | Performed by: INTERNAL MEDICINE

## 2021-05-31 PROCEDURE — 99214 PR OFFICE/OUTPT VISIT, EST, LEVL IV, 30-39 MIN: ICD-10-PCS | Mod: S$GLB,,, | Performed by: INTERNAL MEDICINE

## 2021-05-31 PROCEDURE — 3008F BODY MASS INDEX DOCD: CPT | Mod: CPTII,S$GLB,, | Performed by: INTERNAL MEDICINE

## 2021-05-31 PROCEDURE — 1126F PR PAIN SEVERITY QUANTIFIED, NO PAIN PRESENT: ICD-10-PCS | Mod: S$GLB,,, | Performed by: INTERNAL MEDICINE

## 2021-05-31 PROCEDURE — 1126F AMNT PAIN NOTED NONE PRSNT: CPT | Mod: S$GLB,,, | Performed by: INTERNAL MEDICINE

## 2021-05-31 PROCEDURE — 99214 OFFICE O/P EST MOD 30 MIN: CPT | Mod: S$GLB,,, | Performed by: INTERNAL MEDICINE

## 2021-05-31 RX ORDER — CETIRIZINE HYDROCHLORIDE 10 MG/1
10 TABLET ORAL DAILY
Qty: 30 TABLET | Refills: 2 | Status: SHIPPED | OUTPATIENT
Start: 2021-05-31 | End: 2021-10-24 | Stop reason: SDUPTHER

## 2021-05-31 RX ORDER — AMOXICILLIN AND CLAVULANATE POTASSIUM 875; 125 MG/1; MG/1
1 TABLET, FILM COATED ORAL EVERY 12 HOURS
Qty: 20 TABLET | Refills: 0 | Status: SHIPPED | OUTPATIENT
Start: 2021-05-31 | End: 2021-06-14

## 2021-06-14 ENCOUNTER — OFFICE VISIT (OUTPATIENT)
Dept: FAMILY MEDICINE | Facility: CLINIC | Age: 41
End: 2021-06-14
Payer: COMMERCIAL

## 2021-06-14 VITALS
DIASTOLIC BLOOD PRESSURE: 86 MMHG | HEIGHT: 58 IN | SYSTOLIC BLOOD PRESSURE: 122 MMHG | TEMPERATURE: 98 F | OXYGEN SATURATION: 97 % | WEIGHT: 144.63 LBS | BODY MASS INDEX: 30.36 KG/M2 | HEART RATE: 84 BPM

## 2021-06-14 DIAGNOSIS — N83.209 CYST OF OVARY, UNSPECIFIED LATERALITY: ICD-10-CM

## 2021-06-14 DIAGNOSIS — Z00.00 ANNUAL PHYSICAL EXAM: Primary | ICD-10-CM

## 2021-06-14 DIAGNOSIS — Z11.59 ENCOUNTER FOR HEPATITIS C SCREENING TEST FOR LOW RISK PATIENT: ICD-10-CM

## 2021-06-14 DIAGNOSIS — Z11.4 ENCOUNTER FOR SCREENING FOR HIV: ICD-10-CM

## 2021-06-14 PROCEDURE — 1126F AMNT PAIN NOTED NONE PRSNT: CPT | Mod: S$GLB,,, | Performed by: INTERNAL MEDICINE

## 2021-06-14 PROCEDURE — 99396 PR PREVENTIVE VISIT,EST,40-64: ICD-10-PCS | Mod: S$GLB,,, | Performed by: INTERNAL MEDICINE

## 2021-06-14 PROCEDURE — 3008F PR BODY MASS INDEX (BMI) DOCUMENTED: ICD-10-PCS | Mod: CPTII,S$GLB,, | Performed by: INTERNAL MEDICINE

## 2021-06-14 PROCEDURE — 1126F PR PAIN SEVERITY QUANTIFIED, NO PAIN PRESENT: ICD-10-PCS | Mod: S$GLB,,, | Performed by: INTERNAL MEDICINE

## 2021-06-14 PROCEDURE — 99999 PR PBB SHADOW E&M-EST. PATIENT-LVL III: ICD-10-PCS | Mod: PBBFAC,,, | Performed by: INTERNAL MEDICINE

## 2021-06-14 PROCEDURE — 99999 PR PBB SHADOW E&M-EST. PATIENT-LVL III: CPT | Mod: PBBFAC,,, | Performed by: INTERNAL MEDICINE

## 2021-06-14 PROCEDURE — 99396 PREV VISIT EST AGE 40-64: CPT | Mod: S$GLB,,, | Performed by: INTERNAL MEDICINE

## 2021-06-14 PROCEDURE — 3008F BODY MASS INDEX DOCD: CPT | Mod: CPTII,S$GLB,, | Performed by: INTERNAL MEDICINE

## 2021-06-15 DIAGNOSIS — E78.1 PURE HYPERTRIGLYCERIDEMIA: Primary | ICD-10-CM

## 2021-06-15 PROBLEM — E78.2 MIXED HYPERLIPIDEMIA: Status: ACTIVE | Noted: 2021-06-15

## 2021-06-15 RX ORDER — ROSUVASTATIN CALCIUM 40 MG/1
40 TABLET, COATED ORAL NIGHTLY
Qty: 90 TABLET | Refills: 3 | Status: SHIPPED | OUTPATIENT
Start: 2021-06-15 | End: 2021-10-24 | Stop reason: SDUPTHER

## 2021-07-06 ENCOUNTER — HOSPITAL ENCOUNTER (OUTPATIENT)
Dept: RADIOLOGY | Facility: HOSPITAL | Age: 41
Discharge: HOME | End: 2021-07-06
Attending: INTERNAL MEDICINE
Payer: COMMERCIAL

## 2021-07-06 VITALS — WEIGHT: 144 LBS | BODY MASS INDEX: 30.23 KG/M2 | HEIGHT: 58 IN

## 2021-07-06 DIAGNOSIS — Z12.31 BREAST CANCER SCREENING BY MAMMOGRAM: ICD-10-CM

## 2021-07-06 PROCEDURE — 77063 BREAST TOMOSYNTHESIS BI: CPT | Mod: 26,,, | Performed by: RADIOLOGY

## 2021-07-06 PROCEDURE — 77067 SCR MAMMO BI INCL CAD: CPT | Mod: TC

## 2021-07-06 PROCEDURE — 77067 MAMMO DIGITAL SCREENING BILAT WITH TOMO: ICD-10-PCS | Mod: 26,,, | Performed by: RADIOLOGY

## 2021-07-06 PROCEDURE — 77067 SCR MAMMO BI INCL CAD: CPT | Mod: 26,,, | Performed by: RADIOLOGY

## 2021-07-06 PROCEDURE — 77063 MAMMO DIGITAL SCREENING BILAT WITH TOMO: ICD-10-PCS | Mod: 26,,, | Performed by: RADIOLOGY

## 2021-07-23 ENCOUNTER — OFFICE VISIT (OUTPATIENT)
Dept: OBSTETRICS AND GYNECOLOGY | Facility: CLINIC | Age: 41
End: 2021-07-23
Payer: COMMERCIAL

## 2021-07-23 VITALS
SYSTOLIC BLOOD PRESSURE: 122 MMHG | BODY MASS INDEX: 30.46 KG/M2 | WEIGHT: 145.75 LBS | DIASTOLIC BLOOD PRESSURE: 64 MMHG

## 2021-07-23 DIAGNOSIS — Z01.419 ENCOUNTER FOR GYNECOLOGICAL EXAMINATION WITHOUT ABNORMAL FINDING: Primary | ICD-10-CM

## 2021-07-23 DIAGNOSIS — Z12.39 SCREENING BREAST EXAMINATION: ICD-10-CM

## 2021-07-23 DIAGNOSIS — N83.209 CYST OF OVARY, UNSPECIFIED LATERALITY: ICD-10-CM

## 2021-07-23 PROCEDURE — 99999 PR PBB SHADOW E&M-EST. PATIENT-LVL III: CPT | Mod: PBBFAC,,, | Performed by: OBSTETRICS & GYNECOLOGY

## 2021-07-23 PROCEDURE — 99999 PR PBB SHADOW E&M-EST. PATIENT-LVL III: ICD-10-PCS | Mod: PBBFAC,,, | Performed by: OBSTETRICS & GYNECOLOGY

## 2021-07-23 PROCEDURE — 99386 PREV VISIT NEW AGE 40-64: CPT | Mod: S$GLB,,, | Performed by: OBSTETRICS & GYNECOLOGY

## 2021-07-23 PROCEDURE — 3008F BODY MASS INDEX DOCD: CPT | Mod: CPTII,S$GLB,, | Performed by: OBSTETRICS & GYNECOLOGY

## 2021-07-23 PROCEDURE — 3008F PR BODY MASS INDEX (BMI) DOCUMENTED: ICD-10-PCS | Mod: CPTII,S$GLB,, | Performed by: OBSTETRICS & GYNECOLOGY

## 2021-07-23 PROCEDURE — 99386 PR PREVENTIVE VISIT,NEW,40-64: ICD-10-PCS | Mod: S$GLB,,, | Performed by: OBSTETRICS & GYNECOLOGY

## 2021-07-27 ENCOUNTER — PATIENT MESSAGE (OUTPATIENT)
Dept: FAMILY MEDICINE | Facility: CLINIC | Age: 41
End: 2021-07-27

## 2021-09-16 ENCOUNTER — OFFICE VISIT (OUTPATIENT)
Dept: FAMILY MEDICINE | Facility: CLINIC | Age: 41
End: 2021-09-16
Payer: COMMERCIAL

## 2021-09-16 VITALS
DIASTOLIC BLOOD PRESSURE: 86 MMHG | HEIGHT: 58 IN | TEMPERATURE: 98 F | SYSTOLIC BLOOD PRESSURE: 122 MMHG | HEART RATE: 90 BPM | BODY MASS INDEX: 30.44 KG/M2 | OXYGEN SATURATION: 97 % | WEIGHT: 145 LBS

## 2021-09-16 DIAGNOSIS — J01.41 ACUTE RECURRENT PANSINUSITIS: Primary | ICD-10-CM

## 2021-09-16 DIAGNOSIS — H60.502 ACUTE OTITIS EXTERNA OF LEFT EAR, UNSPECIFIED TYPE: ICD-10-CM

## 2021-09-16 PROCEDURE — 3079F PR MOST RECENT DIASTOLIC BLOOD PRESSURE 80-89 MM HG: ICD-10-PCS | Mod: CPTII,S$GLB,, | Performed by: INTERNAL MEDICINE

## 2021-09-16 PROCEDURE — 3074F SYST BP LT 130 MM HG: CPT | Mod: CPTII,S$GLB,, | Performed by: INTERNAL MEDICINE

## 2021-09-16 PROCEDURE — 3044F HG A1C LEVEL LT 7.0%: CPT | Mod: CPTII,S$GLB,, | Performed by: INTERNAL MEDICINE

## 2021-09-16 PROCEDURE — 99214 OFFICE O/P EST MOD 30 MIN: CPT | Mod: S$GLB,,, | Performed by: INTERNAL MEDICINE

## 2021-09-16 PROCEDURE — 99999 PR PBB SHADOW E&M-EST. PATIENT-LVL III: ICD-10-PCS | Mod: PBBFAC,,, | Performed by: INTERNAL MEDICINE

## 2021-09-16 PROCEDURE — 3079F DIAST BP 80-89 MM HG: CPT | Mod: CPTII,S$GLB,, | Performed by: INTERNAL MEDICINE

## 2021-09-16 PROCEDURE — 3044F PR MOST RECENT HEMOGLOBIN A1C LEVEL <7.0%: ICD-10-PCS | Mod: CPTII,S$GLB,, | Performed by: INTERNAL MEDICINE

## 2021-09-16 PROCEDURE — 3074F PR MOST RECENT SYSTOLIC BLOOD PRESSURE < 130 MM HG: ICD-10-PCS | Mod: CPTII,S$GLB,, | Performed by: INTERNAL MEDICINE

## 2021-09-16 PROCEDURE — 3008F BODY MASS INDEX DOCD: CPT | Mod: CPTII,S$GLB,, | Performed by: INTERNAL MEDICINE

## 2021-09-16 PROCEDURE — 1159F MED LIST DOCD IN RCRD: CPT | Mod: CPTII,S$GLB,, | Performed by: INTERNAL MEDICINE

## 2021-09-16 PROCEDURE — 99214 PR OFFICE/OUTPT VISIT, EST, LEVL IV, 30-39 MIN: ICD-10-PCS | Mod: S$GLB,,, | Performed by: INTERNAL MEDICINE

## 2021-09-16 PROCEDURE — 1159F PR MEDICATION LIST DOCUMENTED IN MEDICAL RECORD: ICD-10-PCS | Mod: CPTII,S$GLB,, | Performed by: INTERNAL MEDICINE

## 2021-09-16 PROCEDURE — 1160F PR REVIEW ALL MEDS BY PRESCRIBER/CLIN PHARMACIST DOCUMENTED: ICD-10-PCS | Mod: CPTII,S$GLB,, | Performed by: INTERNAL MEDICINE

## 2021-09-16 PROCEDURE — 99999 PR PBB SHADOW E&M-EST. PATIENT-LVL III: CPT | Mod: PBBFAC,,, | Performed by: INTERNAL MEDICINE

## 2021-09-16 PROCEDURE — 1160F RVW MEDS BY RX/DR IN RCRD: CPT | Mod: CPTII,S$GLB,, | Performed by: INTERNAL MEDICINE

## 2021-09-16 PROCEDURE — 3008F PR BODY MASS INDEX (BMI) DOCUMENTED: ICD-10-PCS | Mod: CPTII,S$GLB,, | Performed by: INTERNAL MEDICINE

## 2021-09-16 RX ORDER — METHYLPREDNISOLONE 4 MG/1
TABLET ORAL
Qty: 1 PACKAGE | Refills: 0 | Status: SHIPPED | OUTPATIENT
Start: 2021-09-17 | End: 2021-12-09

## 2021-09-16 RX ORDER — OFLOXACIN 3 MG/ML
10 SOLUTION AURICULAR (OTIC) DAILY
Qty: 5 ML | Refills: 0 | Status: SHIPPED | OUTPATIENT
Start: 2021-09-16 | End: 2021-09-23

## 2021-09-21 ENCOUNTER — TELEPHONE (OUTPATIENT)
Dept: FAMILY MEDICINE | Facility: CLINIC | Age: 41
End: 2021-09-21
Payer: COMMERCIAL

## 2021-10-24 DIAGNOSIS — E78.1 PURE HYPERTRIGLYCERIDEMIA: ICD-10-CM

## 2021-10-24 DIAGNOSIS — J01.90 ACUTE SINUSITIS WITH SYMPTOMS GREATER THAN 10 DAYS: ICD-10-CM

## 2021-10-25 RX ORDER — ROSUVASTATIN CALCIUM 40 MG/1
40 TABLET, COATED ORAL NIGHTLY
Qty: 90 TABLET | Refills: 3 | Status: SHIPPED | OUTPATIENT
Start: 2021-10-25 | End: 2023-01-03 | Stop reason: SDUPTHER

## 2021-10-25 RX ORDER — CETIRIZINE HYDROCHLORIDE 10 MG/1
10 TABLET ORAL DAILY
Qty: 30 TABLET | Refills: 2 | Status: SHIPPED | OUTPATIENT
Start: 2021-10-25 | End: 2022-10-25

## 2021-12-09 ENCOUNTER — OFFICE VISIT (OUTPATIENT)
Dept: FAMILY MEDICINE | Facility: CLINIC | Age: 41
End: 2021-12-09
Payer: COMMERCIAL

## 2021-12-09 VITALS
HEART RATE: 80 BPM | OXYGEN SATURATION: 95 % | SYSTOLIC BLOOD PRESSURE: 124 MMHG | WEIGHT: 145.5 LBS | HEIGHT: 58 IN | TEMPERATURE: 99 F | BODY MASS INDEX: 30.54 KG/M2 | DIASTOLIC BLOOD PRESSURE: 84 MMHG

## 2021-12-09 DIAGNOSIS — R05.9 COUGH: ICD-10-CM

## 2021-12-09 DIAGNOSIS — M94.0 COSTOCHONDRITIS: ICD-10-CM

## 2021-12-09 DIAGNOSIS — R11.0 NAUSEA: ICD-10-CM

## 2021-12-09 DIAGNOSIS — R42 VERTIGO: Primary | ICD-10-CM

## 2021-12-09 PROCEDURE — 99214 PR OFFICE/OUTPT VISIT, EST, LEVL IV, 30-39 MIN: ICD-10-PCS | Mod: 25,S$GLB,, | Performed by: INTERNAL MEDICINE

## 2021-12-09 PROCEDURE — 99214 OFFICE O/P EST MOD 30 MIN: CPT | Mod: 25,S$GLB,, | Performed by: INTERNAL MEDICINE

## 2021-12-09 PROCEDURE — 96372 PR INJECTION,THERAP/PROPH/DIAG2ST, IM OR SUBCUT: ICD-10-PCS | Mod: S$GLB,,, | Performed by: INTERNAL MEDICINE

## 2021-12-09 PROCEDURE — 99999 PR PBB SHADOW E&M-EST. PATIENT-LVL III: CPT | Mod: PBBFAC,,, | Performed by: INTERNAL MEDICINE

## 2021-12-09 PROCEDURE — 96372 THER/PROPH/DIAG INJ SC/IM: CPT | Mod: S$GLB,,, | Performed by: INTERNAL MEDICINE

## 2021-12-09 PROCEDURE — 99999 PR PBB SHADOW E&M-EST. PATIENT-LVL III: ICD-10-PCS | Mod: PBBFAC,,, | Performed by: INTERNAL MEDICINE

## 2021-12-09 RX ORDER — MECLIZINE HYDROCHLORIDE 25 MG/1
25 TABLET ORAL 3 TIMES DAILY PRN
Qty: 30 TABLET | Refills: 2 | Status: SHIPPED | OUTPATIENT
Start: 2021-12-09 | End: 2021-12-25 | Stop reason: SDUPTHER

## 2021-12-09 RX ORDER — METHYLPREDNISOLONE ACETATE 40 MG/ML
40 INJECTION, SUSPENSION INTRA-ARTICULAR; INTRALESIONAL; INTRAMUSCULAR; SOFT TISSUE
Status: COMPLETED | OUTPATIENT
Start: 2021-12-09 | End: 2021-12-09

## 2021-12-09 RX ADMIN — METHYLPREDNISOLONE ACETATE 40 MG: 40 INJECTION, SUSPENSION INTRA-ARTICULAR; INTRALESIONAL; INTRAMUSCULAR; SOFT TISSUE at 11:12

## 2021-12-25 DIAGNOSIS — R42 VERTIGO: ICD-10-CM

## 2021-12-25 DIAGNOSIS — R11.0 NAUSEA: ICD-10-CM

## 2021-12-28 RX ORDER — MECLIZINE HYDROCHLORIDE 25 MG/1
25 TABLET ORAL 3 TIMES DAILY PRN
Qty: 30 TABLET | Refills: 2 | OUTPATIENT
Start: 2021-12-28 | End: 2022-05-31

## 2022-01-04 ENCOUNTER — OFFICE VISIT (OUTPATIENT)
Dept: FAMILY MEDICINE | Facility: CLINIC | Age: 42
End: 2022-01-04
Payer: COMMERCIAL

## 2022-01-04 DIAGNOSIS — Z20.822 EXPOSURE TO COVID-19 VIRUS: ICD-10-CM

## 2022-01-04 DIAGNOSIS — J06.9 VIRAL URI WITH COUGH: Primary | ICD-10-CM

## 2022-01-04 DIAGNOSIS — Z20.828 EXPOSURE TO THE FLU: ICD-10-CM

## 2022-01-04 DIAGNOSIS — Z20.818 EXPOSURE TO STREP THROAT: ICD-10-CM

## 2022-01-04 PROCEDURE — 1159F MED LIST DOCD IN RCRD: CPT | Mod: CPTII,95,, | Performed by: FAMILY MEDICINE

## 2022-01-04 PROCEDURE — 99214 PR OFFICE/OUTPT VISIT, EST, LEVL IV, 30-39 MIN: ICD-10-PCS | Mod: 95,,, | Performed by: FAMILY MEDICINE

## 2022-01-04 PROCEDURE — 1160F RVW MEDS BY RX/DR IN RCRD: CPT | Mod: CPTII,95,, | Performed by: FAMILY MEDICINE

## 2022-01-04 PROCEDURE — 1160F PR REVIEW ALL MEDS BY PRESCRIBER/CLIN PHARMACIST DOCUMENTED: ICD-10-PCS | Mod: CPTII,95,, | Performed by: FAMILY MEDICINE

## 2022-01-04 PROCEDURE — 99214 OFFICE O/P EST MOD 30 MIN: CPT | Mod: 95,,, | Performed by: FAMILY MEDICINE

## 2022-01-04 PROCEDURE — 1159F PR MEDICATION LIST DOCUMENTED IN MEDICAL RECORD: ICD-10-PCS | Mod: CPTII,95,, | Performed by: FAMILY MEDICINE

## 2022-01-04 NOTE — PROGRESS NOTES
Crystal Flores is a 41 y.o. female.      Visit type: Virtual visit with synchronous audio and video  The patient is located outside of this physical clinic but within the State of [place], and a thorough physical exam was not performed.  The chief complaint was presented by patient and discussed during visit and is documented below.    100% of visit was face to face counseling, and total visit lasted 20 minutes.     Each patient provided medical services by telemedicine is:  (1) informed of the relationship between the physician and patient and the respective role of any other health care provider with respect to management of the patient; and (2) notified that he or she may decline to receive medical services by telemedicine and may withdraw from such care at any time.    Notes:    Sore Throat   This is a new problem. The current episode started yesterday. The problem has been rapidly worsening. The pain is worse on the left side. The maximum temperature recorded prior to her arrival was 101 - 101.9 F. The fever has been present for less than 1 day. The pain is at a severity of 7/10. The pain is severe. Associated symptoms include congestion, coughing, headaches, a hoarse voice, swollen glands and vomiting. Pertinent negatives include no abdominal pain, diarrhea, drooling, ear discharge, ear pain, neck pain, shortness of breath or trouble swallowing. She has had exposure to strep. She has tried acetaminophen, cool liquids and gargles for the symptoms. The treatment provided moderate relief.        ROS  Review of Systems   Constitutional: Positive for chills and fever. Negative for activity change, appetite change, diaphoresis, fatigue and unexpected weight change.   HENT: Positive for congestion, hoarse voice and sore throat. Negative for dental problem, drooling, ear discharge, ear pain, facial swelling, hearing loss, mouth sores, nosebleeds, postnasal drip, rhinorrhea, sinus pressure, sinus pain, sneezing,  tinnitus, trouble swallowing and voice change.    Eyes: Negative for pain, discharge and visual disturbance.   Respiratory: Positive for cough. Negative for choking and shortness of breath.    Cardiovascular: Negative for chest pain and leg swelling.   Gastrointestinal: Positive for vomiting. Negative for abdominal pain, constipation, diarrhea and nausea.   Genitourinary: Negative for difficulty urinating, dysuria, flank pain, frequency and urgency.   Musculoskeletal: Positive for myalgias. Negative for arthralgias, back pain, gait problem, joint swelling and neck pain.   Skin: Negative.    Allergic/Immunologic: Positive for environmental allergies. Negative for food allergies and immunocompromised state.   Neurological: Positive for headaches. Negative for dizziness, seizures, syncope, weakness and light-headedness.   Psychiatric/Behavioral: Negative.  Negative for confusion, decreased concentration, dysphoric mood and sleep disturbance. The patient is not nervous/anxious.      Assessment & Plan    Discussion of plan of care including treatment options regarding health and wellness were reviewed and discussed with patient.  Any changes to medication or treatment plan, as well as any screening blood test, imaging, or referrals to specialist, are documented.  Follow up as indicated.     1. Viral URI with cough  Screening test will be ordered and once results available patient will be notified of results and managed accordingly.   - POCT rapid strep A; Future  - POCT Influenza A/B; Future  - COVID-19 Routine Screening; Future    2. Exposure to the flu  Rapid test negative.  - POCT Influenza A/B; Future    3. Exposure to COVID-19 virus  Screening test will be ordered and once results available patient will be notified of results and managed accordingly.   - COVID-19 Routine Screening; Future    4. Exposure to strep throat  Rapid test negative.  - POCT rapid strep A; Future       Follow up if symptoms worsen or fail to  improve.        ACTIVE MEDICAL ISSUES:  Documented in Problem List    PAST MEDICAL HISTORY  Documented    PAST SURGICAL HISTORY:  Documented    SOCIAL HISTORY:  Documented    FAMILY HISTORY:  Documented    ALLERGIES AND MEDICATIONS: updated and reviewed.  Documented    Health Maintenance       Date Due Completion Date    TETANUS VACCINE Never done ---    Influenza Vaccine (1) Never done ---    COVID-19 Vaccine (3 - Booster for Pfizer series) 11/24/2021 5/24/2021    Mammogram 07/06/2022 7/6/2021

## 2022-01-05 ENCOUNTER — PATIENT MESSAGE (OUTPATIENT)
Dept: FAMILY MEDICINE | Facility: CLINIC | Age: 42
End: 2022-01-05

## 2022-01-05 ENCOUNTER — LAB VISIT (OUTPATIENT)
Dept: FAMILY MEDICINE | Facility: CLINIC | Age: 42
End: 2022-01-05
Payer: COMMERCIAL

## 2022-01-05 DIAGNOSIS — Z20.828 EXPOSURE TO THE FLU: ICD-10-CM

## 2022-01-05 DIAGNOSIS — J06.9 VIRAL URI WITH COUGH: ICD-10-CM

## 2022-01-05 DIAGNOSIS — Z20.822 EXPOSURE TO COVID-19 VIRUS: ICD-10-CM

## 2022-01-05 DIAGNOSIS — Z20.818 EXPOSURE TO STREP THROAT: ICD-10-CM

## 2022-01-05 LAB
CTP QC/QA: YES
CTP QC/QA: YES
FLUAV AG NPH QL: NEGATIVE
FLUBV AG NPH QL: NEGATIVE
S PYO RRNA THROAT QL PROBE: NEGATIVE

## 2022-01-05 PROCEDURE — 87804 POCT INFLUENZA A/B: ICD-10-PCS | Mod: 59,QW,S$GLB, | Performed by: FAMILY MEDICINE

## 2022-01-05 PROCEDURE — 87880 POCT RAPID STREP A: ICD-10-PCS | Mod: QW,S$GLB,, | Performed by: FAMILY MEDICINE

## 2022-01-05 PROCEDURE — 87804 INFLUENZA ASSAY W/OPTIC: CPT | Mod: QW,S$GLB,, | Performed by: FAMILY MEDICINE

## 2022-01-05 PROCEDURE — U0003 INFECTIOUS AGENT DETECTION BY NUCLEIC ACID (DNA OR RNA); SEVERE ACUTE RESPIRATORY SYNDROME CORONAVIRUS 2 (SARS-COV-2) (CORONAVIRUS DISEASE [COVID-19]), AMPLIFIED PROBE TECHNIQUE, MAKING USE OF HIGH THROUGHPUT TECHNOLOGIES AS DESCRIBED BY CMS-2020-01-R: HCPCS | Performed by: FAMILY MEDICINE

## 2022-01-05 PROCEDURE — 87880 STREP A ASSAY W/OPTIC: CPT | Mod: QW,S$GLB,, | Performed by: FAMILY MEDICINE

## 2022-01-08 LAB
SARS-COV-2 RNA RESP QL NAA+PROBE: DETECTED
SARS-COV-2- CYCLE NUMBER: 15

## 2022-01-25 ENCOUNTER — IMMUNIZATION (OUTPATIENT)
Dept: OBSTETRICS AND GYNECOLOGY | Facility: CLINIC | Age: 42
End: 2022-01-25
Payer: COMMERCIAL

## 2022-01-25 DIAGNOSIS — Z23 NEED FOR VACCINATION: Primary | ICD-10-CM

## 2022-01-25 PROCEDURE — 0004A COVID-19, MRNA, LNP-S, PF, 30 MCG/0.3 ML DOSE VACCINE: CPT | Mod: PBBFAC | Performed by: FAMILY MEDICINE

## 2022-02-01 ENCOUNTER — PATIENT MESSAGE (OUTPATIENT)
Dept: ADMINISTRATIVE | Facility: OTHER | Age: 42
End: 2022-02-01
Payer: COMMERCIAL

## 2022-03-29 ENCOUNTER — TELEPHONE (OUTPATIENT)
Dept: FAMILY MEDICINE | Facility: CLINIC | Age: 42
End: 2022-03-29
Payer: COMMERCIAL

## 2022-03-29 DIAGNOSIS — R51.9 FREQUENT HEADACHES: Primary | ICD-10-CM

## 2022-03-29 RX ORDER — SUMATRIPTAN 50 MG/1
50 TABLET, FILM COATED ORAL
Qty: 9 TABLET | Refills: 0 | Status: SHIPPED | OUTPATIENT
Start: 2022-03-29 | End: 2022-05-26

## 2022-03-29 NOTE — TELEPHONE ENCOUNTER
----- Message from Farzana Villanueva sent at 3/29/2022  2:32 PM CDT -----  Type:  Needs Medical Advice/Symptom-based Call    Who Called: pt    Symptoms (please be specific): migraine    How long has patient had these symptoms:  since yesterday  morning    Would the patient rather a call back or a response via [facility]? call    Best Call Back Number: [phone] (home)     Additional Information:

## 2022-03-29 NOTE — TELEPHONE ENCOUNTER
Patient stated she began with swollen glands earlier this week, which is why she scheduled the appointment.  This morning she woke up with a migraine and has had migraine headaches throughout the week.  Would like to know if Dr. Espinal can prescribe medication to help with the migraines.  Please advise.

## 2022-03-31 ENCOUNTER — LAB VISIT (OUTPATIENT)
Dept: LAB | Facility: HOSPITAL | Age: 42
End: 2022-03-31
Attending: INTERNAL MEDICINE
Payer: COMMERCIAL

## 2022-03-31 ENCOUNTER — OFFICE VISIT (OUTPATIENT)
Dept: FAMILY MEDICINE | Facility: CLINIC | Age: 42
End: 2022-03-31
Payer: COMMERCIAL

## 2022-03-31 VITALS
HEART RATE: 85 BPM | OXYGEN SATURATION: 98 % | DIASTOLIC BLOOD PRESSURE: 80 MMHG | RESPIRATION RATE: 18 BRPM | TEMPERATURE: 98 F | HEIGHT: 58 IN | BODY MASS INDEX: 31.91 KG/M2 | SYSTOLIC BLOOD PRESSURE: 118 MMHG | WEIGHT: 152 LBS

## 2022-03-31 DIAGNOSIS — G43.009 MIGRAINE WITHOUT AURA AND WITHOUT STATUS MIGRAINOSUS, NOT INTRACTABLE: Primary | ICD-10-CM

## 2022-03-31 DIAGNOSIS — G43.009 MIGRAINE WITHOUT AURA AND WITHOUT STATUS MIGRAINOSUS, NOT INTRACTABLE: ICD-10-CM

## 2022-03-31 LAB
ALBUMIN SERPL BCP-MCNC: 4 G/DL (ref 3.5–5.2)
ALP SERPL-CCNC: 45 U/L (ref 55–135)
ALT SERPL W/O P-5'-P-CCNC: 18 U/L (ref 10–44)
ANION GAP SERPL CALC-SCNC: 10 MMOL/L (ref 8–16)
AST SERPL-CCNC: 20 U/L (ref 10–40)
BASOPHILS # BLD AUTO: 0.06 K/UL (ref 0–0.2)
BASOPHILS NFR BLD: 0.8 % (ref 0–1.9)
BILIRUB SERPL-MCNC: 0.4 MG/DL (ref 0.1–1)
BUN SERPL-MCNC: 14 MG/DL (ref 6–20)
CALCIUM SERPL-MCNC: 9.1 MG/DL (ref 8.7–10.5)
CHLORIDE SERPL-SCNC: 106 MMOL/L (ref 95–110)
CO2 SERPL-SCNC: 24 MMOL/L (ref 23–29)
CREAT SERPL-MCNC: 0.8 MG/DL (ref 0.5–1.4)
CRP SERPL-MCNC: 0.4 MG/L (ref 0–8.2)
DIFFERENTIAL METHOD: NORMAL
EOSINOPHIL # BLD AUTO: 0.2 K/UL (ref 0–0.5)
EOSINOPHIL NFR BLD: 2.3 % (ref 0–8)
ERYTHROCYTE [DISTWIDTH] IN BLOOD BY AUTOMATED COUNT: 12.2 % (ref 11.5–14.5)
ERYTHROCYTE [SEDIMENTATION RATE] IN BLOOD BY WESTERGREN METHOD: 5 MM/HR (ref 0–20)
EST. GFR  (AFRICAN AMERICAN): >60 ML/MIN/1.73 M^2
EST. GFR  (NON AFRICAN AMERICAN): >60 ML/MIN/1.73 M^2
GLUCOSE SERPL-MCNC: 96 MG/DL (ref 70–110)
HCT VFR BLD AUTO: 42.6 % (ref 37–48.5)
HGB BLD-MCNC: 13.8 G/DL (ref 12–16)
IMM GRANULOCYTES # BLD AUTO: 0.02 K/UL (ref 0–0.04)
IMM GRANULOCYTES NFR BLD AUTO: 0.3 % (ref 0–0.5)
LYMPHOCYTES # BLD AUTO: 2.2 K/UL (ref 1–4.8)
LYMPHOCYTES NFR BLD: 29.7 % (ref 18–48)
MCH RBC QN AUTO: 30.2 PG (ref 27–31)
MCHC RBC AUTO-ENTMCNC: 32.4 G/DL (ref 32–36)
MCV RBC AUTO: 93 FL (ref 82–98)
MONOCYTES # BLD AUTO: 0.4 K/UL (ref 0.3–1)
MONOCYTES NFR BLD: 5.6 % (ref 4–15)
NEUTROPHILS # BLD AUTO: 4.5 K/UL (ref 1.8–7.7)
NEUTROPHILS NFR BLD: 61.3 % (ref 38–73)
NRBC BLD-RTO: 0 /100 WBC
PLATELET # BLD AUTO: 286 K/UL (ref 150–450)
PMV BLD AUTO: 10.3 FL (ref 9.2–12.9)
POTASSIUM SERPL-SCNC: 3.5 MMOL/L (ref 3.5–5.1)
PROT SERPL-MCNC: 7.3 G/DL (ref 6–8.4)
RBC # BLD AUTO: 4.57 M/UL (ref 4–5.4)
SODIUM SERPL-SCNC: 140 MMOL/L (ref 136–145)
TSH SERPL DL<=0.005 MIU/L-ACNC: 0.92 UIU/ML (ref 0.4–4)
VIT B12 SERPL-MCNC: 658 PG/ML (ref 210–950)
WBC # BLD AUTO: 7.28 K/UL (ref 3.9–12.7)

## 2022-03-31 PROCEDURE — 3074F SYST BP LT 130 MM HG: CPT | Mod: CPTII,S$GLB,, | Performed by: INTERNAL MEDICINE

## 2022-03-31 PROCEDURE — 85025 COMPLETE CBC W/AUTO DIFF WBC: CPT | Performed by: INTERNAL MEDICINE

## 2022-03-31 PROCEDURE — 3008F PR BODY MASS INDEX (BMI) DOCUMENTED: ICD-10-PCS | Mod: CPTII,S$GLB,, | Performed by: INTERNAL MEDICINE

## 2022-03-31 PROCEDURE — 3008F BODY MASS INDEX DOCD: CPT | Mod: CPTII,S$GLB,, | Performed by: INTERNAL MEDICINE

## 2022-03-31 PROCEDURE — 1160F PR REVIEW ALL MEDS BY PRESCRIBER/CLIN PHARMACIST DOCUMENTED: ICD-10-PCS | Mod: CPTII,S$GLB,, | Performed by: INTERNAL MEDICINE

## 2022-03-31 PROCEDURE — 99999 PR PBB SHADOW E&M-EST. PATIENT-LVL IV: ICD-10-PCS | Mod: PBBFAC,,, | Performed by: INTERNAL MEDICINE

## 2022-03-31 PROCEDURE — 86140 C-REACTIVE PROTEIN: CPT | Performed by: INTERNAL MEDICINE

## 2022-03-31 PROCEDURE — 1159F PR MEDICATION LIST DOCUMENTED IN MEDICAL RECORD: ICD-10-PCS | Mod: CPTII,S$GLB,, | Performed by: INTERNAL MEDICINE

## 2022-03-31 PROCEDURE — 99214 PR OFFICE/OUTPT VISIT, EST, LEVL IV, 30-39 MIN: ICD-10-PCS | Mod: S$GLB,,, | Performed by: INTERNAL MEDICINE

## 2022-03-31 PROCEDURE — 3079F DIAST BP 80-89 MM HG: CPT | Mod: CPTII,S$GLB,, | Performed by: INTERNAL MEDICINE

## 2022-03-31 PROCEDURE — 1159F MED LIST DOCD IN RCRD: CPT | Mod: CPTII,S$GLB,, | Performed by: INTERNAL MEDICINE

## 2022-03-31 PROCEDURE — 99214 OFFICE O/P EST MOD 30 MIN: CPT | Mod: S$GLB,,, | Performed by: INTERNAL MEDICINE

## 2022-03-31 PROCEDURE — 3074F PR MOST RECENT SYSTOLIC BLOOD PRESSURE < 130 MM HG: ICD-10-PCS | Mod: CPTII,S$GLB,, | Performed by: INTERNAL MEDICINE

## 2022-03-31 PROCEDURE — 99999 PR PBB SHADOW E&M-EST. PATIENT-LVL IV: CPT | Mod: PBBFAC,,, | Performed by: INTERNAL MEDICINE

## 2022-03-31 PROCEDURE — 1160F RVW MEDS BY RX/DR IN RCRD: CPT | Mod: CPTII,S$GLB,, | Performed by: INTERNAL MEDICINE

## 2022-03-31 PROCEDURE — 85652 RBC SED RATE AUTOMATED: CPT | Performed by: INTERNAL MEDICINE

## 2022-03-31 PROCEDURE — 84443 ASSAY THYROID STIM HORMONE: CPT | Performed by: INTERNAL MEDICINE

## 2022-03-31 PROCEDURE — 36415 COLL VENOUS BLD VENIPUNCTURE: CPT | Mod: PO | Performed by: INTERNAL MEDICINE

## 2022-03-31 PROCEDURE — 80053 COMPREHEN METABOLIC PANEL: CPT | Performed by: INTERNAL MEDICINE

## 2022-03-31 PROCEDURE — 82607 VITAMIN B-12: CPT | Performed by: INTERNAL MEDICINE

## 2022-03-31 PROCEDURE — 3079F PR MOST RECENT DIASTOLIC BLOOD PRESSURE 80-89 MM HG: ICD-10-PCS | Mod: CPTII,S$GLB,, | Performed by: INTERNAL MEDICINE

## 2022-03-31 RX ORDER — TOPIRAMATE 25 MG/1
25 TABLET ORAL DAILY
Qty: 30 TABLET | Refills: 0 | Status: SHIPPED | OUTPATIENT
Start: 2022-03-31 | End: 2022-04-06

## 2022-03-31 NOTE — PROGRESS NOTES
SUBJECTIVE     Chief Complaint   Patient presents with    Headache       HPI  Crystal Greer is a 42 y.o. female with multiple medical diagnoses as listed in the medical history and problem list that presents for evaluation of a migraine x 1-2 months. Pt reports having a swollen gland and sore throat last week. She then developed a headache. Her worst headache was this past Monday. It was associated with blurred vision and lock jaw. Pt has had a MRI/MRA in the past per Neurology for a hx of migraines. The pain is in her neck and radiates to the parietal lobes. Her pain is throbbing at a 10-11/10 on Monday. Her pain is now down to a 5/10. Pt has been taking Imitrex, but it puts her to sleep.  Pt now has headaches daily and migraines more frequently throughout the week.     PAST MEDICAL HISTORY:  History reviewed. No pertinent past medical history.    PAST SURGICAL HISTORY:  Past Surgical History:   Procedure Laterality Date     SECTION      HYSTERECTOMY      TOTAL REDUCTION MAMMOPLASTY      TUBAL LIGATION         SOCIAL HISTORY:  Social History     Socioeconomic History    Marital status: Single   Tobacco Use    Smoking status: Never Smoker    Smokeless tobacco: Never Used   Substance and Sexual Activity    Alcohol use: Yes    Drug use: Never    Sexual activity: Yes     Partners: Male       FAMILY HISTORY:  Family History   Problem Relation Age of Onset    Diabetes Maternal Grandmother     Stroke Maternal Grandmother     No Known Problems Mother     No Known Problems Father        ALLERGIES AND MEDICATIONS: updated and reviewed.  Review of patient's allergies indicates:   Allergen Reactions    Codeine Hives     Current Outpatient Medications   Medication Sig Dispense Refill    buPROPion (WELLBUTRIN XL) 150 MG TB24 tablet TAKE 1 TABLET BY MOUTH DAILY 90 tablet 1    cetirizine (ZYRTEC) 10 MG tablet Take 1 tablet (10 mg total) by mouth once daily. 30 tablet 2    rosuvastatin (CRESTOR)  "40 MG Tab Take 1 tablet (40 mg total) by mouth every evening. 90 tablet 3    sumatriptan (IMITREX) 50 MG tablet Take 1 tablet (50 mg total) by mouth every 2 (two) hours as needed for Migraine (Do not exceed 200 mg per 24 hours). 9 tablet 0    meclizine (ANTIVERT) 25 mg tablet Take 1 tablet (25 mg total) by mouth 3 (three) times daily as needed for Dizziness or Nausea. (Patient not taking: Reported on 3/31/2022) 30 tablet 2    topiramate (TOPAMAX) 25 MG tablet Take 1 tablet (25 mg total) by mouth once daily. 30 tablet 0     No current facility-administered medications for this visit.       ROS  Review of Systems   Constitutional: Positive for activity change. Negative for unexpected weight change.   HENT: Negative for hearing loss, rhinorrhea and trouble swallowing.    Eyes: Positive for visual disturbance. Negative for discharge.   Respiratory: Negative for chest tightness and wheezing.    Cardiovascular: Negative for chest pain and palpitations.   Gastrointestinal: Positive for vomiting. Negative for blood in stool, constipation and diarrhea.   Endocrine: Positive for polydipsia and polyuria.   Genitourinary: Negative for difficulty urinating, dysuria, hematuria and menstrual problem.   Musculoskeletal: Positive for neck pain. Negative for arthralgias and joint swelling.   Skin: Negative for rash and wound.   Neurological: Positive for weakness and headaches.   Psychiatric/Behavioral: Negative for confusion and dysphoric mood.         OBJECTIVE     Physical Exam  Vitals:    03/31/22 0825   BP: 118/80   Pulse: 85   Resp: 18   Temp: 98.3 °F (36.8 °C)    Body mass index is 31.77 kg/m².  Weight: 68.9 kg (152 lb)   Height: 4' 10" (147.3 cm)     Physical Exam  Constitutional:       General: She is not in acute distress.     Appearance: She is well-developed.   HENT:      Head: Normocephalic and atraumatic.      Right Ear: External ear normal.      Left Ear: External ear normal.      Nose: Nose normal.   Eyes:      " General: No scleral icterus.        Right eye: No discharge.         Left eye: No discharge.      Conjunctiva/sclera: Conjunctivae normal.      Pupils: Pupils are equal, round, and reactive to light.   Neck:      Vascular: No JVD.      Trachea: No tracheal deviation.   Cardiovascular:      Rate and Rhythm: Normal rate and regular rhythm.      Heart sounds: No murmur heard.    No friction rub. No gallop.   Pulmonary:      Effort: Pulmonary effort is normal. No respiratory distress.      Breath sounds: Normal breath sounds. No wheezing.   Abdominal:      General: Bowel sounds are normal. There is no distension.      Palpations: Abdomen is soft. There is no mass.      Tenderness: There is no abdominal tenderness. There is no guarding or rebound.   Musculoskeletal:         General: No tenderness or deformity. Normal range of motion.      Cervical back: Normal range of motion and neck supple.   Lymphadenopathy:      Head:      Right side of head: Submental and submandibular adenopathy present.   Skin:     General: Skin is warm and dry.      Findings: No erythema or rash.   Neurological:      Mental Status: She is alert and oriented to person, place, and time.      Motor: No abnormal muscle tone.      Coordination: Coordination normal.   Psychiatric:         Behavior: Behavior normal.         Thought Content: Thought content normal.         Judgment: Judgment normal.           Health Maintenance       Date Due Completion Date    TETANUS VACCINE Never done ---    Influenza Vaccine (1) Never done ---    Mammogram 07/06/2022 7/6/2021            ASSESSMENT     42 y.o. female with     1. Migraine without aura and without status migrainosus, not intractable        PLAN:     1. Migraine without aura and without status migrainosus, not intractable  - start trial Topamax and have patient continue Imitrex p.r.n.  - topiramate (TOPAMAX) 25 MG tablet; Take 1 tablet (25 mg total) by mouth once daily.  Dispense: 30 tablet; Refill: 0  -  Ambulatory referral/consult to Neurology; Future  - CBC Auto Differential; Future  - Comprehensive Metabolic Panel; Future  - Vitamin B12; Future  - TSH; Future  - C-Reactive Protein; Future  - Sedimentation rate; Future        RTC in 1-2 weeks as needed for any acute worsening of current condition or failure to improve       Isha Espinal MD  03/31/2022 8:47 AM        No follow-ups on file.

## 2022-04-06 ENCOUNTER — PATIENT MESSAGE (OUTPATIENT)
Dept: FAMILY MEDICINE | Facility: CLINIC | Age: 42
End: 2022-04-06
Payer: COMMERCIAL

## 2022-04-06 DIAGNOSIS — G43.009 MIGRAINE WITHOUT AURA AND WITHOUT STATUS MIGRAINOSUS, NOT INTRACTABLE: ICD-10-CM

## 2022-04-06 RX ORDER — TOPIRAMATE 25 MG/1
50 TABLET ORAL DAILY
Qty: 30 TABLET | Refills: 0
Start: 2022-04-06 | End: 2022-05-26

## 2022-04-12 ENCOUNTER — PATIENT OUTREACH (OUTPATIENT)
Dept: ADMINISTRATIVE | Facility: OTHER | Age: 42
End: 2022-04-12
Payer: COMMERCIAL

## 2022-04-12 NOTE — PROGRESS NOTES
Health Maintenance Due   Topic Date Due    TETANUS VACCINE  Never done    Influenza Vaccine (1) Never done     Updates were requested from care everywhere.  Chart was reviewed for overdue Proactive [facility] Encounters ([facility]) topics (CRS, Breast Cancer Screening, Eye exam)  Health Maintenance has been updated.  LINKS immunization registry triggered.  Immunizations were reconciled.

## 2022-04-13 ENCOUNTER — OFFICE VISIT (OUTPATIENT)
Dept: NEUROLOGY | Facility: CLINIC | Age: 42
End: 2022-04-13
Payer: COMMERCIAL

## 2022-04-13 VITALS
HEART RATE: 87 BPM | HEIGHT: 58 IN | TEMPERATURE: 98 F | BODY MASS INDEX: 31.66 KG/M2 | DIASTOLIC BLOOD PRESSURE: 98 MMHG | WEIGHT: 150.81 LBS | SYSTOLIC BLOOD PRESSURE: 136 MMHG | RESPIRATION RATE: 18 BRPM

## 2022-04-13 DIAGNOSIS — G44.40 MEDICATION OVERUSE HEADACHE: ICD-10-CM

## 2022-04-13 DIAGNOSIS — R51.9 COVID-19 LONG HAULER MANIFESTING CHRONIC HEADACHE: ICD-10-CM

## 2022-04-13 DIAGNOSIS — Z82.49 FAMILY HISTORY OF INTRACRANIAL ANEURYSMS: ICD-10-CM

## 2022-04-13 DIAGNOSIS — U09.9 COVID-19 LONG HAULER MANIFESTING CHRONIC HEADACHE: ICD-10-CM

## 2022-04-13 DIAGNOSIS — F32.A DEPRESSION, UNSPECIFIED DEPRESSION TYPE: ICD-10-CM

## 2022-04-13 DIAGNOSIS — G89.29 COVID-19 LONG HAULER MANIFESTING CHRONIC HEADACHE: ICD-10-CM

## 2022-04-13 DIAGNOSIS — G43.719 INTRACTABLE CHRONIC MIGRAINE WITHOUT AURA AND WITHOUT STATUS MIGRAINOSUS: Primary | ICD-10-CM

## 2022-04-13 DIAGNOSIS — R51.9 WORSENING HEADACHES: ICD-10-CM

## 2022-04-13 DIAGNOSIS — E78.2 MIXED HYPERLIPIDEMIA: ICD-10-CM

## 2022-04-13 PROCEDURE — 1159F MED LIST DOCD IN RCRD: CPT | Mod: CPTII,S$GLB,, | Performed by: PSYCHIATRY & NEUROLOGY

## 2022-04-13 PROCEDURE — 3080F PR MOST RECENT DIASTOLIC BLOOD PRESSURE >= 90 MM HG: ICD-10-PCS | Mod: CPTII,S$GLB,, | Performed by: PSYCHIATRY & NEUROLOGY

## 2022-04-13 PROCEDURE — 3075F PR MOST RECENT SYSTOLIC BLOOD PRESS GE 130-139MM HG: ICD-10-PCS | Mod: CPTII,S$GLB,, | Performed by: PSYCHIATRY & NEUROLOGY

## 2022-04-13 PROCEDURE — 3008F PR BODY MASS INDEX (BMI) DOCUMENTED: ICD-10-PCS | Mod: CPTII,S$GLB,, | Performed by: PSYCHIATRY & NEUROLOGY

## 2022-04-13 PROCEDURE — 1160F PR REVIEW ALL MEDS BY PRESCRIBER/CLIN PHARMACIST DOCUMENTED: ICD-10-PCS | Mod: CPTII,S$GLB,, | Performed by: PSYCHIATRY & NEUROLOGY

## 2022-04-13 PROCEDURE — 1159F PR MEDICATION LIST DOCUMENTED IN MEDICAL RECORD: ICD-10-PCS | Mod: CPTII,S$GLB,, | Performed by: PSYCHIATRY & NEUROLOGY

## 2022-04-13 PROCEDURE — 99205 OFFICE O/P NEW HI 60 MIN: CPT | Mod: S$GLB,,, | Performed by: PSYCHIATRY & NEUROLOGY

## 2022-04-13 PROCEDURE — 3080F DIAST BP >= 90 MM HG: CPT | Mod: CPTII,S$GLB,, | Performed by: PSYCHIATRY & NEUROLOGY

## 2022-04-13 PROCEDURE — 3008F BODY MASS INDEX DOCD: CPT | Mod: CPTII,S$GLB,, | Performed by: PSYCHIATRY & NEUROLOGY

## 2022-04-13 PROCEDURE — 3075F SYST BP GE 130 - 139MM HG: CPT | Mod: CPTII,S$GLB,, | Performed by: PSYCHIATRY & NEUROLOGY

## 2022-04-13 PROCEDURE — 99999 PR PBB SHADOW E&M-EST. PATIENT-LVL IV: CPT | Mod: PBBFAC,,, | Performed by: PSYCHIATRY & NEUROLOGY

## 2022-04-13 PROCEDURE — 99205 PR OFFICE/OUTPT VISIT, NEW, LEVL V, 60-74 MIN: ICD-10-PCS | Mod: S$GLB,,, | Performed by: PSYCHIATRY & NEUROLOGY

## 2022-04-13 PROCEDURE — 1160F RVW MEDS BY RX/DR IN RCRD: CPT | Mod: CPTII,S$GLB,, | Performed by: PSYCHIATRY & NEUROLOGY

## 2022-04-13 PROCEDURE — 99999 PR PBB SHADOW E&M-EST. PATIENT-LVL IV: ICD-10-PCS | Mod: PBBFAC,,, | Performed by: PSYCHIATRY & NEUROLOGY

## 2022-04-13 RX ORDER — GABAPENTIN 300 MG/1
CAPSULE ORAL
Qty: 60 CAPSULE | Refills: 6 | Status: SHIPPED | OUTPATIENT
Start: 2022-04-13 | End: 2023-12-22

## 2022-04-13 RX ORDER — TOPIRAMATE 50 MG/1
CAPSULE, EXTENDED RELEASE ORAL
Qty: 60 CAPSULE | Refills: 11 | Status: SHIPPED | OUTPATIENT
Start: 2022-04-13 | End: 2023-05-23 | Stop reason: SDUPTHER

## 2022-04-13 RX ORDER — BUTALBITAL, ACETAMINOPHEN AND CAFFEINE 50; 325; 40 MG/1; MG/1; MG/1
TABLET ORAL
Qty: 14 TABLET | Refills: 0 | Status: SHIPPED | OUTPATIENT
Start: 2022-04-13

## 2022-04-13 RX ORDER — NARATRIPTAN 2.5 MG/1
TABLET ORAL
Qty: 12 TABLET | Refills: 6 | Status: SHIPPED | OUTPATIENT
Start: 2022-04-13 | End: 2022-09-01

## 2022-04-13 NOTE — PROGRESS NOTES
"[facility]    Chief complaint: migraine   Referred by: Isha Espinal MD  [id number]  [address]  [place] [id number]     History of Present Illness:    42Y RH F with HL, depression, and headache who presents for initial evaluation of headache. She was seen on 3/31/22 by PCP and was started on prevention with topiramate 25mg nightly and has sumatriptan 50mg PRN for acute attacks.  She had labs drawn which showed normal CBC, CMP, Vitamin b12, TSH, ESR and CRP. She is currently on statin for cholesterol elevated and wellbutrin for depression.       Headache history  Age at onset and course over time: she has had headaches her entire life since age 8-9 years old having migraine, where she had to go into the dark room and also as a teenager had them, about 10-12 years ago she went to neurology still having migraine, she had them on/off during the years, she mentions that since COVID January 2022 she had high fevers, chills, fatigue, muscle pain, aching, "really bad migraine during COVID". She mentions that prior to COVID she had headache level 4 days a week and would have to take Excedrin migraine to resolve it otherwise it's become severe debilitating. She would have about 3 days a week without headache. She had that frequency for years or so. She mentiosn that over those 10 years she was good, but she would have every so often that did not resolve with excedrin. After COVID she has had the headache now all the time. As soon as she wake sup has to treat with excedrin otherwise by 10am it's severe. She mentiosn that excedrin brings it down. She has had now 2 weeks since last Monday that she has had a daily headache. She mentions that about 2 weeks she had severe attack with difficulty opening her mouth, locked jaw, very blurry vision, she also had some blurry vision binocular diplopia which lasted a few hours. She had severe migraine that day. She had to take something. She " "had some slurred speech. She called her PCP and was seen 2 days after that and started on TPX 50mg nightly and has not seen improvement.     She mentions that her migraine attacks have worsened significantly since COVID infection. since 2022  She takes excedrin every single day 2 times a day.   Location: occipital area into the forehead  Character: pressure, throbbing/pounding , dull burning pain, neck area (had neck pain prior to covid infection)  Intensity: 1-10/10, currently 6/10   Frequency: daily, 5 days a week severe   Duration: constant, but escalations last days   Aura:  None   Associated symptoms: photophobia, phonophobia, osmophobia, kinesiophobia, neck tightness/pain, nausea/vomiting  Other neurologic symptoms: dizziness (lightheaded, feels disoriented at work since TPX), vertigo, blurry vision, double vision (for a few hours 2 weeks ago), mood changes, problems with concentration/memory/task completion/relxation, neck pain, nasal congestions and sinus pressure  Precipitating factors: sleep deprivation,  missed meal, exercise, foods, alcohol, weather changes, stress  Alleviating factors: sleep, darkness, ice, massage, medications  Aggravating factors: exposure to light, sound, smells, coughing/bending over, foods (strong scented foods like onions and eating it will aggravate), stress  Reports "spotty vision when bending over" during a migraine only  Reports pulsatile tinnitus   Family history of headache:  MGF passed from aneurysm and  from SAH. MGM stroke , her own daughter has migraine   ER/UC visits: none   Caffeine: 1 cup/day   Sleep: poor, unrefreshed sleep   GYN: s/p hysterectomy     Medication history:  Acute:  Excedrin 2 times a day everyday - since - prior to that about 16 days a month for years.   Sumatriptan 50 mg - took twice had a "weird reaction to it" felt like head is burning and does not like it. It made her very sleepy, less intensity   Ibuprofen  apap  Asa "   Ketorolac  Naproxen     Prevention:  topiramate 25mg on 3/31/22 prescribed , was increased to 50mg 1 week ago or so - some benefit, headche overnight improved, she can sleep better, feeling cognitively impaired, feeling foggy, she is lightheaded, and also having paresthesias.     wellbutrin- for depression, no benefit in headache    MIDAS: 43    Neuroimaging and other studies:   No results found for this or any previous visit.      ROS: The fourteen point review of system was performed.   Constitutional:  +change in appetite, fatigue  Eyes:                        +blurry vision, had a few hours of binocular horizontal diplopia with severe headache   ENT:   Denies hearing loss, infection, carcinoma or other diseases excepting any listed above.   Cardiovascular:  Denies stroke, CAD, arrhythmia, CHF or other disease excepting any listed above.  Pulmonary:  Denies dyspnea, COPD, RAD or infection or neoplasm excepting any listed above.  Gastrointestinal: Denies ulcer disease, liver or other disease excepting any listed above.  Skin:   Denies rash, skin cancer, or other cutaneous disorder excepting any listed above.  Neurological:  See HPI  Musculoskeletal: +muscle pain, joint pain  Heme/Lymphatic: Denies any blood or lymph system neoplasm or disorder excepting any listed above.  Endocrine:  Denies any thyroid or other disorders, excepting any listed above.  Allergic/Immuno: Denies any autoimmune disease or allergy excepting any listed above.  Psychiatric:  Denies any disorder or treatment excepting any listed above.  Urologic:  Denies any difficulties with the urinary system or sexual function except noted above.    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   Social History     Tobacco Use    Smoking status: Never Smoker    Smokeless tobacco: Never Used   Substance Use Topics    Alcohol use: Yes    Drug use: Never         Review of patient's allergies indicates:   Allergen Reactions    Codeine Hives         Current Outpatient Medications:     buPROPion (WELLBUTRIN XL) 150 MG TB24 tablet, TAKE 1 TABLET BY MOUTH DAILY, Disp: 90 tablet, Rfl: 1    cetirizine (ZYRTEC) 10 MG tablet, Take 1 tablet (10 mg total) by mouth once daily., Disp: 30 tablet, Rfl: 2    meclizine (ANTIVERT) 25 mg tablet, Take 1 tablet (25 mg total) by mouth 3 (three) times daily as needed for Dizziness or Nausea., Disp: 30 tablet, Rfl: 2    rosuvastatin (CRESTOR) 40 MG Tab, Take 1 tablet (40 mg total) by mouth every evening., Disp: 90 tablet, Rfl: 3    sumatriptan (IMITREX) 50 MG tablet, Take 1 tablet (50 mg total) by mouth every 2 (two) hours as needed for Migraine (Do not exceed 200 mg per 24 hours)., Disp: 9 tablet, Rfl: 0    topiramate (TOPAMAX) 25 MG tablet, Take 2 tablets (50 mg total) by mouth once daily., Disp: 30 tablet, Rfl: 0      PHYSICAL EXAMINATION  Vitals:    04/13/22 1303   BP: (!) 136/98   Pulse: 87   Resp: 18   Temp: 98 °F (36.7 °C)     General: The patient is a well-developed, well-nourished looking of stated age in no acute distress.  Head: Normocephalic, atraumatic  Eyes, ears, nose and throat: normal.  Neck: Supple  Cardiovascular:  No carotid bruits.  Regular rate and rhythm.   No pericranial tenderness  Full ROM in neck   NEUROLOGIC EXAM:  MENTAL: The patient is awake, alert and oriented times to time, place, location and situation. Intact recent memory, attention, concentration. Language and speech are normal. No aphasia, no dysarthria  CRANIAL NERVES:   CN II: visual fields  are intact to confrontation with no defects;  funduscopic examination is within normal limits without evidence of papilledema and signs of venous pulsations.  Pupils are equal, round and reactive to light and accommodation.    III, IV and VI: extraocular movements are intact to all directions of gaze with normal convergence.  V: facial sensation is intact to light touch in divisions V1 through V3.    VII: Facial muscle strength is intact to eyebrow raising, forceful eyelid closure, and cheek puffing.    VIII: Hearing acuity is intact to finger rub.  IX, X: palate rises symmetrically and uvula midline.    XI: Sternocleidomastoid and trapezius strength are 5/5 bilaterally.    XII: Tongue protrudes midline without atrophy or fasciculations.   MOTOR EXAM: No atrophy or fasciculations are present. No pronator drift,  shows normal strength ( 5/5 strength )in all 4 extremities. Tone is normal. SENSORY EXAM: intact pinprick, light touch, vibration, and position bilaterally.  CEREBELLAR EXAM: shows normal finger-to-nose and heel-to-shin.   DEEP TENDON REFLEXES:  +2 in brachioradialis, biceps, triceps, knee jerks, ankle jerks, downgoing toes b/l. No abnormal reflexes are present.  GAIT/STANCE: Romberg Negative.  Standard gait was normal with normal stride, arm swing and turning.  Normal heel and toe walking and tandem gait.       Impression:  Chronic migraine without aura - she has had lifelong migraine headache, at least 10-12 years of chronic migraine with using Excedrin over 15 days a month, but after COVID 19 infection on 1/22 (breakthrough infection) she quickly had severe migraine during infection, despite recovering from other symptoms her headaches continue and have become daily constant with increase in the use of Excedrin with minimal relief. She had labs which did not reveal any abnormalities including cbc, cmp, tsh, vitamin b12, crp, esr. She has not had any neuroimaging since this significant worsening. She was  started about 2 days ago on TPX now up to 50mg, which is helping some, but she is having significant side effects from it with paresthesias, cognitive side effects, in the past she had been on it with good effect, neurological examination today did not reveal any deficits or concerns. At this point we will do a cycle breaker, have her d/c excedrin, we will transition from IR topiramate to Trokendi (less side effects) and trial her on naratriptan or nurtec. She did not tolerate sumatriptan po.  In today's clinic visit we provided patient with education on their headache diagnosis, pathophysiology, triggers, aggravating and improving factors, risk factors for chronification of migraine, preventive management, non-pharmacological management and proper use of acute management medications.   Patient expressed understanding and all questions were answered.we will get neuroimaging to evaluate for other secondary causes of the significant worsening. Will get MRA head due to intracranial aneurysm family history and the episode of diplopia, slurred speech she had about 2 weeks ago.       Covid 19 infection long hauler with chronic daily headache - over 3 months of this chronic daily headache and COVID 19 infection    Medication overuse headache from excedrin - discussed moh at length, she will stop this.       Comorbidities:  Depression- well controlled  HL- well controlled   Obesity - she is working on healthier eating, trokendi might help with weight as well.   Family history of intracranial aneurysm - in MGM  from aSAH.   Plan:   1- For preventive management: A. Transition from topiramate 50mg nightly  To Trokendi XR 50mg nightly  x 1 week, then 100 mg nightly and continue this dose. Once doing this and if tolerating well can start gabapentin          B. Start gabapentin 300mg nightly x 1 week, then if tolerating well 600mg nightly          C. If unable to tolerate topiramate, trokendi or gabapentin, then next step is  Botox vs. CGRP Mab for chronic migraine prevention.       2- Acute management: Trial of naratriptan 2.5mg at onse tof escalation of attack, can repeat after 4 hours. Max is 2/day, no more than 2-3 days a week or 10 days a month.     Or     Trial of Nurtec 75mg at onset of escalation. Max 1/day. This can be taken as often as needed up to every other days as it does not cause medication overuse headache.     3- MRI brain w/wo contrast and MRA head     4- Keep headache diary       RTC in 8 weeks.         Evi Dunn MD   Board Certified Neurologist  [facility] Certified Headache Medicine     I spent  60  minutes on day of this encounter preparing, treating, and managing this patient with chronic migraine,covid 19 long hauler chronic daily headache, moh, depression, HL, obesity.

## 2022-04-13 NOTE — PATIENT INSTRUCTIONS
1- For preventive management: A. Transition from topiramate 50mg nightly  To Trokendi XR 50mg nightly  x 1 week, then 100 mg nightly and continue this dose. Once doing this and if tolerating well can start gabapentin          B. Start gabapentin 300mg nightly x 1 week, then if tolerating well 600mg nightly          C. If unable to tolerate topiramate, trokendi or gabapentin, then next step is Botox vs. CGRP Mab for chronic migraine prevention.       2- Acute management: Trial of naratriptan 2.5mg at onse tof escalation of attack, can repeat after 4 hours. Max is 2/day, no more than 2-3 days a week or 10 days a month.     Or     Trial of Nurtec 75mg at onset of escalation. Max 1/day. This can be taken as often as needed up to every other days as it does not cause medication overuse headache.     3- MRI brain w/wo contrast and MRA head     4- Keep headache diary     5- Stop excedrin and sumatriptan     6- Fioricet taper for the worsenig headache post covid infection:  1 tablet 1 tab po three times/day x 3 days,t hen 1 tab twice a day x 2 days, then 1 tab in am x 1 day, then stop.       More information : [employer]anmigraine[employer]ation.org

## 2022-04-14 ENCOUNTER — PATIENT MESSAGE (OUTPATIENT)
Dept: NEUROLOGY | Facility: CLINIC | Age: 42
End: 2022-04-14
Payer: COMMERCIAL

## 2022-04-14 ENCOUNTER — TELEPHONE (OUTPATIENT)
Dept: PHARMACY | Facility: CLINIC | Age: 42
End: 2022-04-14
Payer: COMMERCIAL

## 2022-04-28 ENCOUNTER — HOSPITAL ENCOUNTER (OUTPATIENT)
Dept: RADIOLOGY | Facility: HOSPITAL | Age: 42
Discharge: HOME | End: 2022-04-28
Attending: PSYCHIATRY & NEUROLOGY
Payer: COMMERCIAL

## 2022-04-28 DIAGNOSIS — G43.719 INTRACTABLE CHRONIC MIGRAINE WITHOUT AURA AND WITHOUT STATUS MIGRAINOSUS: ICD-10-CM

## 2022-04-28 DIAGNOSIS — Z82.49 FAMILY HISTORY OF INTRACRANIAL ANEURYSMS: ICD-10-CM

## 2022-04-28 DIAGNOSIS — R51.9 WORSENING HEADACHES: ICD-10-CM

## 2022-04-28 PROCEDURE — 25500020 PHARM REV CODE 255: Performed by: PSYCHIATRY & NEUROLOGY

## 2022-04-28 PROCEDURE — 70544 MRA BRAIN WITHOUT CONTRAST: ICD-10-PCS | Mod: 26,59,GC, | Performed by: RADIOLOGY

## 2022-04-28 PROCEDURE — 70553 MRI BRAIN STEM W/O & W/DYE: CPT | Mod: 26,,, | Performed by: RADIOLOGY

## 2022-04-28 PROCEDURE — 70544 MR ANGIOGRAPHY HEAD W/O DYE: CPT | Mod: TC,59

## 2022-04-28 PROCEDURE — 70553 MRI BRAIN W WO CONTRAST: ICD-10-PCS | Mod: 26,,, | Performed by: RADIOLOGY

## 2022-04-28 PROCEDURE — 70553 MRI BRAIN STEM W/O & W/DYE: CPT | Mod: TC

## 2022-04-28 PROCEDURE — 70544 MR ANGIOGRAPHY HEAD W/O DYE: CPT | Mod: 26,59,GC, | Performed by: RADIOLOGY

## 2022-04-28 PROCEDURE — A9585 GADOBUTROL INJECTION: HCPCS | Performed by: PSYCHIATRY & NEUROLOGY

## 2022-04-28 RX ORDER — GADOBUTROL 604.72 MG/ML
7 INJECTION INTRAVENOUS
Status: COMPLETED | OUTPATIENT
Start: 2022-04-28 | End: 2022-04-28

## 2022-04-28 RX ADMIN — GADOBUTROL 7 ML: 604.72 INJECTION INTRAVENOUS at 07:04

## 2022-05-04 ENCOUNTER — PATIENT MESSAGE (OUTPATIENT)
Dept: NEUROLOGY | Facility: CLINIC | Age: 42
End: 2022-05-04
Payer: COMMERCIAL

## 2022-05-07 DIAGNOSIS — R51.9 FREQUENT HEADACHES: ICD-10-CM

## 2022-05-07 NOTE — TELEPHONE ENCOUNTER
Care Due:                  Date            Visit Type   Department     Provider  --------------------------------------------------------------------------------                                [facility]      [facility]/OF  MEDICINE/INTERN  Last Visit: 03-      [facility]   AL MED         Isha Espinal  Next Visit: None Scheduled  None         None Found                                                            Last  Test          Frequency    Reason                     Performed    Due Date  --------------------------------------------------------------------------------    Lipid Panel.  12 months..  rosuvastatin.............  06- 06-    [employer] Embedded Care Gaps. Reference number: [id number]. 5/07/2022   10:16:21 AM CDT

## 2022-05-09 RX ORDER — SUMATRIPTAN 50 MG/1
50 TABLET, FILM COATED ORAL
Qty: 9 TABLET | Refills: 2 | OUTPATIENT
Start: 2022-05-09

## 2022-05-09 NOTE — TELEPHONE ENCOUNTER
Last Office Visit Info:   The patient's last visit with Isha Espinal MD was on 3/31/2022.    The patient's last visit in current department was on 3/31/2022.        Last CBC Results:   Lab Results   Component Value Date    WBC 7.28 03/31/2022    HGB 13.8 03/31/2022    HCT 42.6 03/31/2022     03/31/2022       Last CMP Results  Lab Results   Component Value Date     03/31/2022    K 3.5 03/31/2022     03/31/2022    CO2 24 03/31/2022    BUN 14 03/31/2022    CREATININE 0.8 03/31/2022    CALCIUM 9.1 03/31/2022    ALBUMIN 4.0 03/31/2022    AST 20 03/31/2022    ALT 18 03/31/2022       Last Lipids  Lab Results   Component Value Date    CHOL 217 (H) 06/14/2021    TRIG 251 (H) 06/14/2021    HDL 45 06/14/2021    LDLCALC 121.8 06/14/2021       Last A1C  Lab Results   Component Value Date    HGBA1C 5.0 06/14/2021       Last TSH  Lab Results   Component Value Date    TSH 0.915 03/31/2022             Current Med Refills  Medication List with Changes/Refills   Current Medications    BUPROPION (WELLBUTRIN XL) 150 MG TB24 TABLET    TAKE 1 TABLET BY MOUTH DAILY       Start Date: 10/24/2021End Date: --    BUTALBITAL-ACETAMINOPHEN-CAFFEINE -40 MG (FIORICET, ESGIC) -40 MG PER TABLET    1 tab po TID x 3 days,t hen 1 tab po bid x 2 days, then 1 tab daily x 1 day.       Start Date: 4/13/2022 End Date: --    CETIRIZINE (ZYRTEC) 10 MG TABLET    Take 1 tablet (10 mg total) by mouth once daily.       Start Date: 10/25/2021End Date: 10/25/2022    GABAPENTIN (NEURONTIN) 300 MG CAPSULE    1 capsule po qhs x 1 week, then 2 capsules po qhs       Start Date: 4/13/2022 End Date: --    MECLIZINE (ANTIVERT) 25 MG TABLET    Take 1 tablet (25 mg total) by mouth 3 (three) times daily as needed for Dizziness or Nausea.       Start Date: 12/28/2021End Date: --    NARATRIPTAN (AMERGE) 2.5 MG TABLET    2.5 mg at onset of headache, may repeat in 4 hours if needed. Max 2/day.       Start Date: 4/13/2022 End Date: --     ROSUVASTATIN (CRESTOR) 40 MG TAB    Take 1 tablet (40 mg total) by mouth every evening.       Start Date: 10/25/2021End Date: 10/25/2022    SUMATRIPTAN (IMITREX) 50 MG TABLET    Take 1 tablet (50 mg total) by mouth every 2 (two) hours as needed for Migraine (Do not exceed 200 mg per 24 hours).       Start Date: 3/29/2022 End Date: --    TOPIRAMATE (TOPAMAX) 25 MG TABLET    Take 2 tablets (50 mg total) by mouth once daily.       Start Date: 4/6/2022  End Date: 4/6/2023    TOPIRAMATE (TROKENDI XR) 50 MG CP24    Take 1 capsule by mouth daily x 1 week, then take 2 capsules by mouth daily and continue this dose.       Start Date: 4/13/2022 End Date: --

## 2022-05-26 ENCOUNTER — OFFICE VISIT (OUTPATIENT)
Dept: NEUROLOGY | Facility: CLINIC | Age: 42
End: 2022-05-26
Payer: COMMERCIAL

## 2022-05-26 ENCOUNTER — TELEPHONE (OUTPATIENT)
Dept: PHARMACY | Facility: CLINIC | Age: 42
End: 2022-05-26
Payer: COMMERCIAL

## 2022-05-26 VITALS
HEART RATE: 76 BPM | DIASTOLIC BLOOD PRESSURE: 92 MMHG | RESPIRATION RATE: 17 BRPM | BODY MASS INDEX: 31.84 KG/M2 | SYSTOLIC BLOOD PRESSURE: 135 MMHG | HEIGHT: 58 IN | WEIGHT: 151.69 LBS | TEMPERATURE: 98 F

## 2022-05-26 DIAGNOSIS — G89.29 COVID-19 LONG HAULER MANIFESTING CHRONIC HEADACHE: ICD-10-CM

## 2022-05-26 DIAGNOSIS — G43.719 INTRACTABLE CHRONIC MIGRAINE WITHOUT AURA AND WITHOUT STATUS MIGRAINOSUS: Primary | ICD-10-CM

## 2022-05-26 DIAGNOSIS — R51.9 COVID-19 LONG HAULER MANIFESTING CHRONIC HEADACHE: ICD-10-CM

## 2022-05-26 DIAGNOSIS — U09.9 COVID-19 LONG HAULER MANIFESTING CHRONIC HEADACHE: ICD-10-CM

## 2022-05-26 PROCEDURE — 1159F PR MEDICATION LIST DOCUMENTED IN MEDICAL RECORD: ICD-10-PCS | Mod: CPTII,S$GLB,, | Performed by: PSYCHIATRY & NEUROLOGY

## 2022-05-26 PROCEDURE — 3075F PR MOST RECENT SYSTOLIC BLOOD PRESS GE 130-139MM HG: ICD-10-PCS | Mod: CPTII,S$GLB,, | Performed by: PSYCHIATRY & NEUROLOGY

## 2022-05-26 PROCEDURE — 1160F RVW MEDS BY RX/DR IN RCRD: CPT | Mod: CPTII,S$GLB,, | Performed by: PSYCHIATRY & NEUROLOGY

## 2022-05-26 PROCEDURE — 99999 PR PBB SHADOW E&M-EST. PATIENT-LVL IV: CPT | Mod: PBBFAC,,, | Performed by: PSYCHIATRY & NEUROLOGY

## 2022-05-26 PROCEDURE — 3075F SYST BP GE 130 - 139MM HG: CPT | Mod: CPTII,S$GLB,, | Performed by: PSYCHIATRY & NEUROLOGY

## 2022-05-26 PROCEDURE — 3080F DIAST BP >= 90 MM HG: CPT | Mod: CPTII,S$GLB,, | Performed by: PSYCHIATRY & NEUROLOGY

## 2022-05-26 PROCEDURE — 99214 OFFICE O/P EST MOD 30 MIN: CPT | Mod: S$GLB,,, | Performed by: PSYCHIATRY & NEUROLOGY

## 2022-05-26 PROCEDURE — 1160F PR REVIEW ALL MEDS BY PRESCRIBER/CLIN PHARMACIST DOCUMENTED: ICD-10-PCS | Mod: CPTII,S$GLB,, | Performed by: PSYCHIATRY & NEUROLOGY

## 2022-05-26 PROCEDURE — 99999 PR PBB SHADOW E&M-EST. PATIENT-LVL IV: ICD-10-PCS | Mod: PBBFAC,,, | Performed by: PSYCHIATRY & NEUROLOGY

## 2022-05-26 PROCEDURE — 3008F BODY MASS INDEX DOCD: CPT | Mod: CPTII,S$GLB,, | Performed by: PSYCHIATRY & NEUROLOGY

## 2022-05-26 PROCEDURE — 3080F PR MOST RECENT DIASTOLIC BLOOD PRESSURE >= 90 MM HG: ICD-10-PCS | Mod: CPTII,S$GLB,, | Performed by: PSYCHIATRY & NEUROLOGY

## 2022-05-26 PROCEDURE — 1159F MED LIST DOCD IN RCRD: CPT | Mod: CPTII,S$GLB,, | Performed by: PSYCHIATRY & NEUROLOGY

## 2022-05-26 PROCEDURE — 3008F PR BODY MASS INDEX (BMI) DOCUMENTED: ICD-10-PCS | Mod: CPTII,S$GLB,, | Performed by: PSYCHIATRY & NEUROLOGY

## 2022-05-26 PROCEDURE — 99214 PR OFFICE/OUTPT VISIT, EST, LEVL IV, 30-39 MIN: ICD-10-PCS | Mod: S$GLB,,, | Performed by: PSYCHIATRY & NEUROLOGY

## 2022-05-26 RX ORDER — ERENUMAB-AOOE 70 MG/ML
70 INJECTION SUBCUTANEOUS
Qty: 1 ML | Refills: 11 | Status: SHIPPED | OUTPATIENT
Start: 2022-05-26 | End: 2022-07-21

## 2022-05-26 RX ORDER — ALMOTRIPTAN 12.5 MG/1
12.5 TABLET, FILM COATED ORAL
Qty: 12 TABLET | Refills: 6 | Status: SHIPPED | OUTPATIENT
Start: 2022-05-26 | End: 2022-09-12

## 2022-05-26 NOTE — PATIENT INSTRUCTIONS
1- For preventive management: A. Continue Trokendi XR 100mg daily           B. Continue gabapentin 600mg nightly           C. Start Aimovig 70mg monthly for migraine prevention.            2- Acute management: Trial of almotriptan 12.5 mg at onse tof escalation of attack, can repeat after 4 hours. Max is 2/day, no more than 2-3 days a week or 10 days a month.

## 2022-05-26 NOTE — PROGRESS NOTES
"[facility]    Chief complaint: chronic migraine     S:  42Y RH F with HL, depression, and headache who presents for chronic migraine.  She is here for follow up last visit was on 4/13/22 at which time she was having 30/30 days of headache and 5 days a week severe. She was transitioned from Topiramate 50 mg qhs (unable to tolerate increased doses) and transitioned to Trokendi  mg daily (some word finding difficulty, but much better tolerate) and gabapentin 600 mg qhs. She has found significant benefit from the combination of medication. She is down to 2 times a week now having a migraine. She had been prescribed Naratriptan, which she reports was never filled by pharmacy) and she did take Nurtec 75 mg , but it did not work for her. She took the sumatriptan 50mg again which causes significant burning , but other than it burning and the triptan effect works. When she learned this is a normal side effect she took it again and it does resolve the attacks. She is having at least 8 severe migraine attacks a month, but no longer having the daily headache as before. She is open to added prevention and another acute medication.     Headache history from initial evaluation on 4/22:  Age at onset and course over time: she has had headaches her entire life since age 8-9 years old having migraine, where she had to go into the dark room and also as a teenager had them, about 10-12 years ago she went to neurology still having migraine, she had them on/off during the years, she mentions that since COVID January 2022 she had high fevers, chills, fatigue, muscle pain, aching, "really bad migraine during COVID". She mentions that prior to COVID she had headache level 4 days a week and would have to take Excedrin migraine to resolve it otherwise it's become severe debilitating. She would have about 3 days a week without headache. She had that frequency for years or so. She mentiosn that over " those 10 years she was good, but she would have every so often that did not resolve with excedrin. After COVID she has had the headache now all the time. As soon as she wake sup has to treat with excedrin otherwise by 10am it's severe. She mentiosn that excedrin brings it down. She has had now 2 weeks since last Monday that she has had a daily headache. She mentions that about 2 weeks she had severe attack with difficulty opening her mouth, locked jaw, very blurry vision, she also had some blurry vision binocular diplopia which lasted a few hours. She had severe migraine that day. She had to take something. She had some slurred speech. She called her PCP and was seen 2 days after that and started on TPX 50mg nightly and has not seen improvement.     She mentions that her migraine attacks have worsened significantly since COVID infection. since February 2022  She takes excedrin every single day 2 times a day.   Location: occipital area into the forehead  Character: pressure, throbbing/pounding , dull burning pain, neck area (had neck pain prior to covid infection)  Intensity: 1-10/10, currently 6/10   Frequency: daily, 5 days a week severe   Duration: constant, but escalations last days   Aura:  None   Associated symptoms: photophobia, phonophobia, osmophobia, kinesiophobia, neck tightness/pain, nausea/vomiting  Other neurologic symptoms: dizziness (lightheaded, feels disoriented at work since TPX), vertigo, blurry vision, double vision (for a few hours 2 weeks ago), mood changes, problems with concentration/memory/task completion/relxation, neck pain, nasal congestions and sinus pressure  Precipitating factors: sleep deprivation,  missed meal, exercise, foods, alcohol, weather changes, stress  Alleviating factors: sleep, darkness, ice, massage, medications  Aggravating factors: exposure to light, sound, smells, coughing/bending over, foods (strong scented foods like onions and eating it will aggravate),  "stress  Reports "spotty vision when bending over" during a migraine only  Reports pulsatile tinnitus   Family history of headache:  MGF passed from aneurysm and  from SAH. MGM stroke , her own daughter has migraine   ER/UC visits: none   Caffeine: 1 cup/day   Sleep: poor, unrefreshed sleep   GYN: s/p hysterectomy     Medication history:  Acute:  Excedrin 2 times a day everyday - since - prior to that about 16 days a month for years.   Sumatriptan 50 mg - took twice had a "weird reaction to it" felt like head is burning and does not like it.  Resolves migraine, still has triptan effect.   Ibuprofen  apap  Asa   Ketorolac  Naproxen     Prevention:  topiramate 25mg on 3/31/22 prescribed , was increased to 50mg 1 week ago or so - some benefit, headche overnight improved, she can sleep better, feeling cognitively impaired, feeling foggy, she is lightheaded, and also having paresthesias.   wellbutrin- for depression, no benefit in headache    Trokendi XR 100mg - has had improvement, tolerating much better   Gabapentin 600mg (started on - present): improvement       Neuroimaging and other studies:  EXAMINATION:  MRI BRAIN W WO CONTRAST; MRA BRAIN WITHOUT CONTRAST     CLINICAL HISTORY:  significant worsening of headache since COVID 19 infection, episode of slurred speech, double vision, please do MP rage to evaluate for venous sinus thrombus.;; sudden worsening headache, episode of diplopia, slurred speech, family history intracranial aneurysm.;Chronic migraine without aura, intractable, without status migrainosus; Family history of ischemic heart disease and other diseases of the circulatory system     TECHNIQUE:  Multiplanar multisequence MR imaging of the brain was performed without contrast.  By separate acquisition, noncontrast MR angiography was performed of the intracranial vasculature with 3D time-of-flight technique through the [place], with MIP reformatting.     COMPARISON:  No relevant " prior imaging for comparison.     FINDINGS:  There is 5-6 mm of inferior extension of cerebellar tonsils below the foramen magnum concerning for tonsillar ectopia versus Chiari malformation.     The ventricles are normal in size without hydrocephalus.  There is no midline shift or mass effect.  No restricted diffusion to suggest acute or recent infarction.  No abnormal parenchymal susceptibility to suggest parenchymal hemorrhage.     Superior sagittal sinus is patent.  There is attenuated configuration of the posterior aspect of the superior sagittal sinus which likely represents developmental variant without signal abnormality on additional sequences to suggest sinus thrombosis.     Bilateral transverse and sigmoid dural venous sinuses are patent.     There is mild moderate patchy opacities and mucosal thickening ethmoid air cells with scattered mucosal thickening in the maxillary antra greater on the right with lobular opacity floor right maxillary antra suggestive for superimposed mucous retention cyst.     Degenerative change in the visualized cervical spine partially included in the study further evaluation as warranted clinically.     MRA (Intracranial Arteries):Anterior circulation: Bilateral distal cervical, petrous, cavernous and supraclinoid segments of the ICAs and the visualized anterior middle cerebral arteries are patent without significant focal stenosis or aneurysm.     Posterior circulation: Distal vertebral arteries, basilar artery and posterior cerebral arteries are patent without focal stenosis or definite aneurysm.     Impression:     MRI brain: 5-6 mm inferior extension cerebellar tonsils below foramen magnum concerning for tonsillar ectopia versus Chiari malformation.     No evidence for hydrocephalus.     Otherwise unremarkable MRI brain specifically without evidence for acute infarction or parenchymal edema signal abnormality     Please note there is slight attenuation of the superior  sagittal sinus caliber posteriorly which is likely related to developmental variant without additional signal abnormality to suggest dural venous sinus thrombosis.     Mild moderate patchy paranasal sinus opacities.     MRA head: Unremarkable noncontrast MRA head as detailed above specifically without evidence for proximal stenosis/occlusion or intracranial aneurysm.      ROS: The fourteen point review of system was performed.   Constitutional:  +change in appetite, fatigue  Eyes:                        +blurry vision, had a few hours of binocular horizontal diplopia with severe headache   ENT:   Denies hearing loss, infection, carcinoma or other diseases excepting any listed above.   Cardiovascular:  Denies stroke, CAD, arrhythmia, CHF or other disease excepting any listed above.  Pulmonary:  Denies dyspnea, COPD, RAD or infection or neoplasm excepting any listed above.  Gastrointestinal: Denies ulcer disease, liver or other disease excepting any listed above.  Skin:   Denies rash, skin cancer, or other cutaneous disorder excepting any listed above.  Neurological:  See HPI  Musculoskeletal: +muscle pain, joint pain  Heme/Lymphatic: Denies any blood or lymph system neoplasm or disorder excepting any listed above.  Endocrine:  Denies any thyroid or other disorders, excepting any listed above.  Allergic/Immuno: Denies any autoimmune disease or allergy excepting any listed above.  Psychiatric:  Denies any disorder or treatment excepting any listed above.  Urologic:  Denies any difficulties with the urinary system or sexual function except noted above.    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   Social History     Tobacco Use    Smoking status: Never Smoker    Smokeless tobacco: Never Used   Substance Use Topics    Alcohol use: Yes    Drug use: Never         Review of patient's allergies indicates:   Allergen Reactions    Codeine Hives         Current Outpatient Medications:     buPROPion (WELLBUTRIN XL) 150 MG TB24 tablet, TAKE 1 TABLET BY MOUTH DAILY, Disp: 90 tablet, Rfl: 1    butalbital-acetaminophen-caffeine -40 mg (FIORICET, ESGIC) -40 mg per tablet, 1 tab po TID x 3 days,t hen 1 tab po bid x 2 days, then 1 tab daily x 1 day., Disp: 14 tablet, Rfl: 0    cetirizine (ZYRTEC) 10 MG tablet, Take 1 tablet (10 mg total) by mouth once daily., Disp: 30 tablet, Rfl: 2    gabapentin (NEURONTIN) 300 MG capsule, 1 capsule po qhs x 1 week, then 2 capsules po qhs, Disp: 60 capsule, Rfl: 6    meclizine (ANTIVERT) 25 mg tablet, Take 1 tablet (25 mg total) by mouth 3 (three) times daily as needed for Dizziness or Nausea., Disp: 30 tablet, Rfl: 2    naratriptan (AMERGE) 2.5 MG tablet, 2.5 mg at onset of headache, may repeat in 4 hours if needed. Max 2/day., Disp: 12 tablet, Rfl: 6    rosuvastatin (CRESTOR) 40 MG Tab, Take 1 tablet (40 mg total) by mouth every evening., Disp: 90 tablet, Rfl: 3    sumatriptan (IMITREX) 50 MG tablet, Take 1 tablet (50 mg total) by mouth every 2 (two) hours as needed for Migraine (Do not exceed 200 mg per 24 hours)., Disp: 9 tablet, Rfl: 0    topiramate (TOPAMAX) 25 MG tablet, Take 2 tablets (50 mg total) by mouth once daily., Disp: 30 tablet, Rfl: 0    topiramate (TROKENDI XR) 50 mg Cp24, Take 1 capsule by mouth daily x 1 week, then take 2 capsules by mouth daily and continue this dose., Disp: 60 capsule, Rfl: 11      PHYSICAL EXAMINATION  Vitals:    05/26/22 0837   BP: (!) 135/92   Pulse: 76   Resp: 17   Temp: 98.3 °F (36.8  °C)     General: The patient is a well-developed, well-nourished looking of stated age in no acute distress.  NEUROLOGIC EXAM:  MENTAL: The patient is awake, alert and oriented times to time, place, location and situation. Intact recent memory, attention, concentration. Language and speech are normal. No aphasia, no dysarthria  CRANIAL NERVES:  Eomi, no facial asymmetry    MOTOR EXAM: shows normal strength ( 5/5 strength) in all 4 extremities  CEREBELLAR EXAM: no UE dysmetria   GAIT/STANCE:  Standard gait was normal with normal stride, arm swing and turning. No gait ataxia.        Impression:  Chronic migraine without aura - she has had lifelong migraine headache, at least 10-12 years of chronic migraine with using Excedrin over 15 days a month, but after COVID 19 infection on 1/22 (breakthrough infection) she quickly had severe migraine during infection, despite recovering from other symptoms her headaches continue and have become daily constant with increase in the use of Excedrin with minimal relief. She had labs which did not reveal any abnormalities including cbc, cmp, tsh, vitamin b12, crp, esr. MRI brain and MRA head showed a Chiari malformation, which patient reports she was aware about from an MRI years ago, she has not Chiari symptoms currently. She has improved on Trokendi XR 100mg and gabapentin 600mg qhs which has improved down to 2 days per week for severe migraine. She is having about 8 severe attacks per month. We will improve prevention with CGRP Mab and opted for Aimovig 70mg monthly. She was taught how to do the injection by our staff. She is aware of the side effect profile. Sumatriptan 50mg works, but causes significant triptan effect thus will try to transition to almotriptan 12.5mg. she is ok with plan.     Covid 19 infection long hauler with chronic daily headache - over 3 months of this chronic daily headache and COVID 19 infection    Medication overuse headache from excedrin -  Resolved      Comorbidities:  Depression- well controlled  HL- well controlled   Obesity - she is working on healthier eating, trokendi might help with weight as well.   Family history of intracranial aneurysm - in MGM  from aSAH, MRA normal on     Plan:   1- For preventive management: A. Continue Trokendi XR 100mg daily           B. Continue gabapentin 600mg nightly           C. Start Aimovig 70mg monthly for migraine prevention.            2- Acute management: Trial of almotriptan 12.5 mg at onse tof escalation of attack, can repeat after 4 hours. Max is 2/day, no more than 2-3 days a week or 10 days a month.     3-Keep track of headache   RTC in 3 months.         Evi Dunn MD   Board Certified Neurologist  [facility] Certified Headache Medicine     I spent 35  minutes on day of this encounter preparing, treating, and managing this patient with chronic migraine,covid 19 long hauler chronic daily headache, moh, depression, HL, obesity.

## 2022-05-31 ENCOUNTER — OFFICE VISIT (OUTPATIENT)
Dept: FAMILY MEDICINE | Facility: CLINIC | Age: 42
End: 2022-05-31
Payer: COMMERCIAL

## 2022-05-31 VITALS
WEIGHT: 151.44 LBS | TEMPERATURE: 99 F | DIASTOLIC BLOOD PRESSURE: 88 MMHG | HEIGHT: 58 IN | HEART RATE: 93 BPM | BODY MASS INDEX: 31.79 KG/M2 | OXYGEN SATURATION: 97 % | SYSTOLIC BLOOD PRESSURE: 108 MMHG

## 2022-05-31 DIAGNOSIS — B96.89 ACUTE BACTERIAL PHARYNGITIS: Primary | ICD-10-CM

## 2022-05-31 DIAGNOSIS — E78.2 MIXED HYPERLIPIDEMIA: ICD-10-CM

## 2022-05-31 DIAGNOSIS — Z12.31 ENCOUNTER FOR SCREENING MAMMOGRAM FOR MALIGNANT NEOPLASM OF BREAST: ICD-10-CM

## 2022-05-31 DIAGNOSIS — J02.8 ACUTE BACTERIAL PHARYNGITIS: Primary | ICD-10-CM

## 2022-05-31 DIAGNOSIS — R09.89 CHEST CONGESTION: ICD-10-CM

## 2022-05-31 DIAGNOSIS — E66.9 CLASS 1 OBESITY WITH BODY MASS INDEX (BMI) OF 31.0 TO 31.9 IN ADULT, UNSPECIFIED OBESITY TYPE, UNSPECIFIED WHETHER SERIOUS COMORBIDITY PRESENT: ICD-10-CM

## 2022-05-31 PROCEDURE — 96372 PR INJECTION,THERAP/PROPH/DIAG2ST, IM OR SUBCUT: ICD-10-PCS | Mod: S$GLB,,, | Performed by: NURSE PRACTITIONER

## 2022-05-31 PROCEDURE — 96372 THER/PROPH/DIAG INJ SC/IM: CPT | Mod: S$GLB,,, | Performed by: NURSE PRACTITIONER

## 2022-05-31 PROCEDURE — 1159F MED LIST DOCD IN RCRD: CPT | Mod: CPTII,S$GLB,, | Performed by: NURSE PRACTITIONER

## 2022-05-31 PROCEDURE — 3074F PR MOST RECENT SYSTOLIC BLOOD PRESSURE < 130 MM HG: ICD-10-PCS | Mod: CPTII,S$GLB,, | Performed by: NURSE PRACTITIONER

## 2022-05-31 PROCEDURE — 3074F SYST BP LT 130 MM HG: CPT | Mod: CPTII,S$GLB,, | Performed by: NURSE PRACTITIONER

## 2022-05-31 PROCEDURE — 99213 OFFICE O/P EST LOW 20 MIN: CPT | Mod: 25,S$GLB,, | Performed by: NURSE PRACTITIONER

## 2022-05-31 PROCEDURE — 99999 PR PBB SHADOW E&M-EST. PATIENT-LVL V: ICD-10-PCS | Mod: PBBFAC,,, | Performed by: NURSE PRACTITIONER

## 2022-05-31 PROCEDURE — 99213 PR OFFICE/OUTPT VISIT, EST, LEVL III, 20-29 MIN: ICD-10-PCS | Mod: 25,S$GLB,, | Performed by: NURSE PRACTITIONER

## 2022-05-31 PROCEDURE — 3079F PR MOST RECENT DIASTOLIC BLOOD PRESSURE 80-89 MM HG: ICD-10-PCS | Mod: CPTII,S$GLB,, | Performed by: NURSE PRACTITIONER

## 2022-05-31 PROCEDURE — 1160F PR REVIEW ALL MEDS BY PRESCRIBER/CLIN PHARMACIST DOCUMENTED: ICD-10-PCS | Mod: CPTII,S$GLB,, | Performed by: NURSE PRACTITIONER

## 2022-05-31 PROCEDURE — 1159F PR MEDICATION LIST DOCUMENTED IN MEDICAL RECORD: ICD-10-PCS | Mod: CPTII,S$GLB,, | Performed by: NURSE PRACTITIONER

## 2022-05-31 PROCEDURE — 3079F DIAST BP 80-89 MM HG: CPT | Mod: CPTII,S$GLB,, | Performed by: NURSE PRACTITIONER

## 2022-05-31 PROCEDURE — 99999 PR PBB SHADOW E&M-EST. PATIENT-LVL V: CPT | Mod: PBBFAC,,, | Performed by: NURSE PRACTITIONER

## 2022-05-31 PROCEDURE — 3008F BODY MASS INDEX DOCD: CPT | Mod: CPTII,S$GLB,, | Performed by: NURSE PRACTITIONER

## 2022-05-31 PROCEDURE — 1160F RVW MEDS BY RX/DR IN RCRD: CPT | Mod: CPTII,S$GLB,, | Performed by: NURSE PRACTITIONER

## 2022-05-31 PROCEDURE — 3008F PR BODY MASS INDEX (BMI) DOCUMENTED: ICD-10-PCS | Mod: CPTII,S$GLB,, | Performed by: NURSE PRACTITIONER

## 2022-05-31 RX ORDER — AMOXICILLIN 875 MG/1
875 TABLET, FILM COATED ORAL EVERY 12 HOURS
Qty: 14 TABLET | Refills: 0 | Status: SHIPPED | OUTPATIENT
Start: 2022-05-31 | End: 2022-06-07

## 2022-05-31 RX ORDER — PROMETHAZINE HYDROCHLORIDE AND DEXTROMETHORPHAN HYDROBROMIDE 6.25; 15 MG/5ML; MG/5ML
5 SYRUP ORAL EVERY 4 HOURS PRN
Qty: 240 ML | Refills: 1 | Status: SHIPPED | OUTPATIENT
Start: 2022-05-31 | End: 2022-06-10

## 2022-05-31 RX ORDER — DEXAMETHASONE SODIUM PHOSPHATE 4 MG/ML
8 INJECTION, SOLUTION INTRA-ARTICULAR; INTRALESIONAL; INTRAMUSCULAR; INTRAVENOUS; SOFT TISSUE
Status: COMPLETED | OUTPATIENT
Start: 2022-05-31 | End: 2022-05-31

## 2022-05-31 RX ADMIN — DEXAMETHASONE SODIUM PHOSPHATE 8 MG: 4 INJECTION, SOLUTION INTRA-ARTICULAR; INTRALESIONAL; INTRAMUSCULAR; INTRAVENOUS; SOFT TISSUE at 08:05

## 2022-05-31 NOTE — PROGRESS NOTES
Patient tolerated dexamethasone 8 mg injection well, instructed to remain in clinic for 15mins.to monitor for allergic reaction. Verbalized understanding.

## 2022-05-31 NOTE — PROGRESS NOTES
Chief Complaint  Chief Complaint   Patient presents with    Sore Throat       HPI  Crystal Greer is a 42 y.o. female with medical diagnoses as listed in the medical history and problem list that presents for Acute Bacteria Pharyngitis. This patient is new to me.     1. Acute Bacteria Pharyngitis: Reports symptoms started 4 days ago. Symptoms include sore throat, hoarseness, chest congestion, productive cough, chills and generalized body aches. Reports Phlegm is greenish-brown. Reports her son experienced a URI 1 week ago. Vitals stable in clinic today. Remains Afebrile. Reports taking Tylenol to treat symptoms. Home Covid test x 2 were negative.    - Reports Hx of Hysterectomy      PAST MEDICAL HISTORY:  Past Medical History:   Diagnosis Date    Headache        PAST SURGICAL HISTORY:  Past Surgical History:   Procedure Laterality Date     SECTION      HYSTERECTOMY      TOTAL REDUCTION MAMMOPLASTY      TUBAL LIGATION         SOCIAL HISTORY:  Social History     Socioeconomic History    Marital status: Single   Tobacco Use    Smoking status: Never Smoker    Smokeless tobacco: Never Used   Substance and Sexual Activity    Alcohol use: Yes    Drug use: Never    Sexual activity: Yes     Partners: Male       FAMILY HISTORY:  Family History   Problem Relation Age of Onset    Diabetes Maternal Grandmother     Stroke Maternal Grandmother     No Known Problems Mother     No Known Problems Father        ALLERGIES AND MEDICATIONS: updated and reviewed.  Review of patient's allergies indicates:   Allergen Reactions    Codeine Hives     Current Outpatient Medications   Medication Sig Dispense Refill    almotriptan (AXERT) 12.5 MG tablet Take 1 tablet (12.5 mg total) by mouth as needed for Migraine (can repeat after 2 hours. max 2/day.). may repeat in 2 hours if needed. Max 2/day. 12 tablet 6    buPROPion (WELLBUTRIN XL) 150 MG TB24 tablet TAKE 1 TABLET BY MOUTH DAILY 90 tablet 1     butalbital-acetaminophen-caffeine -40 mg (FIORICET, ESGIC) -40 mg per tablet 1 tab po TID x 3 days,t hen 1 tab po bid x 2 days, then 1 tab daily x 1 day. 14 tablet 0    erenumab-aooe (AIMOVIG AUTOINJECTOR) 70 mg/mL autoinjector Inject 1 mL (70 mg total) into the skin every 28 days. 1 mL 11    gabapentin (NEURONTIN) 300 MG capsule 1 capsule po qhs x 1 week, then 2 capsules po qhs 60 capsule 6    naratriptan (AMERGE) 2.5 MG tablet 2.5 mg at onset of headache, may repeat in 4 hours if needed. Max 2/day. 12 tablet 6    rosuvastatin (CRESTOR) 40 MG Tab Take 1 tablet (40 mg total) by mouth every evening. 90 tablet 3    topiramate (TROKENDI XR) 50 mg Cp24 Take 1 capsule by mouth daily x 1 week, then take 2 capsules by mouth daily and continue this dose. 60 capsule 11    amoxicillin (AMOXIL) 875 MG tablet Take 1 tablet (875 mg total) by mouth every 12 (twelve) hours. for 7 days 14 tablet 0    cetirizine (ZYRTEC) 10 MG tablet Take 1 tablet (10 mg total) by mouth once daily. (Patient not taking: Reported on 5/31/2022) 30 tablet 2    promethazine-dextromethorphan (PROMETHAZINE-DM) 6.25-15 mg/5 mL Syrp Take 5 mLs by mouth every 4 (four) hours as needed (As needed). 240 mL 1     No current facility-administered medications for this visit.         ROS  Review of Systems   Constitutional: Positive for chills. Negative for appetite change, fatigue and fever.   HENT: Positive for congestion and sore throat. Negative for ear pain, postnasal drip, rhinorrhea, sinus pressure and sneezing.    Respiratory: Positive for cough. Negative for shortness of breath.    Cardiovascular: Negative for chest pain and palpitations.   Gastrointestinal: Negative for abdominal pain, constipation, diarrhea, nausea and vomiting.   Genitourinary: Negative for dysuria.   Musculoskeletal: Negative for arthralgias.   Neurological: Negative for headaches.   Psychiatric/Behavioral: Negative for sleep disturbance.           Physical  "Exam  Vitals:    05/31/22 0742   BP: 108/88   Pulse: 93   Temp: 98.6 °F (37 °C)   TempSrc: Oral   SpO2: 97%   Weight: 68.7 kg (151 lb 7.3 oz)   Height: 4' 10" (1.473 m)    Body mass index is 31.65 kg/m².  Weight: 68.7 kg (151 lb 7.3 oz)   Height: 4' 10" (147.3 cm)   Physical Exam  Constitutional:       General: She is not in acute distress.  HENT:      Head: Normocephalic and atraumatic.      Right Ear: External ear normal. A middle ear effusion is present.      Left Ear: External ear normal. A middle ear effusion is present.      Nose: Nose normal.      Mouth/Throat:      Mouth: Mucous membranes are moist.      Pharynx: Pharyngeal swelling and posterior oropharyngeal erythema present.      Tonsils: Tonsillar exudate present.   Eyes:      Pupils: Pupils are equal, round, and reactive to light.   Cardiovascular:      Rate and Rhythm: Normal rate and regular rhythm.   Pulmonary:      Effort: Pulmonary effort is normal. No respiratory distress.      Breath sounds: Normal breath sounds. No wheezing.   Abdominal:      General: Bowel sounds are normal.      Palpations: Abdomen is soft.   Musculoskeletal:      Cervical back: Neck supple.   Lymphadenopathy:      Cervical: No cervical adenopathy.   Skin:     General: Skin is warm and dry.   Neurological:      Mental Status: She is alert and oriented to person, place, and time.   Psychiatric:         Mood and Affect: Mood normal.             Health Maintenance       Date Due Completion Date    TETANUS VACCINE Never done ---    Mammogram 07/06/2022 7/6/2021    Influenza Vaccine (Season Ended) 09/01/2022 ---            Assessment & Plan  Problem List Items Addressed This Visit        Cardiac/Vascular    Mixed hyperlipidemia  -The current medical regimen is effective;  continue present plan and medications.       Other Visit Diagnoses     Acute bacterial pharyngitis    -  Primary  - POCT Strep and Flu negative in clinic  -Empirical treatment with Amoxil  -We discussed proper " administration of Amoxil, adverse effects and side effects with education given for reinforcement  - Patient tolerated Decadron Injection well  -Advised to use OTC Chloroseptic spray or Cepacol for throat irritation  -Patient verbalized understanding of plan discussed in clinic.    Relevant Medications    amoxicillin (AMOXIL) 875 MG tablet, BID x 7 days    dexamethasone injection 8 mg (Start on 5/31/2022  8:15 AM)    Other Relevant Orders    POCT Strep A, Molecular    POCT Influenza A/B    Chest congestion      -We discussed proper administration of Promethazine-DM, adverse effects and side effects with education given for reinforcement  - POCT Strep and Flu negative in clinic  -Patient verbalized understanding of plan discussed in clinic.    Relevant Medications    promethazine-dextromethorphan (PROMETHAZINE-DM) 6.25-15 mg/5 mL Syrp    Other Relevant Orders    POCT Strep A, Molecular    POCT Influenza A/B    Class 1 obesity with body mass index (BMI) of 31.0 to 31.9 in adult, unspecified obesity type, unspecified whether serious comorbidity present      -Diet and exercise discussed with patient.     Encounter for screening mammogram for malignant neoplasm of breast      -Diet and exercise discussed with patient.     Relevant Orders    Mammo Digital Screening [employer] reviewed: -As ordered above    Follow-up: As needed

## 2022-06-13 DIAGNOSIS — F32.9 REACTIVE DEPRESSION: ICD-10-CM

## 2022-06-13 RX ORDER — BUPROPION HYDROCHLORIDE 150 MG/1
150 TABLET ORAL DAILY
Qty: 90 TABLET | Refills: 1 | Status: SHIPPED | OUTPATIENT
Start: 2022-06-13 | End: 2023-01-03 | Stop reason: SDUPTHER

## 2022-06-13 NOTE — TELEPHONE ENCOUNTER
Last Office Visit Info:   The patient's last visit with Isha Espinal MD was on 3/31/2022.    The patient's last visit in current department was on 3/31/2022.        Last CBC Results:   Lab Results   Component Value Date    WBC 7.28 03/31/2022    HGB 13.8 03/31/2022    HCT 42.6 03/31/2022     03/31/2022       Last CMP Results  Lab Results   Component Value Date     03/31/2022    K 3.5 03/31/2022     03/31/2022    CO2 24 03/31/2022    BUN 14 03/31/2022    CREATININE 0.8 03/31/2022    CALCIUM 9.1 03/31/2022    ALBUMIN 4.0 03/31/2022    AST 20 03/31/2022    ALT 18 03/31/2022       Last Lipids  Lab Results   Component Value Date    CHOL 217 (H) 06/14/2021    TRIG 251 (H) 06/14/2021    HDL 45 06/14/2021    LDLCALC 121.8 06/14/2021       Last A1C  Lab Results   Component Value Date    HGBA1C 5.0 06/14/2021       Last TSH  Lab Results   Component Value Date    TSH 0.915 03/31/2022             Current Med Refills  Medication List with Changes/Refills   Current Medications    ALMOTRIPTAN (AXERT) 12.5 MG TABLET    Take 1 tablet (12.5 mg total) by mouth as needed for Migraine (can repeat after 2 hours. max 2/day.). may repeat in 2 hours if needed. Max 2/day.       Start Date: 5/26/2022 End Date: --    BUPROPION (WELLBUTRIN XL) 150 MG TB24 TABLET    TAKE 1 TABLET BY MOUTH DAILY       Start Date: 10/24/2021End Date: --    BUTALBITAL-ACETAMINOPHEN-CAFFEINE -40 MG (FIORICET, ESGIC) -40 MG PER TABLET    1 tab po TID x 3 days,t hen 1 tab po bid x 2 days, then 1 tab daily x 1 day.       Start Date: 4/13/2022 End Date: --    CETIRIZINE (ZYRTEC) 10 MG TABLET    Take 1 tablet (10 mg total) by mouth once daily.       Start Date: 10/25/2021End Date: 10/25/2022    ERENUMAB-AOOE (AIMOVIG AUTOINJECTOR) 70 MG/ML AUTOINJECTOR    Inject 1 mL (70 mg total) into the skin every 28 days.       Start Date: 5/26/2022 End Date: --    GABAPENTIN (NEURONTIN) 300 MG CAPSULE    1 capsule po qhs x 1 week, then 2 capsules po  qhs       Start Date: 4/13/2022 End Date: --    NARATRIPTAN (AMERGE) 2.5 MG TABLET    2.5 mg at onset of headache, may repeat in 4 hours if needed. Max 2/day.       Start Date: 4/13/2022 End Date: --    ROSUVASTATIN (CRESTOR) 40 MG TAB    Take 1 tablet (40 mg total) by mouth every evening.       Start Date: 10/25/2021End Date: 10/25/2022    TOPIRAMATE (TROKENDI XR) 50 MG CP24    Take 1 capsule by mouth daily x 1 week, then take 2 capsules by mouth daily and continue this dose.       Start Date: 4/13/2022 End Date: --       Order(s) placed per written order guidelines:     Please advise.

## 2022-06-13 NOTE — TELEPHONE ENCOUNTER
No new care gaps identified.  [facility] Embedded Care Gaps. Reference number: [id number]. 6/13/2022   8:00:47 AM CDT

## 2022-07-07 ENCOUNTER — HOSPITAL ENCOUNTER (OUTPATIENT)
Dept: RADIOLOGY | Facility: HOSPITAL | Age: 42
Discharge: HOME | End: 2022-07-07
Attending: NURSE PRACTITIONER
Payer: COMMERCIAL

## 2022-07-07 VITALS — BODY MASS INDEX: 31.79 KG/M2 | HEIGHT: 58 IN | WEIGHT: 151.44 LBS

## 2022-07-07 DIAGNOSIS — Z12.31 ENCOUNTER FOR SCREENING MAMMOGRAM FOR MALIGNANT NEOPLASM OF BREAST: ICD-10-CM

## 2022-07-07 PROCEDURE — 77063 BREAST TOMOSYNTHESIS BI: CPT | Mod: TC

## 2022-07-07 PROCEDURE — 77063 BREAST TOMOSYNTHESIS BI: CPT | Mod: 26,,, | Performed by: RADIOLOGY

## 2022-07-07 PROCEDURE — 77067 SCR MAMMO BI INCL CAD: CPT | Mod: 26,,, | Performed by: RADIOLOGY

## 2022-07-07 PROCEDURE — 77067 MAMMO DIGITAL SCREENING BILAT WITH TOMO: ICD-10-PCS | Mod: 26,,, | Performed by: RADIOLOGY

## 2022-07-07 PROCEDURE — 77063 MAMMO DIGITAL SCREENING BILAT WITH TOMO: ICD-10-PCS | Mod: 26,,, | Performed by: RADIOLOGY

## 2022-07-12 ENCOUNTER — PATIENT MESSAGE (OUTPATIENT)
Dept: NEUROLOGY | Facility: CLINIC | Age: 42
End: 2022-07-12
Payer: COMMERCIAL

## 2022-07-12 DIAGNOSIS — K59.00 CONSTIPATION, UNSPECIFIED CONSTIPATION TYPE: Primary | ICD-10-CM

## 2022-07-12 RX ORDER — POLYETHYLENE GLYCOL 3350 17 G/17G
17 POWDER, FOR SOLUTION ORAL DAILY
Qty: 30 PACKET | Refills: 3 | Status: SHIPPED | OUTPATIENT
Start: 2022-07-12 | End: 2022-09-12

## 2022-07-21 ENCOUNTER — PATIENT MESSAGE (OUTPATIENT)
Dept: NEUROLOGY | Facility: CLINIC | Age: 42
End: 2022-07-21
Payer: COMMERCIAL

## 2022-07-27 ENCOUNTER — TELEPHONE (OUTPATIENT)
Dept: PHARMACY | Facility: CLINIC | Age: 42
End: 2022-07-27
Payer: COMMERCIAL

## 2022-07-27 ENCOUNTER — PATIENT MESSAGE (OUTPATIENT)
Dept: NEUROLOGY | Facility: CLINIC | Age: 42
End: 2022-07-27
Payer: COMMERCIAL

## 2022-09-01 ENCOUNTER — OFFICE VISIT (OUTPATIENT)
Dept: NEUROLOGY | Facility: CLINIC | Age: 42
End: 2022-09-01
Payer: COMMERCIAL

## 2022-09-01 ENCOUNTER — TELEPHONE (OUTPATIENT)
Dept: NEUROLOGY | Facility: CLINIC | Age: 42
End: 2022-09-01

## 2022-09-01 VITALS
DIASTOLIC BLOOD PRESSURE: 87 MMHG | HEART RATE: 76 BPM | HEIGHT: 58 IN | SYSTOLIC BLOOD PRESSURE: 123 MMHG | BODY MASS INDEX: 31.7 KG/M2 | WEIGHT: 151 LBS | RESPIRATION RATE: 17 BRPM

## 2022-09-01 DIAGNOSIS — T88.7XXA MEDICATION SIDE EFFECT: ICD-10-CM

## 2022-09-01 DIAGNOSIS — R51.9 COVID-19 LONG HAULER MANIFESTING CHRONIC HEADACHE: ICD-10-CM

## 2022-09-01 DIAGNOSIS — U09.9 COVID-19 LONG HAULER MANIFESTING CHRONIC HEADACHE: ICD-10-CM

## 2022-09-01 DIAGNOSIS — G43.019 INTRACTABLE MIGRAINE WITHOUT AURA AND WITHOUT STATUS MIGRAINOSUS: Primary | ICD-10-CM

## 2022-09-01 DIAGNOSIS — G89.29 COVID-19 LONG HAULER MANIFESTING CHRONIC HEADACHE: ICD-10-CM

## 2022-09-01 PROCEDURE — 99214 OFFICE O/P EST MOD 30 MIN: CPT | Mod: S$GLB,,, | Performed by: PSYCHIATRY & NEUROLOGY

## 2022-09-01 PROCEDURE — 1160F PR REVIEW ALL MEDS BY PRESCRIBER/CLIN PHARMACIST DOCUMENTED: ICD-10-PCS | Mod: CPTII,S$GLB,, | Performed by: PSYCHIATRY & NEUROLOGY

## 2022-09-01 PROCEDURE — 99999 PR PBB SHADOW E&M-EST. PATIENT-LVL IV: ICD-10-PCS | Mod: PBBFAC,,, | Performed by: PSYCHIATRY & NEUROLOGY

## 2022-09-01 PROCEDURE — 3074F PR MOST RECENT SYSTOLIC BLOOD PRESSURE < 130 MM HG: ICD-10-PCS | Mod: CPTII,S$GLB,, | Performed by: PSYCHIATRY & NEUROLOGY

## 2022-09-01 PROCEDURE — 3079F PR MOST RECENT DIASTOLIC BLOOD PRESSURE 80-89 MM HG: ICD-10-PCS | Mod: CPTII,S$GLB,, | Performed by: PSYCHIATRY & NEUROLOGY

## 2022-09-01 PROCEDURE — 3008F BODY MASS INDEX DOCD: CPT | Mod: CPTII,S$GLB,, | Performed by: PSYCHIATRY & NEUROLOGY

## 2022-09-01 PROCEDURE — 3079F DIAST BP 80-89 MM HG: CPT | Mod: CPTII,S$GLB,, | Performed by: PSYCHIATRY & NEUROLOGY

## 2022-09-01 PROCEDURE — 99214 PR OFFICE/OUTPT VISIT, EST, LEVL IV, 30-39 MIN: ICD-10-PCS | Mod: S$GLB,,, | Performed by: PSYCHIATRY & NEUROLOGY

## 2022-09-01 PROCEDURE — 1159F MED LIST DOCD IN RCRD: CPT | Mod: CPTII,S$GLB,, | Performed by: PSYCHIATRY & NEUROLOGY

## 2022-09-01 PROCEDURE — 1159F PR MEDICATION LIST DOCUMENTED IN MEDICAL RECORD: ICD-10-PCS | Mod: CPTII,S$GLB,, | Performed by: PSYCHIATRY & NEUROLOGY

## 2022-09-01 PROCEDURE — 3008F PR BODY MASS INDEX (BMI) DOCUMENTED: ICD-10-PCS | Mod: CPTII,S$GLB,, | Performed by: PSYCHIATRY & NEUROLOGY

## 2022-09-01 PROCEDURE — 3074F SYST BP LT 130 MM HG: CPT | Mod: CPTII,S$GLB,, | Performed by: PSYCHIATRY & NEUROLOGY

## 2022-09-01 PROCEDURE — 1160F RVW MEDS BY RX/DR IN RCRD: CPT | Mod: CPTII,S$GLB,, | Performed by: PSYCHIATRY & NEUROLOGY

## 2022-09-01 PROCEDURE — 99999 PR PBB SHADOW E&M-EST. PATIENT-LVL IV: CPT | Mod: PBBFAC,,, | Performed by: PSYCHIATRY & NEUROLOGY

## 2022-09-01 RX ORDER — ATOGEPANT 30 MG/1
30 TABLET ORAL DAILY
Qty: 30 TABLET | Refills: 11 | Status: SHIPPED | OUTPATIENT
Start: 2022-09-01 | End: 2023-09-25 | Stop reason: SDUPTHER

## 2022-09-01 NOTE — PATIENT INSTRUCTIONS
1- For preventive management: A. Continue Trokendi XR 100mg daily           B. Continue gabapentin 600mg nightly           C. Discontinue Emgality 120mg       D. Start Qulipta 30mg daily for migraine prevention  in 1 week            2- Acute management: Almotriptan 12.5 mg at onse tof escalation of attack, can repeat after 4 hours. Max is 2/day, no more than 2-3 days a week or 10 days a month.     3-Keep track of headache

## 2022-09-01 NOTE — TELEPHONE ENCOUNTER
----- Message from Jd Hanks sent at 9/1/2022  3:28 PM CDT -----  Regarding: [employer] calling about PA  Type:  Pharmacy Calling to Clarify an RX    Name of Caller:  Marita with [employer]    Pharmacy Name:    [id number] - [place] - [facility] AT [address]  [address]  [place] [address]  Phone: [phone] Fax: [phone]    Prescription Name:    atogepant (QULIPTA) 30 mg Tab 30 tablet 11 9/1/2022    Sig - Route: Take 1 tablet (30 mg) by mouth once daily. - Oral   Sent to pharmacy as: atogepant (QULIPTA) 30 mg Tab     What do they need to clarify?:  Questions about PA needed to approve med for [facility]    Best Call Back Number:  [phone]     Additional Information:  clarify demographics b/c name on form does not match name in [employer] insurance demo for pt. Please call to discuss.

## 2022-09-01 NOTE — PROGRESS NOTES
"[facility]    Chief complaint: chronic migraine     S:  42Y RH F with HL, depression, and headache who presents for migraine.  She was last seen on 6/22 at which time she was having daily headache. She was placed on Aimovig 70 mg and barely had any headache on it, but developed severe constipation and sent a message so she was switched to Emgality. She has done loading dose and 1 month of maintenance of Emgality 120mg. She continues to be much better migraine wise. In the last 3 months has had 6 severe migraine attacks and some other headache. The problem is that Emgality has unfortunately caused significant joint pain. The day of injection and for 3-4 days after she has significant joint pain in her feet, knees, elbows, hands and getting out of bed in the morning is heard it's like she has the flu, then this continues , but toward the end of month she will have more migraine nd the pain improves, but then she reinjected again and all of it restarted. She would like to change medication. She has heard of qulipta and interested in that for migraine prevention. Her goal is to d/c topiramate that despite being trokendi still has side effects.     Headache history from initial evaluation on 4/22:  Age at onset and course over time: she has had headaches her entire life since age 8-9 years old having migraine, where she had to go into the dark room and also as a teenager had them, about 10-12 years ago she went to neurology still having migraine, she had them on/off during the years, she mentions that since COVID January 2022 she had high fevers, chills, fatigue, muscle pain, aching, "really bad migraine during COVID". She mentions that prior to COVID she had headache level 4 days a week and would have to take Excedrin migraine to resolve it otherwise it's become severe debilitating. She would have about 3 days a week without headache. She had that frequency for years or so. She " mentiosn that over those 10 years she was good, but she would have every so often that did not resolve with excedrin. After COVID she has had the headache now all the time. As soon as she wake sup has to treat with excedrin otherwise by 10am it's severe. She mentiosn that excedrin brings it down. She has had now 2 weeks since last Monday that she has had a daily headache. She mentions that about 2 weeks she had severe attack with difficulty opening her mouth, locked jaw, very blurry vision, she also had some blurry vision binocular diplopia which lasted a few hours. She had severe migraine that day. She had to take something. She had some slurred speech. She called her PCP and was seen 2 days after that and started on TPX 50mg nightly and has not seen improvement.     She mentions that her migraine attacks have worsened significantly since COVID infection. since February 2022  She takes excedrin every single day 2 times a day.   Location: occipital area into the forehead  Character: pressure, throbbing/pounding , dull burning pain, neck area (had neck pain prior to covid infection)  Intensity: 1-10/10, currently 6/10   Frequency: daily, 5 days a week severe   Duration: constant, but escalations last days   Aura:  None   Associated symptoms: photophobia, phonophobia, osmophobia, kinesiophobia, neck tightness/pain, nausea/vomiting  Other neurologic symptoms: dizziness (lightheaded, feels disoriented at work since TPX), vertigo, blurry vision, double vision (for a few hours 2 weeks ago), mood changes, problems with concentration/memory/task completion/relxation, neck pain, nasal congestions and sinus pressure  Precipitating factors: sleep deprivation,  missed meal, exercise, foods, alcohol, weather changes, stress  Alleviating factors: sleep, darkness, ice, massage, medications  Aggravating factors: exposure to light, sound, smells, coughing/bending over, foods (strong scented foods like onions and eating it will  "aggravate), stress  Reports "spotty vision when bending over" during a migraine only  Reports pulsatile tinnitus   Family history of headache:  MGF passed from aneurysm and  from SAH. MGM stroke , her own daughter has migraine   ER/UC visits: none   Caffeine: 1 cup/day   Sleep: poor, unrefreshed sleep   GYN: s/p hysterectomy     Medication history:  Acute:  Excedrin 2 times a day everyday - since - prior to that about 16 days a month for years.   Sumatriptan 50 mg - took twice had a "weird reaction to it" felt like head is burning and does not like it.  Resolves migraine, still has triptan effect.   Ibuprofen  apap  Asa   Ketorolac  Naproxen     Prevention:  topiramate 25mg on 3/31/22 prescribed , was increased to 50mg 1 week ago or so - some benefit, headche overnight improved, she can sleep better, feeling cognitively impaired, feeling foggy, she is lightheaded, and also having paresthesias.   wellbutrin- for depression, no benefit in headache  Aimovig 70mg - constipation   Emgality 240 mg loading/120 mg maintenance (on - ) : significantly improved, but causing significant joint pain/achiness severe first 3-4 days of injection, but continues improves toward end of the treatment period.     Trokendi XR 100mg - has had improvement, tolerating much better   Gabapentin 600mg (started on - present): improvement       Neuroimaging and other studies:  EXAMINATION:  MRI BRAIN W WO CONTRAST; MRA BRAIN WITHOUT CONTRAST     CLINICAL HISTORY:  significant worsening of headache since COVID 19 infection, episode of slurred speech, double vision, please do MP rage to evaluate for venous sinus thrombus.;; sudden worsening headache, episode of diplopia, slurred speech, family history intracranial aneurysm.;Chronic migraine without aura, intractable, without status migrainosus; Family history of ischemic heart disease and other diseases of the circulatory system     TECHNIQUE:  Multiplanar multisequence MR " imaging of the brain was performed without contrast.  By separate acquisition, noncontrast MR angiography was performed of the intracranial vasculature with 3D time-of-flight technique through the [place] [facility] [place], with MIP reformatting.     COMPARISON:  No relevant prior imaging for comparison.     FINDINGS:  There is 5-6 mm of inferior extension of cerebellar tonsils below the foramen magnum concerning for tonsillar ectopia versus Chiari malformation.     The ventricles are normal in size without hydrocephalus.  There is no midline shift or mass effect.  No restricted diffusion to suggest acute or recent infarction.  No abnormal parenchymal susceptibility to suggest parenchymal hemorrhage.     Superior sagittal sinus is patent.  There is attenuated configuration of the posterior aspect of the superior sagittal sinus which likely represents developmental variant without signal abnormality on additional sequences to suggest sinus thrombosis.     Bilateral transverse and sigmoid dural venous sinuses are patent.     There is mild moderate patchy opacities and mucosal thickening ethmoid air cells with scattered mucosal thickening in the maxillary antra greater on the right with lobular opacity floor right maxillary antra suggestive for superimposed mucous retention cyst.     Degenerative change in the visualized cervical spine partially included in the study further evaluation as warranted clinically.     MRA (Intracranial Arteries):Anterior circulation: Bilateral distal cervical, petrous, cavernous and supraclinoid segments of the ICAs and the visualized anterior middle cerebral arteries are patent without significant focal stenosis or aneurysm.     Posterior circulation: Distal vertebral arteries, basilar artery and posterior cerebral arteries are patent without focal stenosis or definite aneurysm.     Impression:     MRI brain: 5-6 mm inferior extension cerebellar tonsils below foramen magnum concerning for  tonsillar ectopia versus Chiari malformation.     No evidence for hydrocephalus.     Otherwise unremarkable MRI brain specifically without evidence for acute infarction or parenchymal edema signal abnormality     Please note there is slight attenuation of the superior sagittal sinus caliber posteriorly which is likely related to developmental variant without additional signal abnormality to suggest dural venous sinus thrombosis.     Mild moderate patchy paranasal sinus opacities.     MRA head: Unremarkable noncontrast MRA head as detailed above specifically without evidence for proximal stenosis/occlusion or intracranial aneurysm.      ROS: The fourteen point review of system was performed.   Constitutional:  +change in appetite, fatigue  Eyes:                        +blurry vision, had a few hours of binocular horizontal diplopia with severe headache   ENT:   Denies hearing loss, infection, carcinoma or other diseases excepting any listed above.   Cardiovascular:  Denies stroke, CAD, arrhythmia, CHF or other disease excepting any listed above.  Pulmonary:  Denies dyspnea, COPD, RAD or infection or neoplasm excepting any listed above.  Gastrointestinal: Denies ulcer disease, liver or other disease excepting any listed above.  Skin:   Denies rash, skin cancer, or other cutaneous disorder excepting any listed above.  Neurological:  See HPI  Musculoskeletal: +muscle pain, joint pain  Heme/Lymphatic: Denies any blood or lymph system neoplasm or disorder excepting any listed above.  Endocrine:  Denies any thyroid or other disorders, excepting any listed above.  Allergic/Immuno: Denies any autoimmune disease or allergy excepting any listed above.  Psychiatric:  Denies any disorder or treatment excepting any listed above.  Urologic:  Denies any difficulties with the urinary system or sexual function except noted above.    No changes to PMHx, surgical or family history since last appointment.       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   Social History     Tobacco Use    Smoking status: Never Smoker    Smokeless tobacco: Never Used   Substance Use Topics    Alcohol use: Yes    Drug use: Never         Review of patient's allergies indicates:   Allergen Reactions    Codeine Hives         Current Outpatient Medications:     buPROPion (WELLBUTRIN XL) 150 MG TB24 tablet, TAKE 1 TABLET BY MOUTH DAILY, Disp: 90 tablet, Rfl: 1    butalbital-acetaminophen-caffeine -40 mg (FIORICET, ESGIC) -40 mg per tablet, 1 tab po TID x 3 days,t hen 1 tab po bid x 2 days, then 1 tab daily x 1 day., Disp: 14 tablet, Rfl: 0    cetirizine (ZYRTEC) 10 MG tablet, Take 1 tablet (10 mg total) by mouth once daily., Disp: 30 tablet, Rfl: 2    gabapentin (NEURONTIN) 300 MG capsule, 1 capsule po qhs x 1 week, then 2 capsules po qhs, Disp: 60 capsule, Rfl: 6    meclizine (ANTIVERT) 25 mg tablet, Take 1 tablet (25 mg total) by mouth 3 (three) times daily as needed for Dizziness or Nausea., Disp: 30 tablet, Rfl: 2    naratriptan (AMERGE) 2.5 MG tablet, 2.5 mg at onset of headache, may repeat in 4 hours if needed. Max 2/day., Disp: 12 tablet, Rfl: 6    rosuvastatin (CRESTOR) 40 MG Tab, Take 1 tablet (40 mg total) by mouth every evening., Disp: 90 tablet, Rfl: 3    sumatriptan (IMITREX) 50 MG tablet, Take 1 tablet (50 mg total) by mouth every 2 (two) hours as needed for Migraine (Do not exceed 200 mg per 24 hours)., Disp: 9 tablet, Rfl: 0    topiramate (TOPAMAX) 25 MG tablet, Take 2 tablets (50 mg total) by mouth once daily., Disp: 30 tablet, Rfl: 0    topiramate (TROKENDI XR) 50 mg Cp24, Take 1 capsule by mouth daily x 1 week, then take 2 capsules by mouth daily and continue this dose., Disp: 60 capsule, Rfl: 11      PHYSICAL EXAMINATION  Vitals:    05/26/22 0837   BP: (!) 135/92   Pulse: 76   Resp: 17   Temp: 98.3 °F (36.8 °C)     General: The patient is a well-developed,  well-nourished looking of stated age in no acute distress.  NEUROLOGIC EXAM:  MENTAL: The patient is awake, alert and oriented times to time, place, location and situation. Intact recent memory, attention, concentration. Language and speech are normal. No aphasia, no dysarthria  CRANIAL NERVES:  Eomi, no facial asymmetry    MOTOR EXAM: shows normal strength ( 5/5 strength) in all 4 extremities  CEREBELLAR EXAM: no UE dysmetria   GAIT/STANCE:  Standard gait was normal with normal stride, arm swing and turning. No gait ataxia.        Impression:  Episodic migraine - she has had lifelong migraine headache, at least 10-12 years of chronic migraine with using Excedrin over 15 days a month, but after COVID 19 infection on 1/22 (breakthrough infection) she quickly had severe migraine during infection, despite recovering from other symptoms her headaches continue and have become daily constant with increase in the use of Excedrin with minimal relief. She had labs which did not reveal any abnormalities including cbc, cmp, tsh, vitamin b12, crp, esr. MRI brain and MRA head showed a Chiari malformation, which patient reports she was aware about from an MRI years ago, she has not Chiari symptoms currently. She had improved on Trokendi XR 100mg and gabapentin 600mg qhs which has improved down to 2 days per week for severe migraine, but kept daily low grade headache. She was tried on Aimovig 70 mg which caused severe constipation, then switched to Emgality. Emgality improved significantly her migraine, but unfortunately causes significant joint pains worse the first 3-4 days after injection, then would continue feels like arthritis/flu like symptoms, toward the end of the month she gets better as it wears off and has more migarine, then she injects again. At this point we will transition her off Emgality and triaal quilipta for management of migraine.  Discussed qulipta for prevention can have associated constipation. She  expressed understanding.     History of chronic migraine - Aimovig and emgality improved and now episodic migraine     Covid 19 infection long hanelly with chronic daily headache - over 3 months of this chronic daily headache and COVID 19 infection    Comorbidities:  Depression- well controlled  HL- well controlled   Obesity - she is working on healthier eating, trokendi might help with weight as well.   Family history of intracranial aneurysm - in MGM  from aSAH, MRA normal on     Plan:   1- For preventive management: A. Continue Trokendi XR 100mg daily           B. Continue gabapentin 600mg nightly           C. Discontinue Emgality 120mg       D. Start Qulipta 30mg daily for migraine prevention  in 1 week            2- Acute management: Almotriptan 12.5 mg at onse tof escalation of attack, can repeat after 4 hours. Max is 2/day, no more than 2-3 days a week or 10 days a month.     3-Keep track of headache     RTC in 2 months.         Evi Dunn MD   Board Certified Neurologist  [facility] Certified Headache Medicine

## 2022-09-05 RX ORDER — GABAPENTIN 300 MG/1
CAPSULE ORAL
Qty: 60 CAPSULE | Refills: 6 | Status: CANCELLED | OUTPATIENT
Start: 2022-09-05

## 2022-09-07 ENCOUNTER — TELEPHONE (OUTPATIENT)
Dept: PHARMACY | Facility: CLINIC | Age: 42
End: 2022-09-07
Payer: COMMERCIAL

## 2022-09-12 ENCOUNTER — OFFICE VISIT (OUTPATIENT)
Dept: FAMILY MEDICINE | Facility: CLINIC | Age: 42
End: 2022-09-12
Payer: COMMERCIAL

## 2022-09-12 VITALS
HEART RATE: 90 BPM | DIASTOLIC BLOOD PRESSURE: 86 MMHG | BODY MASS INDEX: 31.19 KG/M2 | SYSTOLIC BLOOD PRESSURE: 128 MMHG | RESPIRATION RATE: 16 BRPM | WEIGHT: 148.56 LBS | OXYGEN SATURATION: 98 % | TEMPERATURE: 98 F | HEIGHT: 58 IN

## 2022-09-12 DIAGNOSIS — J06.9 ACUTE URI: Primary | ICD-10-CM

## 2022-09-12 PROBLEM — Z78.9 ALTERATION IN INSTRUMENTAL ACTIVITIES OF DAILY LIVING (IADL): Status: RESOLVED | Noted: 2019-04-03 | Resolved: 2022-09-12

## 2022-09-12 PROBLEM — R53.1 WEAKNESS: Status: RESOLVED | Noted: 2019-04-03 | Resolved: 2022-09-12

## 2022-09-12 PROBLEM — Z90.710 S/P HYSTERECTOMY: Status: ACTIVE | Noted: 2018-10-13

## 2022-09-12 PROCEDURE — 1159F PR MEDICATION LIST DOCUMENTED IN MEDICAL RECORD: ICD-10-PCS | Mod: CPTII,S$GLB,, | Performed by: NURSE PRACTITIONER

## 2022-09-12 PROCEDURE — 3074F SYST BP LT 130 MM HG: CPT | Mod: CPTII,S$GLB,, | Performed by: NURSE PRACTITIONER

## 2022-09-12 PROCEDURE — 1159F MED LIST DOCD IN RCRD: CPT | Mod: CPTII,S$GLB,, | Performed by: NURSE PRACTITIONER

## 2022-09-12 PROCEDURE — 1160F RVW MEDS BY RX/DR IN RCRD: CPT | Mod: CPTII,S$GLB,, | Performed by: NURSE PRACTITIONER

## 2022-09-12 PROCEDURE — 3074F PR MOST RECENT SYSTOLIC BLOOD PRESSURE < 130 MM HG: ICD-10-PCS | Mod: CPTII,S$GLB,, | Performed by: NURSE PRACTITIONER

## 2022-09-12 PROCEDURE — 3008F PR BODY MASS INDEX (BMI) DOCUMENTED: ICD-10-PCS | Mod: CPTII,S$GLB,, | Performed by: NURSE PRACTITIONER

## 2022-09-12 PROCEDURE — U0005 INFEC AGEN DETEC AMPLI PROBE: HCPCS | Performed by: NURSE PRACTITIONER

## 2022-09-12 PROCEDURE — 99214 OFFICE O/P EST MOD 30 MIN: CPT | Mod: S$GLB,,, | Performed by: NURSE PRACTITIONER

## 2022-09-12 PROCEDURE — 3079F PR MOST RECENT DIASTOLIC BLOOD PRESSURE 80-89 MM HG: ICD-10-PCS | Mod: CPTII,S$GLB,, | Performed by: NURSE PRACTITIONER

## 2022-09-12 PROCEDURE — 99999 PR PBB SHADOW E&M-EST. PATIENT-LVL IV: ICD-10-PCS | Mod: PBBFAC,,, | Performed by: NURSE PRACTITIONER

## 2022-09-12 PROCEDURE — 99214 PR OFFICE/OUTPT VISIT, EST, LEVL IV, 30-39 MIN: ICD-10-PCS | Mod: S$GLB,,, | Performed by: NURSE PRACTITIONER

## 2022-09-12 PROCEDURE — 3079F DIAST BP 80-89 MM HG: CPT | Mod: CPTII,S$GLB,, | Performed by: NURSE PRACTITIONER

## 2022-09-12 PROCEDURE — 99999 PR PBB SHADOW E&M-EST. PATIENT-LVL IV: CPT | Mod: PBBFAC,,, | Performed by: NURSE PRACTITIONER

## 2022-09-12 PROCEDURE — U0003 INFECTIOUS AGENT DETECTION BY NUCLEIC ACID (DNA OR RNA); SEVERE ACUTE RESPIRATORY SYNDROME CORONAVIRUS 2 (SARS-COV-2) (CORONAVIRUS DISEASE [COVID-19]), AMPLIFIED PROBE TECHNIQUE, MAKING USE OF HIGH THROUGHPUT TECHNOLOGIES AS DESCRIBED BY CMS-2020-01-R: HCPCS | Performed by: NURSE PRACTITIONER

## 2022-09-12 PROCEDURE — 3008F BODY MASS INDEX DOCD: CPT | Mod: CPTII,S$GLB,, | Performed by: NURSE PRACTITIONER

## 2022-09-12 PROCEDURE — 1160F PR REVIEW ALL MEDS BY PRESCRIBER/CLIN PHARMACIST DOCUMENTED: ICD-10-PCS | Mod: CPTII,S$GLB,, | Performed by: NURSE PRACTITIONER

## 2022-09-12 RX ORDER — BROMPHENIRAMINE MALEATE, PSEUDOEPHEDRINE HYDROCHLORIDE, AND DEXTROMETHORPHAN HYDROBROMIDE 2; 30; 10 MG/5ML; MG/5ML; MG/5ML
5 SYRUP ORAL
Qty: 480 ML | Refills: 0 | Status: SHIPPED | OUTPATIENT
Start: 2022-09-12

## 2022-09-12 RX ORDER — COVID-19 MOLECULAR TEST ASSAY
KIT MISCELLANEOUS
COMMUNITY
Start: 2022-09-11 | End: 2022-09-12 | Stop reason: ALTCHOICE

## 2022-09-12 NOTE — LETTER
[email or web address]    September 12, 2022    Crystal Greer  [address] [place] [address]             [address] [facility] [address]  Phone: [phone]  Fax: [phone] Dear {MR/MRS/MS/DR:[id number]} Zoey:    ***      If you have any questions or concerns, please don't hesitate to call.    Sincerely,        Jessica Leigh NP

## 2022-09-12 NOTE — PROGRESS NOTES
Subjective:      Patient ID: Crystal Greer is a 42 y.o. female.  New to me but seen previously in clinic by a fellow provider. Pt presents to clinic with acute URI symptoms that began 2 days ago    URI   This is a new problem. The current episode started in the past 7 days. The problem has been gradually worsening. There has been no fever. Associated symptoms include congestion, coughing, headaches, a plugged ear sensation, rhinorrhea, sinus pain and a sore throat. Pertinent negatives include no abdominal pain, chest pain, diarrhea, dysuria, ear pain, joint pain, joint swelling, nausea, neck pain, rash, sneezing, swollen glands, vomiting or wheezing. She has tried antihistamine for the symptoms. The treatment provided no relief.   Review of Systems   Constitutional:  Positive for appetite change and fatigue. Negative for activity change, chills, diaphoresis, fever and unexpected weight change.   HENT:  Positive for nasal congestion, postnasal drip, rhinorrhea, sinus pressure/congestion, sore throat and voice change. Negative for ear pain, sneezing and trouble swallowing.    Respiratory:  Positive for cough. Negative for chest tightness, shortness of breath and wheezing.    Cardiovascular:  Negative for chest pain and palpitations.   Gastrointestinal:  Negative for abdominal pain, change in bowel habit, constipation, diarrhea, nausea, vomiting and change in bowel habit.   Genitourinary:  Negative for difficulty urinating, dysuria and menstrual problem.   Musculoskeletal:  Negative for arthralgias, back pain, gait problem, joint pain, myalgias and neck pain.   Integumentary:  Negative for rash.   Neurological:  Positive for headaches. Negative for dizziness, vertigo, tremors, seizures, syncope, facial asymmetry, speech difficulty, weakness, light-headedness, numbness, coordination difficulties, memory loss and coordination difficulties.   All other systems reviewed and are negative.      Objective:  "    Vitals:    09/12/22 1542   BP: 128/86   Pulse: 90   Resp: 16   Temp: 98.4 °F (36.9 °C)   TempSrc: Oral   SpO2: 98%   Weight: 67.4 kg (148 lb 9.4 oz)   Height: 4' 10" (1.473 m)     Physical Exam  Vitals and nursing note reviewed.   Constitutional:       General: She is not in acute distress.     Appearance: Normal appearance. She is well-developed, well-groomed and overweight. She is not ill-appearing.   HENT:      Head: Normocephalic and atraumatic.      Right Ear: Tympanic membrane, ear canal and external ear normal.      Left Ear: Tympanic membrane, ear canal and external ear normal.      Nose: Mucosal edema, congestion and rhinorrhea present.      Mouth/Throat:      Lips: Pink.      Mouth: Mucous membranes are moist.      Pharynx: Uvula midline. Pharyngeal swelling and posterior oropharyngeal erythema present. No oropharyngeal exudate or uvula swelling.   Eyes:      General: Lids are normal. Vision grossly intact. Gaze aligned appropriately.      Conjunctiva/sclera: Conjunctivae normal.      Pupils: Pupils are equal, round, and reactive to light.   Neck:      Trachea: Phonation normal.   Cardiovascular:      Rate and Rhythm: Normal rate and regular rhythm.      Heart sounds: Normal heart sounds.   Pulmonary:      Effort: Pulmonary effort is normal. No accessory muscle usage or respiratory distress.      Breath sounds: Normal breath sounds and air entry.   Abdominal:      General: Abdomen is flat. Bowel sounds are normal. There is no distension.      Palpations: Abdomen is soft.      Tenderness: There is no abdominal tenderness.   Musculoskeletal:      Cervical back: Neck supple.      Right lower leg: No edema.      Left lower leg: No edema.   Lymphadenopathy:      Cervical: No cervical adenopathy.   Skin:     General: Skin is warm and dry.      Findings: No rash.   Neurological:      General: No focal deficit present.      Mental Status: She is alert and oriented to person, place, and time. Mental status is " at baseline.      Sensory: Sensation is intact.      Motor: Motor function is intact.      Coordination: Coordination is intact.      Gait: Gait is intact.   Psychiatric:         Attention and Perception: Attention and perception normal.         Mood and Affect: Mood and affect normal.         Speech: Speech normal.         Behavior: Behavior normal. Behavior is cooperative.         Thought Content: Thought content normal.         Cognition and Memory: Cognition and memory normal.         Judgment: Judgment normal.     Assessment and Plan:     1. Acute URI  Symptomatic therapy suggested: rest, increase fluids, gargle prn for sore throat, apply heat to sinuses prn, use mist of vaporizer prn, OTC acetaminophen, ibuprofen, and call prn if symptoms persist or worsen. Call or return to clinic prn if these symptoms worsen or fail to improve as anticipated.  - COVID-19 Routine Screening  - brompheniramine-pseudoeph-DM (BROMFED DM) 2-30-10 mg/5 mL Syrp; Take 5 mLs by mouth every 4 to 6 hours as needed.  Dispense: 480 mL; Refill: 0        RAYMON Marte, FNP-C  Family/Internal Medicine  [facility]

## 2022-09-13 LAB — SARS-COV-2 RNA RESP QL NAA+PROBE: NOT DETECTED

## 2022-09-26 ENCOUNTER — E-VISIT (OUTPATIENT)
Dept: FAMILY MEDICINE | Facility: CLINIC | Age: 42
End: 2022-09-26
Payer: COMMERCIAL

## 2022-09-26 DIAGNOSIS — J40 BRONCHITIS: Primary | ICD-10-CM

## 2022-09-26 DIAGNOSIS — J20.9 ACUTE BRONCHITIS WITH SYMPTOMS GREATER THAN 10 DAYS: ICD-10-CM

## 2022-09-26 PROCEDURE — 99423 PR E&M, ONLINE DIGIT, EST, < 7 DAYS,  21+ MINS: ICD-10-PCS | Mod: S$GLB,,, | Performed by: NURSE PRACTITIONER

## 2022-09-26 PROCEDURE — 99423 OL DIG E/M SVC 21+ MIN: CPT | Mod: S$GLB,,, | Performed by: NURSE PRACTITIONER

## 2022-09-27 ENCOUNTER — PATIENT MESSAGE (OUTPATIENT)
Dept: FAMILY MEDICINE | Facility: CLINIC | Age: 42
End: 2022-09-27
Payer: COMMERCIAL

## 2022-09-27 RX ORDER — AZITHROMYCIN 250 MG/1
TABLET, FILM COATED ORAL
Qty: 6 TABLET | Refills: 0 | Status: SHIPPED | OUTPATIENT
Start: 2022-09-27 | End: 2022-10-02

## 2022-09-27 RX ORDER — PREDNISONE 20 MG/1
TABLET ORAL
Qty: 8 TABLET | Refills: 0 | Status: SHIPPED | OUTPATIENT
Start: 2022-09-27 | End: 2022-10-02

## 2022-09-27 RX ORDER — PROMETHAZINE HYDROCHLORIDE AND DEXTROMETHORPHAN HYDROBROMIDE 6.25; 15 MG/5ML; MG/5ML
5 SYRUP ORAL EVERY 4 HOURS PRN
Qty: 480 ML | Refills: 0 | Status: SHIPPED | OUTPATIENT
Start: 2022-09-27 | End: 2022-10-04 | Stop reason: ALTCHOICE

## 2022-09-27 NOTE — PROGRESS NOTES
Patient ID: Crystal Greer is a 42 y.o. female.    Chief Complaint: Cough    The patient initiated a request through [employer] on 9/26/2022 for evaluation and management with a chief complaint of Cough     I evaluated the questionnaire submission on 9/27/2022. Pt evaluated by me in clinic 9/12/22, e-visit today for worsening symptoms despite conservative treatment. Additional orders placed    [facility] Peq Evisit Upper Respitatory/Cough Questionnaire    9/26/2022  4:14 PM CDT - Filed by Patient   Do you agree to participate in an E-Visit? Yes   If you have any of the following symptoms, please present to your local ER or call 911:  I acknowledge   What is the main issue that you would like for your doctor to address today? Cough and shortness of breathe   Are you able to take your vital signs? No   What symptoms do you currently have?  Chills;  Cough;  Fatigue;  Headache;  Muscle or body aches   Have you had a fever? Yes   What has been the range of your fever? Less than 100.4   When did your symptoms first appear? 9/12/2022   In the last two weeks, have you been in close contact with someone who has COVID-19? No   In the last two weeks, have you worked or volunteered in a healthcare facility or as a ? Healthcare facilities include a hospital, medical or dental clinic, long-term care facility, or nursing home No   Do you live in a long-term care facility, nursing home, or homeless shelter? No   List what you have done or taken to help your symptoms. Taken cough syrup prescribed by doctor   How severe are your symptoms? Severe   Have you taken an at home Covid test? Yes   What were the results? Negative   Have you been fully vaccinated for COVID? (2 Pfizer, 2 Moderna or 1 Roberto & Roberto vaccine injections) Yes   Have you received a booster? Yes   Do you have transportation to get tested for COVID if it is indicated and ordered for you at an [facility] location? Yes   Provide any information you feel  is important to your history not asked above Martha visited the office 2 weeks ago and was tested for covid, it was negative.  My symptoms have gotten worse. My cough amd throat keeps me up at night   Please attach any relevant images or files         Active Problem List with Overview Notes    Diagnosis Date Noted    Migraine without aura and without status migrainosus, not intractable 03/31/2022    Mixed hyperlipidemia 06/15/2021    Degenerative disc disease, cervical 03/26/2019    S/P hysterectomy 10/13/2018      Recent Labs Obtained:  No visits with results within 7 Day(s) from this visit.   Latest known visit with results is:   Office Visit on 09/12/2022   Component Date Value Ref Range Status    SARS-CoV2 (COVID-19) Qualitative P* 09/12/2022 Not Detected  Not Detected Final    Comment: This test utilizes a real-time reverse transcription  polymerase chain reaction procedure to amplify and   detect the SARS-CoV-2 and detect the SARS-CoV-2 N2 and E nucleic  acid targets. The analytical sensitivity (limit of detection) of   this assay is 250 copies/mL.    A Detected result implies that the patient is infected with the  SARS-CoV-2 virus and is presumed to be contagious.    A Not Detected result implies that the SARS-CoV-2 target nucleic  acids are not present above the limit of detection.  It does not  rule out the possibility of COVID-19 and should not be the sole  basis for treatment decisions. If COVID-19 is strongly suspected  based on clinical and epidemiological history, re-testing should  be considered.    This test is only for use under Food and Drug   Administration s Emergency Use Authorization (EUA).   Commercial reagents are provided by [employer].  Performance characteristics of the EUA have been   independently verified by [facility]   Department o                           f Pathology and Laboratory Medicine.           Encounter Diagnoses   Name Primary?    Bronchitis Yes    Acute bronchitis  with symptoms greater than 10 days         No orders of the defined types were placed in this encounter.     Medications Ordered This Encounter   Medications    azithromycin (Z-ANABEL) 250 MG tablet     Sig: Take 2 tablets by mouth on day 1; Take 1 tablet by mouth on days 2-5     Dispense:  6 tablet     Refill:  0    predniSONE (DELTASONE) 20 MG tablet     Sig: Take 2 tablets (40 mg total) by mouth once daily for 3 days, THEN 1 tablet (20 mg total) once daily for 2 days.     Dispense:  8 tablet     Refill:  0    promethazine-dextromethorphan (PROMETHAZINE-DM) 6.25-15 mg/5 mL Syrp     Sig: Take 5 mLs by mouth every 4 (four) hours as needed (cough, congestion).     Dispense:  480 mL     Refill:  0        E-Visit Time Tracking:    Day 1 Time (in minutes): 25     Total Time (in minutes): 25

## 2022-10-04 ENCOUNTER — OFFICE VISIT (OUTPATIENT)
Dept: FAMILY MEDICINE | Facility: CLINIC | Age: 42
End: 2022-10-04
Payer: COMMERCIAL

## 2022-10-04 ENCOUNTER — LAB VISIT (OUTPATIENT)
Dept: LAB | Facility: HOSPITAL | Age: 42
End: 2022-10-04
Attending: INTERNAL MEDICINE
Payer: COMMERCIAL

## 2022-10-04 VITALS
WEIGHT: 143.06 LBS | BODY MASS INDEX: 30.03 KG/M2 | OXYGEN SATURATION: 97 % | HEART RATE: 81 BPM | DIASTOLIC BLOOD PRESSURE: 80 MMHG | SYSTOLIC BLOOD PRESSURE: 118 MMHG | TEMPERATURE: 98 F | HEIGHT: 58 IN

## 2022-10-04 DIAGNOSIS — Z00.00 ANNUAL PHYSICAL EXAM: ICD-10-CM

## 2022-10-04 DIAGNOSIS — Z00.00 ANNUAL PHYSICAL EXAM: Primary | ICD-10-CM

## 2022-10-04 LAB
ALBUMIN SERPL BCP-MCNC: 3.8 G/DL (ref 3.5–5.2)
ALP SERPL-CCNC: 48 U/L (ref 55–135)
ALT SERPL W/O P-5'-P-CCNC: 18 U/L (ref 10–44)
ANION GAP SERPL CALC-SCNC: 9 MMOL/L (ref 8–16)
AST SERPL-CCNC: 18 U/L (ref 10–40)
BASOPHILS # BLD AUTO: 0.03 K/UL (ref 0–0.2)
BASOPHILS NFR BLD: 0.4 % (ref 0–1.9)
BILIRUB SERPL-MCNC: 0.1 MG/DL (ref 0.1–1)
BUN SERPL-MCNC: 14 MG/DL (ref 6–20)
CALCIUM SERPL-MCNC: 8.9 MG/DL (ref 8.7–10.5)
CHLORIDE SERPL-SCNC: 110 MMOL/L (ref 95–110)
CHOLEST SERPL-MCNC: 135 MG/DL (ref 120–199)
CHOLEST/HDLC SERPL: 3.6 {RATIO} (ref 2–5)
CO2 SERPL-SCNC: 23 MMOL/L (ref 23–29)
CREAT SERPL-MCNC: 0.8 MG/DL (ref 0.5–1.4)
DIFFERENTIAL METHOD: NORMAL
EOSINOPHIL # BLD AUTO: 0.1 K/UL (ref 0–0.5)
EOSINOPHIL NFR BLD: 1.4 % (ref 0–8)
ERYTHROCYTE [DISTWIDTH] IN BLOOD BY AUTOMATED COUNT: 12.6 % (ref 11.5–14.5)
EST. GFR  (NO RACE VARIABLE): >60 ML/MIN/1.73 M^2
ESTIMATED AVG GLUCOSE: 103 MG/DL (ref 68–131)
GLUCOSE SERPL-MCNC: 103 MG/DL (ref 70–110)
HBA1C MFR BLD: 5.2 % (ref 4–5.6)
HCT VFR BLD AUTO: 39.1 % (ref 37–48.5)
HDLC SERPL-MCNC: 38 MG/DL (ref 40–75)
HDLC SERPL: 28.1 % (ref 20–50)
HGB BLD-MCNC: 12.6 G/DL (ref 12–16)
IMM GRANULOCYTES # BLD AUTO: 0.02 K/UL (ref 0–0.04)
IMM GRANULOCYTES NFR BLD AUTO: 0.2 % (ref 0–0.5)
LDLC SERPL CALC-MCNC: 73.4 MG/DL (ref 63–159)
LYMPHOCYTES # BLD AUTO: 2.7 K/UL (ref 1–4.8)
LYMPHOCYTES NFR BLD: 33.9 % (ref 18–48)
MCH RBC QN AUTO: 29.1 PG (ref 27–31)
MCHC RBC AUTO-ENTMCNC: 32.2 G/DL (ref 32–36)
MCV RBC AUTO: 90 FL (ref 82–98)
MONOCYTES # BLD AUTO: 0.5 K/UL (ref 0.3–1)
MONOCYTES NFR BLD: 5.6 % (ref 4–15)
NEUTROPHILS # BLD AUTO: 4.7 K/UL (ref 1.8–7.7)
NEUTROPHILS NFR BLD: 58.5 % (ref 38–73)
NONHDLC SERPL-MCNC: 97 MG/DL
NRBC BLD-RTO: 0 /100 WBC
PLATELET # BLD AUTO: 315 K/UL (ref 150–450)
PMV BLD AUTO: 10.5 FL (ref 9.2–12.9)
POTASSIUM SERPL-SCNC: 3.5 MMOL/L (ref 3.5–5.1)
PROT SERPL-MCNC: 6.8 G/DL (ref 6–8.4)
RBC # BLD AUTO: 4.33 M/UL (ref 4–5.4)
SODIUM SERPL-SCNC: 142 MMOL/L (ref 136–145)
TRIGL SERPL-MCNC: 118 MG/DL (ref 30–150)
WBC # BLD AUTO: 8.05 K/UL (ref 3.9–12.7)

## 2022-10-04 PROCEDURE — 1159F MED LIST DOCD IN RCRD: CPT | Mod: CPTII,S$GLB,, | Performed by: INTERNAL MEDICINE

## 2022-10-04 PROCEDURE — 99999 PR PBB SHADOW E&M-EST. PATIENT-LVL III: CPT | Mod: PBBFAC,,, | Performed by: INTERNAL MEDICINE

## 2022-10-04 PROCEDURE — 3008F BODY MASS INDEX DOCD: CPT | Mod: CPTII,S$GLB,, | Performed by: INTERNAL MEDICINE

## 2022-10-04 PROCEDURE — 1160F RVW MEDS BY RX/DR IN RCRD: CPT | Mod: CPTII,S$GLB,, | Performed by: INTERNAL MEDICINE

## 2022-10-04 PROCEDURE — 1160F PR REVIEW ALL MEDS BY PRESCRIBER/CLIN PHARMACIST DOCUMENTED: ICD-10-PCS | Mod: CPTII,S$GLB,, | Performed by: INTERNAL MEDICINE

## 2022-10-04 PROCEDURE — 3008F PR BODY MASS INDEX (BMI) DOCUMENTED: ICD-10-PCS | Mod: CPTII,S$GLB,, | Performed by: INTERNAL MEDICINE

## 2022-10-04 PROCEDURE — 99396 PREV VISIT EST AGE 40-64: CPT | Mod: S$GLB,,, | Performed by: INTERNAL MEDICINE

## 2022-10-04 PROCEDURE — 83036 HEMOGLOBIN GLYCOSYLATED A1C: CPT | Performed by: INTERNAL MEDICINE

## 2022-10-04 PROCEDURE — 80053 COMPREHEN METABOLIC PANEL: CPT | Performed by: INTERNAL MEDICINE

## 2022-10-04 PROCEDURE — 80061 LIPID PANEL: CPT | Performed by: INTERNAL MEDICINE

## 2022-10-04 PROCEDURE — 85025 COMPLETE CBC W/AUTO DIFF WBC: CPT | Performed by: INTERNAL MEDICINE

## 2022-10-04 PROCEDURE — 3074F SYST BP LT 130 MM HG: CPT | Mod: CPTII,S$GLB,, | Performed by: INTERNAL MEDICINE

## 2022-10-04 PROCEDURE — 1159F PR MEDICATION LIST DOCUMENTED IN MEDICAL RECORD: ICD-10-PCS | Mod: CPTII,S$GLB,, | Performed by: INTERNAL MEDICINE

## 2022-10-04 PROCEDURE — 36415 COLL VENOUS BLD VENIPUNCTURE: CPT | Mod: PO | Performed by: INTERNAL MEDICINE

## 2022-10-04 PROCEDURE — 99396 PR PREVENTIVE VISIT,EST,40-64: ICD-10-PCS | Mod: S$GLB,,, | Performed by: INTERNAL MEDICINE

## 2022-10-04 PROCEDURE — 3079F DIAST BP 80-89 MM HG: CPT | Mod: CPTII,S$GLB,, | Performed by: INTERNAL MEDICINE

## 2022-10-04 PROCEDURE — 99999 PR PBB SHADOW E&M-EST. PATIENT-LVL III: ICD-10-PCS | Mod: PBBFAC,,, | Performed by: INTERNAL MEDICINE

## 2022-10-04 PROCEDURE — 3079F PR MOST RECENT DIASTOLIC BLOOD PRESSURE 80-89 MM HG: ICD-10-PCS | Mod: CPTII,S$GLB,, | Performed by: INTERNAL MEDICINE

## 2022-10-04 PROCEDURE — 3074F PR MOST RECENT SYSTOLIC BLOOD PRESSURE < 130 MM HG: ICD-10-PCS | Mod: CPTII,S$GLB,, | Performed by: INTERNAL MEDICINE

## 2022-10-04 NOTE — PROGRESS NOTES
SUBJECTIVE     No chief complaint on file.      HPI  Crystal Greer is a 42 y.o. female with multiple medical diagnoses as listed in the medical history and problem list that presents for annual exam. Pt has been doing okay since her last visit, but did recently have an URI. She has an okay appetite as her current meds for migraines decrease her appetite. She does exercise by walking several times per week. She sleeps for ~7 hours nightly. Pt does take OTC supplements, which is a MVI. She does not have any current stressors. Pt is UTD on age appropriate CA screening.    PAST MEDICAL HISTORY:  Past Medical History:   Diagnosis Date    Headache        PAST SURGICAL HISTORY:  Past Surgical History:   Procedure Laterality Date     SECTION      HYSTERECTOMY      TOTAL REDUCTION MAMMOPLASTY      TUBAL LIGATION         SOCIAL HISTORY:  Social History     Socioeconomic History    Marital status: Single   Tobacco Use    Smoking status: Never    Smokeless tobacco: Never   Substance and Sexual Activity    Alcohol use: Yes    Drug use: Never    Sexual activity: Yes     Partners: Male       FAMILY HISTORY:  Family History   Problem Relation Age of Onset    Diabetes Maternal Grandmother     Stroke Maternal Grandmother     No Known Problems Mother     No Known Problems Father        ALLERGIES AND MEDICATIONS: updated and reviewed.  Review of patient's allergies indicates:   Allergen Reactions    Codeine Hives     Current Outpatient Medications   Medication Sig Dispense Refill    atogepant (QULIPTA) 30 mg Tab Take 1 tablet (30 mg) by mouth once daily. 30 tablet 11    brompheniramine-pseudoeph-DM (BROMFED DM) 2-30-10 mg/5 mL Syrp Take 5 mLs by mouth every 4 to 6 hours as needed. 480 mL 0    buPROPion (WELLBUTRIN XL) 150 MG TB24 tablet Take 1 tablet (150 mg total) by mouth once daily. 90 tablet 1    butalbital-acetaminophen-caffeine -40 mg (FIORICET, ESGIC) -40 mg per tablet 1 tab po TID x 3 days,t hen 1  "tab po bid x 2 days, then 1 tab daily x 1 day. 14 tablet 0    cetirizine (ZYRTEC) 10 MG tablet Take 1 tablet (10 mg total) by mouth once daily. 30 tablet 2    gabapentin (NEURONTIN) 300 MG capsule 1 capsule po qhs x 1 week, then 2 capsules po qhs 60 capsule 6    rosuvastatin (CRESTOR) 40 MG Tab Take 1 tablet (40 mg total) by mouth every evening. 90 tablet 3    topiramate (TROKENDI XR) 50 mg Cp24 Take 1 capsule by mouth daily x 1 week, then take 2 capsules by mouth daily and continue this dose. 60 capsule 11     No current facility-administered medications for this visit.       ROS  Review of Systems   Constitutional:  Negative for chills and fever.   HENT:  Negative for hearing loss and sore throat.    Eyes:  Negative for visual disturbance.   Respiratory:  Negative for cough and shortness of breath.    Cardiovascular:  Negative for chest pain, palpitations and leg swelling.   Gastrointestinal:  Negative for abdominal pain, constipation, diarrhea, nausea and vomiting.   Genitourinary:  Negative for dysuria, frequency and urgency.   Musculoskeletal:  Negative for arthralgias, joint swelling and myalgias.   Skin:  Negative for rash and wound.   Neurological:  Negative for headaches.   Psychiatric/Behavioral:  Negative for agitation and confusion. The patient is not nervous/anxious.        OBJECTIVE     Physical Exam  Vitals:    10/04/22 0811   BP: 118/80   Pulse: 81   Temp: 98.2 °F (36.8 °C)    Body mass index is 29.9 kg/m².  Weight: 64.9 kg (143 lb 1.3 oz)   Height: 4' 10" (147.3 cm)     Physical Exam  Constitutional:       General: She is not in acute distress.     Appearance: She is well-developed.   HENT:      Head: Normocephalic and atraumatic.      Right Ear: Tympanic membrane, ear canal and external ear normal.      Left Ear: Tympanic membrane, ear canal and external ear normal.      Nose: Nose normal.   Eyes:      General: No scleral icterus.        Right eye: No discharge.         Left eye: No discharge.      " Conjunctiva/sclera: Conjunctivae normal.      Pupils: Pupils are equal, round, and reactive to light.   Neck:      Vascular: No JVD.      Trachea: No tracheal deviation.   Cardiovascular:      Rate and Rhythm: Normal rate and regular rhythm.      Heart sounds: No murmur heard.    No friction rub. No gallop.   Pulmonary:      Effort: Pulmonary effort is normal. No respiratory distress.      Breath sounds: Normal breath sounds. No wheezing.   Abdominal:      General: Bowel sounds are normal. There is no distension.      Palpations: Abdomen is soft. There is no mass.      Tenderness: There is no abdominal tenderness. There is no guarding or rebound.   Musculoskeletal:         General: No tenderness or deformity. Normal range of motion.      Cervical back: Normal range of motion and neck supple.   Skin:     General: Skin is warm and dry.      Findings: No erythema or rash.   Neurological:      Mental Status: She is alert and oriented to person, place, and time.      Motor: No abnormal muscle tone.      Coordination: Coordination normal.   Psychiatric:         Behavior: Behavior normal.         Thought Content: Thought content normal.         Judgment: Judgment normal.         Health Maintenance         Date Due Completion Date    Pneumococcal Vaccines (Age 0-64) (1 - PCV) Never done ---    TETANUS VACCINE Never done ---    COVID-19 Vaccine (4 - Booster for Pfizer series) 03/22/2022 1/25/2022    Influenza Vaccine (1) Never done ---    Mammogram 07/07/2023 7/7/2022              ASSESSMENT     42 y.o. female with     1. Annual physical exam        PLAN:     1. Annual physical exam  - Counseled on age appropriate medical preventative services, including age appropriate cancer screenings, over all nutritional health, need for a consistent exercise regimen and an over all push towards maintaining a vigorous and active lifestyle.  Counseled on age appropriate vaccines and discussed upcoming health care needs based on  age/gender.  Spent time with patient counseling on need for a good patient/doctor relationship moving forward.  Discussed use of common OTC medications and supplements.  Discussed common dietary aids and use of caffeine and the need for good sleep hygiene and stress management.  - CBC Auto Differential; Future  - Comprehensive Metabolic Panel; Future  - Hemoglobin A1C; Future  - Lipid Panel; Future      RTC in 6 months     Isha Espinal MD  10/04/2022 8:27 AM        No follow-ups on file.

## 2022-11-09 ENCOUNTER — IMMUNIZATION (OUTPATIENT)
Dept: FAMILY MEDICINE | Facility: CLINIC | Age: 42
End: 2022-11-09
Payer: COMMERCIAL

## 2022-11-09 DIAGNOSIS — Z23 NEEDS FLU SHOT: Primary | ICD-10-CM

## 2022-11-09 PROCEDURE — 90471 IMMUNIZATION ADMIN: CPT | Mod: S$GLB,,, | Performed by: INTERNAL MEDICINE

## 2022-11-09 PROCEDURE — 90471 FLU VACCINE (QUAD) GREATER THAN OR EQUAL TO 3YO PRESERVATIVE FREE IM: ICD-10-PCS | Mod: S$GLB,,, | Performed by: INTERNAL MEDICINE

## 2022-11-09 PROCEDURE — 90686 FLU VACCINE (QUAD) GREATER THAN OR EQUAL TO 3YO PRESERVATIVE FREE IM: ICD-10-PCS | Mod: S$GLB,,, | Performed by: INTERNAL MEDICINE

## 2022-11-09 PROCEDURE — 90686 IIV4 VACC NO PRSV 0.5 ML IM: CPT | Mod: S$GLB,,, | Performed by: INTERNAL MEDICINE

## 2022-11-09 NOTE — PROGRESS NOTES
Administered Influenza Vaccine 0.5mL IM to right deltoid. No s/s of any adverse reaction noted.

## 2022-11-29 ENCOUNTER — IMMUNIZATION (OUTPATIENT)
Dept: OBSTETRICS AND GYNECOLOGY | Facility: CLINIC | Age: 42
End: 2022-11-29
Payer: COMMERCIAL

## 2022-11-29 DIAGNOSIS — Z23 NEED FOR VACCINATION: Primary | ICD-10-CM

## 2022-11-29 PROCEDURE — 91312 COVID-19, MRNA, LNP-S, BIVALENT BOOSTER, PF, 30 MCG/0.3 ML DOSE: ICD-10-PCS | Mod: S$GLB,,, | Performed by: FAMILY MEDICINE

## 2022-11-29 PROCEDURE — 91312 COVID-19, MRNA, LNP-S, BIVALENT BOOSTER, PF, 30 MCG/0.3 ML DOSE: CPT | Mod: S$GLB,,, | Performed by: FAMILY MEDICINE

## 2022-11-29 PROCEDURE — 0124A COVID-19, MRNA, LNP-S, BIVALENT BOOSTER, PF, 30 MCG/0.3 ML DOSE: CPT | Mod: PBBFAC | Performed by: FAMILY MEDICINE

## 2022-12-12 ENCOUNTER — TELEPHONE (OUTPATIENT)
Dept: FAMILY MEDICINE | Facility: CLINIC | Age: 42
End: 2022-12-12

## 2022-12-12 DIAGNOSIS — Z23 NEED FOR DIPHTHERIA-TETANUS-PERTUSSIS (TDAP) VACCINE: Primary | ICD-10-CM

## 2022-12-12 NOTE — TELEPHONE ENCOUNTER
----- Message from Janeen Paige sent at 12/12/2022 10:03 AM CST -----  Type: Patient Call Back    Who called: Self     What is the request in detail: Trying to get scheduled for a whooping cough vaccine     Can the clinic reply by [facility]? No     Would the patient rather a call back or a response via [facility]? Call     Best call back number: [phone]

## 2022-12-13 NOTE — TELEPHONE ENCOUNTER
Tdap ordered.  Please inform patient that she should check with her insurance 1st to ensure it is covered.  Otherwise, she can go to her local pharmacy or local [facility] for a cheaper vaccine.

## 2022-12-15 ENCOUNTER — TELEPHONE (OUTPATIENT)
Dept: FAMILY MEDICINE | Facility: CLINIC | Age: 42
End: 2022-12-15
Payer: COMMERCIAL

## 2022-12-15 NOTE — TELEPHONE ENCOUNTER
----- Message from Alexus Colon sent at 12/15/2022 10:58 AM CST -----  Regarding: Returning Call  .Type:  Patient Returning Call    Who Called:  self     Who Left Message for Patient:  Shea     Does the patient know what this is regarding?: previous message     Would the patient rather a call back or a response via [facility]? Call     Best Call Back Number: [phone]

## 2022-12-15 NOTE — TELEPHONE ENCOUNTER
----- Message from Tyesha Mason sent at 12/14/2022  2:57 PM CST -----  Regarding: Return Call  .Type:  Patient Returning Call    Who Called: self     Who Left Message for Patient:  FABI    Does the patient know what this is regarding?: Shot     Would the patient rather a call back or a response via [facility]? Call     Best Call Back Number: [phone]

## 2022-12-22 ENCOUNTER — PATIENT MESSAGE (OUTPATIENT)
Dept: FAMILY MEDICINE | Facility: CLINIC | Age: 42
End: 2022-12-22

## 2022-12-22 ENCOUNTER — E-VISIT (OUTPATIENT)
Dept: FAMILY MEDICINE | Facility: CLINIC | Age: 42
End: 2022-12-22
Payer: COMMERCIAL

## 2022-12-22 DIAGNOSIS — R68.89 FLU-LIKE SYMPTOMS: Primary | ICD-10-CM

## 2022-12-22 PROCEDURE — 99421 OL DIG E/M SVC 5-10 MIN: CPT | Mod: S$GLB,,, | Performed by: INTERNAL MEDICINE

## 2022-12-22 PROCEDURE — 99421 PR E&M, ONLINE DIGIT, EST, < 7 DAYS, 5-10 MINS: ICD-10-PCS | Mod: S$GLB,,, | Performed by: INTERNAL MEDICINE

## 2022-12-22 RX ORDER — PROMETHAZINE HYDROCHLORIDE AND DEXTROMETHORPHAN HYDROBROMIDE 6.25; 15 MG/5ML; MG/5ML
5 SYRUP ORAL 2 TIMES DAILY PRN
Qty: 120 ML | Refills: 0 | Status: SHIPPED | OUTPATIENT
Start: 2022-12-22 | End: 2023-01-01

## 2022-12-22 RX ORDER — OSELTAMIVIR PHOSPHATE 75 MG/1
75 CAPSULE ORAL 2 TIMES DAILY
Qty: 10 CAPSULE | Refills: 0 | Status: SHIPPED | OUTPATIENT
Start: 2022-12-22 | End: 2022-12-27

## 2022-12-22 NOTE — PROGRESS NOTES
Patient ID: Crystal Greer is a 42 y.o. female.    Chief Complaint: Generalized Body Aches, Cough, Fatigue, Headache, and Chills    The patient initiated a request through [employer] on 12/22/2022 for evaluation and management with a chief complaint of Generalized Body Aches, Cough, Fatigue, Headache, and Chills     I evaluated the questionnaire submission on 12/22/2022.    [facility] Peq Evisit Upper Respitatory/Cough Questionnaire    12/22/2022  8:36 AM CST - Filed by Patient   Do you agree to participate in an E-Visit? Yes   If you have any of the following symptoms, please present to your local ER or call 911:  I acknowledge   What is the main issue that you would like for your doctor to address today? Bad cough, achy body and sore throat   Are you able to take your vital signs? No   What symptoms do you currently have?  Cough;  Fatigue;  Headache;  Nasal Congestion;  Muscle or body aches;  Runny nose;  Sore throat   Have you had a fever? No   When did your symptoms first appear? 12/19/2022   In the last two weeks, have you been in close contact with someone who has COVID-19 or the Flu? No   In the last two weeks, have you worked or volunteered in a healthcare facility or as a ? Healthcare facilities include a hospital, medical or dental clinic, long-term care facility, or nursing home No   Do you live in a long-term care facility, nursing home, or homeless shelter? No   List what you have done or taken to help your symptoms. Rest, hot tea, cough syrup   How severe are your symptoms? Moderate   Have you taken an at home Covid test? Yes   What were the results? Negative   Have you taken a Flu test? No   Have you been fully vaccinated for COVID? (2 Pfizer, 2 Moderna or 1 Roberto & Roberto vaccine injections) Yes   Have you received a booster? Yes   Have you recieved a Flu shot? Yes   When did you recieve your Flu shot? 11/29/2022   Do you have transportation to get tested for COVID if it is indicated  and ordered for you at an [facility] location? Yes   Are you currently pregnant, could you be pregnant, or are you breast feeding? None of the above   Provide any information you feel is important to your history not asked above    Please attach any relevant images or files           Active Problem List with Overview Notes    Diagnosis Date Noted    Migraine without aura and without status migrainosus, not intractable 03/31/2022    Mixed hyperlipidemia 06/15/2021    Degenerative disc disease, cervical 03/26/2019    S/P hysterectomy 10/13/2018      Recent Labs Obtained:  No visits with results within 7 Day(s) from this visit.   Latest known visit with results is:   Lab Visit on 10/04/2022   Component Date Value Ref Range Status    WBC 10/04/2022 8.05  3.90 - 12.70 K/uL Final    RBC 10/04/2022 4.33  4.00 - 5.40 M/uL Final    Hemoglobin 10/04/2022 12.6  12.0 - 16.0 g/dL Final    Hematocrit 10/04/2022 39.1  37.0 - 48.5 % Final    MCV 10/04/2022 90  82 - 98 fL Final    MCH 10/04/2022 29.1  27.0 - 31.0 pg Final    MCHC 10/04/2022 32.2  32.0 - 36.0 g/dL Final    RDW 10/04/2022 12.6  11.5 - 14.5 % Final    Platelets 10/04/2022 315  150 - 450 K/uL Final    MPV 10/04/2022 10.5  9.2 - 12.9 fL Final    Immature Granulocytes 10/04/2022 0.2  0.0 - 0.5 % Final    Gran # (ANC) 10/04/2022 4.7  1.8 - 7.7 K/uL Final    Immature Grans (Abs) 10/04/2022 0.02  0.00 - 0.04 K/uL Final    Comment: Mild elevation in immature granulocytes is non specific and   can be seen in a variety of conditions including stress response,   acute inflammation, trauma and pregnancy. Correlation with other   laboratory and clinical findings is essential.      Lymph # 10/04/2022 2.7  1.0 - 4.8 K/uL Final    Mono # 10/04/2022 0.5  0.3 - 1.0 K/uL Final    Eos # 10/04/2022 0.1  0.0 - 0.5 K/uL Final    Baso # 10/04/2022 0.03  0.00 - 0.20 K/uL Final    nRBC 10/04/2022 0  0 /100 WBC Final    Gran % 10/04/2022 58.5  38.0 - 73.0 % Final    Lymph % 10/04/2022 33.9  18.0 -  48.0 % Final    Mono % 10/04/2022 5.6  4.0 - 15.0 % Final    Eosinophil % 10/04/2022 1.4  0.0 - 8.0 % Final    Basophil % 10/04/2022 0.4  0.0 - 1.9 % Final    Differential Method 10/04/2022 Automated   Final    Sodium 10/04/2022 142  136 - 145 mmol/L Final    Potassium 10/04/2022 3.5  3.5 - 5.1 mmol/L Final    Chloride 10/04/2022 110  95 - 110 mmol/L Final    CO2 10/04/2022 23  23 - 29 mmol/L Final    Glucose 10/04/2022 103  70 - 110 mg/dL Final    BUN 10/04/2022 14  6 - 20 mg/dL Final    Creatinine 10/04/2022 0.8  0.5 - 1.4 mg/dL Final    Calcium 10/04/2022 8.9  8.7 - 10.5 mg/dL Final    Total Protein 10/04/2022 6.8  6.0 - 8.4 g/dL Final    Albumin 10/04/2022 3.8  3.5 - 5.2 g/dL Final    Total Bilirubin 10/04/2022 0.1  0.1 - 1.0 mg/dL Final    Comment: For infants and newborns, interpretation of results should be based  on gestational age, weight and in agreement with clinical  observations.    Premature Infant recommended reference ranges:  Up to 24 hours.............<8.0 mg/dL  Up to 48 hours............<12.0 mg/dL  3-5 days..................<15.0 mg/dL  6-29 days.................<15.0 mg/dL      Alkaline Phosphatase 10/04/2022 48 (L)  55 - 135 U/L Final    AST 10/04/2022 18  10 - 40 U/L Final    ALT 10/04/2022 18  10 - 44 U/L Final    Anion Gap 10/04/2022 9  8 - 16 mmol/L Final    eGFR 10/04/2022 >60  >60 mL/min/1.73 m^2 Final    Hemoglobin A1C 10/04/2022 5.2  4.0 - 5.6 % Final    Comment: ADA Screening Guidelines:  5.7-6.4%  Consistent with prediabetes  >or=6.5%  Consistent with diabetes    High levels of fetal hemoglobin interfere with the HbA1C  assay. Heterozygous hemoglobin variants (HbS, HgC, etc)do  not significantly interfere with this assay.   However, presence of multiple variants may affect accuracy.      Estimated Avg Glucose 10/04/2022 103  68 - 131 mg/dL Final    Cholesterol 10/04/2022 135  120 - 199 mg/dL Final    Comment: The National Cholesterol Education Program (NCEP) has set the  following  guidelines (reference ranges) for Cholesterol:  Optimal.....................<200 mg/dL  Borderline High.............200-239 mg/dL  High........................> or = 240 mg/dL      Triglycerides 10/04/2022 118  30 - 150 mg/dL Final    Comment: The National Cholesterol Education Program (NCEP) has set the  following guidelines (reference values) for triglycerides:  Normal......................<150 mg/dL  Borderline High.............150-199 mg/dL  High........................200-499 mg/dL      HDL 10/04/2022 38 (L)  40 - 75 mg/dL Final    Comment: The National Cholesterol Education Program (NCEP) has set the  following guidelines (reference values) for HDL Cholesterol:  Low...............<40 mg/dL  Optimal...........>60 mg/dL      LDL Cholesterol 10/04/2022 73.4  63.0 - 159.0 mg/dL Final    Comment: The National Cholesterol Education Program (NCEP) has set the  following guidelines (reference values) for LDL Cholesterol:  Optimal.......................<130 mg/dL  Borderline High...............130-159 mg/dL  High..........................160-189 mg/dL  Very High.....................>190 mg/dL      HDL/Cholesterol Ratio 10/04/2022 28.1  20.0 - 50.0 % Final    Total Cholesterol/HDL Ratio 10/04/2022 3.6  2.0 - 5.0 Final    Non-HDL Cholesterol 10/04/2022 97  mg/dL Final    Comment: Risk category and Non-HDL cholesterol goals:  Coronary heart disease (CHD)or equivalent (10-year risk of CHD >20%):  Non-HDL cholesterol goal     <130 mg/dL  Two or more CHD risk factors and 10-year risk of CHD <= 20%:  Non-HDL cholesterol goal     <160 mg/dL  0 to 1 CHD risk factor:  Non-HDL cholesterol goal     <190 mg/dL         Encounter Diagnosis   Name Primary?    Flu-like symptoms Yes        No orders of the defined types were placed in this encounter.     Medications Ordered This Encounter   Medications    oseltamivir (TAMIFLU) 75 MG capsule     Sig: Take 1 capsule (75 mg total) by mouth 2 (two) times daily. for 5 days     Dispense:  10  capsule     Refill:  0    promethazine-dextromethorphan (PROMETHAZINE-DM) 6.25-15 mg/5 mL Syrp     Sig: Take 5 mLs by mouth 2 (two) times daily as needed (cough).     Dispense:  120 mL     Refill:  0        E-Visit Time Tracking:    Day 1 Time (in minutes): 7     Total Time (in minutes): 7

## 2022-12-28 DIAGNOSIS — Z23 NEED FOR PNEUMOCOCCAL VACCINATION: Primary | ICD-10-CM

## 2022-12-29 ENCOUNTER — CLINICAL SUPPORT (OUTPATIENT)
Dept: FAMILY MEDICINE | Facility: CLINIC | Age: 42
End: 2022-12-29
Payer: COMMERCIAL

## 2022-12-29 PROCEDURE — 90471 PNEUMOCOCCAL CONJUGATE VACCINE 20-VALENT: ICD-10-PCS | Mod: S$GLB,,, | Performed by: INTERNAL MEDICINE

## 2022-12-29 PROCEDURE — 90677 PCV20 VACCINE IM: CPT | Mod: S$GLB,,, | Performed by: INTERNAL MEDICINE

## 2022-12-29 PROCEDURE — 90471 IMMUNIZATION ADMIN: CPT | Mod: S$GLB,,, | Performed by: INTERNAL MEDICINE

## 2022-12-29 PROCEDURE — 90677 PNEUMOCOCCAL CONJUGATE VACCINE 20-VALENT: ICD-10-PCS | Mod: S$GLB,,, | Performed by: INTERNAL MEDICINE

## 2023-01-03 DIAGNOSIS — F32.9 REACTIVE DEPRESSION: ICD-10-CM

## 2023-01-03 DIAGNOSIS — E78.1 PURE HYPERTRIGLYCERIDEMIA: ICD-10-CM

## 2023-01-03 RX ORDER — BUPROPION HYDROCHLORIDE 150 MG/1
150 TABLET ORAL DAILY
Qty: 90 TABLET | Refills: 1 | Status: SHIPPED | OUTPATIENT
Start: 2023-01-03 | End: 2023-10-30 | Stop reason: SDUPTHER

## 2023-01-03 RX ORDER — ROSUVASTATIN CALCIUM 40 MG/1
40 TABLET, COATED ORAL NIGHTLY
Qty: 90 TABLET | Refills: 2 | Status: SHIPPED | OUTPATIENT
Start: 2023-01-03 | End: 2023-05-12 | Stop reason: SDUPTHER

## 2023-02-02 ENCOUNTER — OFFICE VISIT (OUTPATIENT)
Dept: OBSTETRICS AND GYNECOLOGY | Facility: CLINIC | Age: 43
End: 2023-02-02
Payer: COMMERCIAL

## 2023-02-02 VITALS
DIASTOLIC BLOOD PRESSURE: 74 MMHG | WEIGHT: 144.63 LBS | SYSTOLIC BLOOD PRESSURE: 116 MMHG | BODY MASS INDEX: 30.23 KG/M2

## 2023-02-02 DIAGNOSIS — Z01.419 ENCOUNTER FOR GYNECOLOGICAL EXAMINATION WITHOUT ABNORMAL FINDING: Primary | ICD-10-CM

## 2023-02-02 DIAGNOSIS — R10.2 PELVIC PAIN: ICD-10-CM

## 2023-02-02 DIAGNOSIS — Z12.31 BREAST CANCER SCREENING BY MAMMOGRAM: ICD-10-CM

## 2023-02-02 PROCEDURE — 99999 PR PBB SHADOW E&M-EST. PATIENT-LVL III: ICD-10-PCS | Mod: PBBFAC,,,

## 2023-02-02 PROCEDURE — 3074F SYST BP LT 130 MM HG: CPT | Mod: CPTII,S$GLB,,

## 2023-02-02 PROCEDURE — 3078F DIAST BP <80 MM HG: CPT | Mod: CPTII,S$GLB,,

## 2023-02-02 PROCEDURE — 99999 PR PBB SHADOW E&M-EST. PATIENT-LVL III: CPT | Mod: PBBFAC,,,

## 2023-02-02 PROCEDURE — 99396 PREV VISIT EST AGE 40-64: CPT | Mod: S$GLB,,,

## 2023-02-02 PROCEDURE — 1159F MED LIST DOCD IN RCRD: CPT | Mod: CPTII,S$GLB,,

## 2023-02-02 PROCEDURE — 99396 PR PREVENTIVE VISIT,EST,40-64: ICD-10-PCS | Mod: S$GLB,,,

## 2023-02-02 PROCEDURE — 1159F PR MEDICATION LIST DOCUMENTED IN MEDICAL RECORD: ICD-10-PCS | Mod: CPTII,S$GLB,,

## 2023-02-02 PROCEDURE — 3078F PR MOST RECENT DIASTOLIC BLOOD PRESSURE < 80 MM HG: ICD-10-PCS | Mod: CPTII,S$GLB,,

## 2023-02-02 PROCEDURE — 3074F PR MOST RECENT SYSTOLIC BLOOD PRESSURE < 130 MM HG: ICD-10-PCS | Mod: CPTII,S$GLB,,

## 2023-02-02 PROCEDURE — 3008F PR BODY MASS INDEX (BMI) DOCUMENTED: ICD-10-PCS | Mod: CPTII,S$GLB,,

## 2023-02-02 PROCEDURE — 3008F BODY MASS INDEX DOCD: CPT | Mod: CPTII,S$GLB,,

## 2023-02-02 NOTE — PROGRESS NOTES
Subjective:       Patient ID: Crystal Greer is a 42 y.o. female.    Chief Complaint:  Well Woman and Pelvic Discomfort      History of Present Illness  HPI  Annual Exam-Postmenopausal  Patient presents for annual exam. The patient has complaints today of left pelvic discomfort. States it comes and goes and is like a throbbing sensation. She reports a history of ovarian cyst. She would like them to be reevaluated. The patient is sexually active. GYN screening history: last pap: was normal and last mammogram: approximate date 2022 and was normal. The patient is not taking hormone replacement therapy. Patient denies post-menopausal vaginal bleeding. The patient wears seatbelts: yes. The patient participates in regular exercise: yes. Has the patient ever been transfused or tattooed?: no. The patient reports that there is not domestic violence in her life.    Declines STD screening    Ultrasound Results  2019: Cyst 3.8 x 3.6 x 3.5 adjacent to left ovary  2019 Cyst with septation adjacent to right ovary 5.6 x 2.4 x 3.8  GYN & OB History  Patient's last menstrual period was 2018.   Date of Last Pap: No result found    OB History    Para Term  AB Living   4 3 3 0 1 3   SAB IAB Ectopic Multiple Live Births   1 0 0 0 3      # Outcome Date GA Lbr Jeovany/2nd Weight Sex Delivery Anes PTL Lv   4 Term            3 Term            2 Term            1 SAB                Review of Systems  Review of Systems   Constitutional:  Negative for activity change and appetite change.   Respiratory:  Negative for shortness of breath.    Gastrointestinal:  Negative for abdominal pain.   Genitourinary:  Positive for pelvic pain. Negative for dysuria, flank pain, vaginal bleeding and postmenopausal bleeding.   Musculoskeletal:  Negative for myalgias.   Integumentary:  Negative for breast skin changes. Negative.   Neurological: Negative.  Negative for headaches.   Hematological: Negative.    Breast: negative.   Negative for asymmetry, breast self exam, lump and skin changes        Objective:    Physical Exam:   Constitutional: She is oriented to person, place, and time. She appears well-nourished.    HENT:   Head: Normocephalic and atraumatic.    Eyes: EOM are normal. Right eye exhibits normal extraocular motion. Left eye exhibits normal extraocular motion.    Neck: No thyromegaly present.    Cardiovascular:  Normal rate.             Pulmonary/Chest: Effort normal. No respiratory distress. Right breast exhibits no mass, no skin change and no tenderness. Left breast exhibits no mass, no skin change and no tenderness. Breasts are symmetrical.        Abdominal: Soft. She exhibits no distension and no mass. There is no abdominal tenderness. Hernia confirmed negative in the right inguinal area and confirmed negative in the left inguinal area.     Genitourinary:    Pelvic exam was performed with patient supine.   Labial bartholins normal.There is no rash or lesion on the right labia. There is no rash or lesion on the left labia. Right adnexum displays no tenderness and no fullness. Left adnexum displays no tenderness and no fullness. Vaginal cuff normal.  There is vaginal discharge (clear white) in the vagina. No bleeding or unspecified prolapse of vaginal walls in the vagina. Cervix is absent.Uterus is absent.           Musculoskeletal: Normal range of motion.      Lymphadenopathy:     She has no cervical adenopathy.    Neurological: She is oriented to person, place, and time.    Skin: No rash noted. No erythema.    Psychiatric: She has a normal mood and affect. Her behavior is normal.        Assessment:        1. Encounter for gynecological examination without abnormal finding    2. Breast cancer screening by mammogram    3. Pelvic pain               Plan:   1. Encounter for gynecological examination without abnormal finding  - Pap no longer indicated.  S/p Hyst for benign reasons  -   Screening tests as ordered.  - Diet and  exercise encouraged.    Counseling: injury prevention: Driving under the influence of alcohol  Seatbelts  Exercise  Stress management techniques  indications for and frequency of periodic gynecologic exam  reviewed current Pap guidelines. Explained new understanding of natural history of cervical disease and improved Paps. Recommended guideline concordant care.    -pap done. Discussed ASCCP guideline screening every 3 - 5 years.   -screening MMG ordered  -colonoscopy ordered  -counseled on exercise and healthy diet, weight loss  -bone health:  Discussed Vitamin D and Calcium supplementation, weight bearing exercises     Discussed safety at home/school/work, healthy and balanced diet, sleep hygiene, as well as violence/weapons exposure.        2. Breast cancer screening by mammogram  - Self breast exams encouraged  - Mammo Digital Screening Bilat w/ Benigno; Future (schedule for 5/2023)    3. Pelvic pain  - US Pelvis Complete Non OB; Future    Follow up pending US results or next year for annual exam

## 2023-02-09 ENCOUNTER — PATIENT MESSAGE (OUTPATIENT)
Dept: FAMILY MEDICINE | Facility: CLINIC | Age: 43
End: 2023-02-09
Payer: COMMERCIAL

## 2023-04-07 ENCOUNTER — TELEPHONE (OUTPATIENT)
Dept: PHARMACY | Facility: CLINIC | Age: 43
End: 2023-04-07
Payer: COMMERCIAL

## 2023-04-10 ENCOUNTER — HOSPITAL ENCOUNTER (OUTPATIENT)
Dept: RADIOLOGY | Facility: HOSPITAL | Age: 43
Discharge: HOME | End: 2023-04-10
Payer: COMMERCIAL

## 2023-04-10 DIAGNOSIS — R10.2 PELVIC PAIN: ICD-10-CM

## 2023-04-10 PROCEDURE — 76856 US EXAM PELVIC COMPLETE: CPT | Mod: 26,,, | Performed by: RADIOLOGY

## 2023-04-10 PROCEDURE — 76856 US EXAM PELVIC COMPLETE: CPT | Mod: TC

## 2023-04-10 PROCEDURE — 76830 TRANSVAGINAL US NON-OB: CPT | Mod: 26,,, | Performed by: RADIOLOGY

## 2023-04-10 PROCEDURE — 76856 US PELVIS COMP WITH TRANSVAG NON-OB (XPD): ICD-10-PCS | Mod: 26,,, | Performed by: RADIOLOGY

## 2023-04-10 PROCEDURE — 76830 US PELVIS COMP WITH TRANSVAG NON-OB (XPD): ICD-10-PCS | Mod: 26,,, | Performed by: RADIOLOGY

## 2023-04-11 ENCOUNTER — TELEPHONE (OUTPATIENT)
Dept: OBSTETRICS AND GYNECOLOGY | Facility: CLINIC | Age: 43
End: 2023-04-11
Payer: COMMERCIAL

## 2023-04-11 NOTE — TELEPHONE ENCOUNTER
Left message to discuss test results.       ----- Message from Mak Wallis NP sent at 4/11/2023  2:02 PM CDT -----  Please schedule this patient for a virtual visit to discuss her ultrasound results. Preferably in one of my 4p spots    Thanks

## 2023-04-18 ENCOUNTER — TELEPHONE (OUTPATIENT)
Dept: OBSTETRICS AND GYNECOLOGY | Facility: CLINIC | Age: 43
End: 2023-04-18
Payer: COMMERCIAL

## 2023-04-18 NOTE — TELEPHONE ENCOUNTER
Pt advised she can only do early morning appt. Virtual appt schedule on 4/19/2023    ----- Message from Tyesha Mason sent at 4/18/2023  8:40 AM CDT -----  Regarding: Return Call  .Type:  Patient Returning Call    Who Called: Self     Who Left Message for Patient: Jessica    Does the patient know what this is regarding?: test results     Would the patient rather a call back or a response via [facility]? Call     Best Call Back Number: .[phone]

## 2023-04-19 ENCOUNTER — OFFICE VISIT (OUTPATIENT)
Dept: OBSTETRICS AND GYNECOLOGY | Facility: CLINIC | Age: 43
End: 2023-04-19
Payer: COMMERCIAL

## 2023-04-19 DIAGNOSIS — N83.201 CYST OF RIGHT OVARY: Primary | ICD-10-CM

## 2023-04-19 PROCEDURE — 99212 OFFICE O/P EST SF 10 MIN: CPT | Mod: 95,,,

## 2023-04-19 PROCEDURE — 99212 PR OFFICE/OUTPT VISIT, EST, LEVL II, 10-19 MIN: ICD-10-PCS | Mod: 95,,,

## 2023-04-19 RX ORDER — ACETAMINOPHEN AND CODEINE PHOSPHATE 120; 12 MG/5ML; MG/5ML
1 SOLUTION ORAL DAILY
Qty: 28 TABLET | Refills: 11 | Status: SHIPPED | OUTPATIENT
Start: 2023-04-19

## 2023-04-19 RX ORDER — IBUPROFEN 600 MG/1
600 TABLET ORAL 3 TIMES DAILY
Qty: 90 TABLET | Refills: 2 | Status: SHIPPED | OUTPATIENT
Start: 2023-04-19 | End: 2023-06-30

## 2023-04-19 NOTE — PROGRESS NOTES
Subjective:      Patient ID: Crystal Greer is a 43 y.o. female.    Chief Complaint:  No chief complaint on file.      History of Present Illness  HPI  The patient location is: home  The chief complaint leading to consultation is: throbbing, intermittent, pelvic pain at last visit on 2/2/2023    Visit type: audiovisual    Face to Face time with patient: 10 min  25 minutes of total time spent on the encounter, which includes face to face time and non-face to face time preparing to see the patient (eg, review of tests), Obtaining and/or reviewing separately obtained history, Documenting clinical information in the electronic or other health record, Independently interpreting results (not separately reported) and communicating results to the patient/family/caregiver, or Care coordination (not separately reported).         Each patient to whom he or she provides medical services by telemedicine is:  (1) informed of the relationship between the physician and patient and the respective role of any other health care provider with respect to management of the patient; and (2) notified that he or she may decline to receive medical services by telemedicine and may withdraw from such care at any time.    Notes:  We discussed her PUS results from 4/10/2023 that showed Prior hysterectomy. Nonspecific complex hypoechoic area at the right ovary.  No suspicious central vascularity.  Subsequent follow-up in 8-12 weeks can be obtained to ensure stability or resolution. Right Ovary: Appearance: Complex hypoechoic area measuring 2.0 x 1.2 x 2.1 cm.  Other small anechoic areas suggesting cyst or follicles, for reference measuring 0.9 x 1.0 x 0.5 cm.        LAST (me) 2/2/2023:   Annual Exam-Postmenopausal  Patient presents for annual exam. The patient has complaints today of left pelvic discomfort. States it comes and goes and is like a throbbing sensation. She reports a history of ovarian cyst. She would like them to be  reevaluated. The patient is sexually active. GYN screening history: last pap: was normal and last mammogram: approximate date 2022 and was normal. The patient is not taking hormone replacement therapy. Patient denies post-menopausal vaginal bleeding. The patient wears seatbelts: yes. The patient participates in regular exercise: yes. Has the patient ever been transfused or tattooed?: no. The patient reports that there is not domestic violence in her life.     Declines STD screening     Ultrasound Results  2019: Cyst 3.8 x 3.6 x 3.5 adjacent to left ovary  2019 Cyst with septation adjacent to right ovary 5.6 x 2.4 x 3.8    GYN & OB History  Patient's last menstrual period was 2018.   Date of Last Pap: No result found    OB History    Para Term  AB Living   4 3 3 0 1 3   SAB IAB Ectopic Multiple Live Births   1 0 0 0 3      # Outcome Date GA Lbr Jeovany/2nd Weight Sex Delivery Anes PTL Lv   4 Term            3 Term            2 Term            1 SAB                Review of Systems  Review of Systems   Constitutional:  Negative for activity change and appetite change.   Respiratory:  Negative for shortness of breath.    Cardiovascular:  Negative for chest pain.   Gastrointestinal:  Negative for abdominal pain, diarrhea and nausea.   Genitourinary:  Negative for bladder incontinence, decreased libido, dysmenorrhea, dyspareunia, dysuria, flank pain, frequency, genital sores, hematuria, hot flashes, menorrhagia, menstrual problem, pelvic pain, urgency, vaginal bleeding, vaginal discharge, vaginal pain, urinary incontinence, postcoital bleeding, postmenopausal bleeding, vaginal dryness and vaginal odor.   Integumentary:  Negative for breast tenderness.   Neurological:  Negative for headaches.   Breast: Negative for breast self exam and tenderness       Objective:     Physical Exam:   Constitutional: She is oriented to person, place, and time. She appears well-developed.    HENT:   Head:  Normocephalic and atraumatic.    Eyes: EOM are normal.      Pulmonary/Chest: Effort normal.                      Neurological: She is oriented to person, place, and time.     Psychiatric: She has a normal mood and affect.       Assessment:     1. Cyst of right ovary              Plan:     1. Cyst of right ovary  - norethindrone (MICRONOR) 0.35 mg tablet; Take 1 tablet (0.35 mg total) by mouth once daily.  Dispense: 28 tablet; Refill: 11  - ibuprofen (ADVIL,MOTRIN) 600 MG tablet; Take 1 tablet (600 mg total) by mouth 3 (three) times daily.  Dispense: 90 tablet; Refill: 2     We will follow up in 2-3 months to see how she is feeling. If pain continues,we will reevaluate cyst with a follow up US.

## 2023-05-12 DIAGNOSIS — E78.1 PURE HYPERTRIGLYCERIDEMIA: ICD-10-CM

## 2023-05-12 NOTE — TELEPHONE ENCOUNTER
No care due was identified.  [facility] Embedded Care Due Messages. Reference number: [id number].   5/12/2023 7:44:56 AM CDT

## 2023-05-13 RX ORDER — ROSUVASTATIN CALCIUM 40 MG/1
40 TABLET, COATED ORAL NIGHTLY
Qty: 90 TABLET | Refills: 0 | Status: SHIPPED | OUTPATIENT
Start: 2023-05-13 | End: 2023-10-04 | Stop reason: SDUPTHER

## 2023-05-24 RX ORDER — TOPIRAMATE 50 MG/1
CAPSULE, EXTENDED RELEASE ORAL
Qty: 60 CAPSULE | Refills: 11 | Status: SHIPPED | OUTPATIENT
Start: 2023-05-24

## 2023-06-06 ENCOUNTER — LAB VISIT (OUTPATIENT)
Dept: LAB | Facility: HOSPITAL | Age: 43
End: 2023-06-06
Attending: INTERNAL MEDICINE
Payer: COMMERCIAL

## 2023-06-06 DIAGNOSIS — R23.8 SKIN SENSITIVITY: ICD-10-CM

## 2023-06-06 DIAGNOSIS — R52 GENERALIZED BODY ACHES: ICD-10-CM

## 2023-06-06 DIAGNOSIS — R53.83 FATIGUE, UNSPECIFIED TYPE: ICD-10-CM

## 2023-06-06 LAB
ALBUMIN SERPL BCP-MCNC: 4.2 G/DL (ref 3.5–5.2)
ALP SERPL-CCNC: 51 U/L (ref 55–135)
ALT SERPL W/O P-5'-P-CCNC: 19 U/L (ref 10–44)
ANION GAP SERPL CALC-SCNC: 8 MMOL/L (ref 8–16)
AST SERPL-CCNC: 18 U/L (ref 10–40)
BASOPHILS # BLD AUTO: 0.05 K/UL (ref 0–0.2)
BASOPHILS NFR BLD: 0.7 % (ref 0–1.9)
BILIRUB SERPL-MCNC: 0.4 MG/DL (ref 0.1–1)
BUN SERPL-MCNC: 15 MG/DL (ref 6–20)
CALCIUM SERPL-MCNC: 9.8 MG/DL (ref 8.7–10.5)
CHLORIDE SERPL-SCNC: 106 MMOL/L (ref 95–110)
CO2 SERPL-SCNC: 27 MMOL/L (ref 23–29)
CREAT SERPL-MCNC: 0.8 MG/DL (ref 0.5–1.4)
CRP SERPL-MCNC: 0.5 MG/L (ref 0–8.2)
DIFFERENTIAL METHOD: NORMAL
EOSINOPHIL # BLD AUTO: 0.1 K/UL (ref 0–0.5)
EOSINOPHIL NFR BLD: 1.5 % (ref 0–8)
ERYTHROCYTE [DISTWIDTH] IN BLOOD BY AUTOMATED COUNT: 12 % (ref 11.5–14.5)
ERYTHROCYTE [SEDIMENTATION RATE] IN BLOOD BY WESTERGREN METHOD: 5 MM/HR (ref 0–20)
EST. GFR  (NO RACE VARIABLE): >60 ML/MIN/1.73 M^2
FERRITIN SERPL-MCNC: 85 NG/ML (ref 20–300)
GLUCOSE SERPL-MCNC: 91 MG/DL (ref 70–110)
HCT VFR BLD AUTO: 39.1 % (ref 37–48.5)
HGB BLD-MCNC: 13.4 G/DL (ref 12–16)
IMM GRANULOCYTES # BLD AUTO: 0.02 K/UL (ref 0–0.04)
IMM GRANULOCYTES NFR BLD AUTO: 0.3 % (ref 0–0.5)
IRON SERPL-MCNC: 51 UG/DL (ref 30–160)
LYMPHOCYTES # BLD AUTO: 2.6 K/UL (ref 1–4.8)
LYMPHOCYTES NFR BLD: 35.1 % (ref 18–48)
MCH RBC QN AUTO: 30.9 PG (ref 27–31)
MCHC RBC AUTO-ENTMCNC: 34.3 G/DL (ref 32–36)
MCV RBC AUTO: 90 FL (ref 82–98)
MONOCYTES # BLD AUTO: 0.3 K/UL (ref 0.3–1)
MONOCYTES NFR BLD: 4.4 % (ref 4–15)
NEUTROPHILS # BLD AUTO: 4.3 K/UL (ref 1.8–7.7)
NEUTROPHILS NFR BLD: 58 % (ref 38–73)
NRBC BLD-RTO: 0 /100 WBC
PLATELET # BLD AUTO: 246 K/UL (ref 150–450)
PMV BLD AUTO: 9.9 FL (ref 9.2–12.9)
POTASSIUM SERPL-SCNC: 3 MMOL/L (ref 3.5–5.1)
PROT SERPL-MCNC: 7.1 G/DL (ref 6–8.4)
RBC # BLD AUTO: 4.34 M/UL (ref 4–5.4)
SATURATED IRON: 15 % (ref 20–50)
SODIUM SERPL-SCNC: 141 MMOL/L (ref 136–145)
TOTAL IRON BINDING CAPACITY: 334 UG/DL (ref 250–450)
TRANSFERRIN SERPL-MCNC: 226 MG/DL (ref 200–375)
TSH SERPL DL<=0.005 MIU/L-ACNC: 0.61 UIU/ML (ref 0.4–4)
WBC # BLD AUTO: 7.43 K/UL (ref 3.9–12.7)

## 2023-06-06 PROCEDURE — 86431 RHEUMATOID FACTOR QUANT: CPT | Performed by: INTERNAL MEDICINE

## 2023-06-06 PROCEDURE — 84443 ASSAY THYROID STIM HORMONE: CPT | Performed by: INTERNAL MEDICINE

## 2023-06-06 PROCEDURE — 82728 ASSAY OF FERRITIN: CPT | Performed by: INTERNAL MEDICINE

## 2023-06-06 PROCEDURE — 86200 CCP ANTIBODY: CPT | Performed by: INTERNAL MEDICINE

## 2023-06-06 PROCEDURE — 80053 COMPREHEN METABOLIC PANEL: CPT | Performed by: INTERNAL MEDICINE

## 2023-06-06 PROCEDURE — 84466 ASSAY OF TRANSFERRIN: CPT | Performed by: INTERNAL MEDICINE

## 2023-06-06 PROCEDURE — 36415 COLL VENOUS BLD VENIPUNCTURE: CPT | Performed by: INTERNAL MEDICINE

## 2023-06-06 PROCEDURE — 82746 ASSAY OF FOLIC ACID SERUM: CPT | Performed by: INTERNAL MEDICINE

## 2023-06-06 PROCEDURE — 85652 RBC SED RATE AUTOMATED: CPT | Performed by: INTERNAL MEDICINE

## 2023-06-06 PROCEDURE — 82607 VITAMIN B-12: CPT | Performed by: INTERNAL MEDICINE

## 2023-06-06 PROCEDURE — 85025 COMPLETE CBC W/AUTO DIFF WBC: CPT | Performed by: INTERNAL MEDICINE

## 2023-06-06 PROCEDURE — 86140 C-REACTIVE PROTEIN: CPT | Performed by: INTERNAL MEDICINE

## 2023-06-06 PROCEDURE — 86038 ANTINUCLEAR ANTIBODIES: CPT | Performed by: INTERNAL MEDICINE

## 2023-06-07 LAB
CCP AB SER IA-ACNC: 0.7 U/ML
FOLATE SERPL-MCNC: 8.3 NG/ML (ref 4–24)
RHEUMATOID FACT SERPL-ACNC: <13 IU/ML (ref 0–15)
VIT B12 SERPL-MCNC: 400 PG/ML (ref 210–950)

## 2023-06-08 DIAGNOSIS — E87.6 HYPOKALEMIA: Primary | ICD-10-CM

## 2023-06-08 LAB — ANA SER QL IF: NORMAL

## 2023-06-08 RX ORDER — POTASSIUM CHLORIDE 750 MG/1
10 TABLET, EXTENDED RELEASE ORAL DAILY
Qty: 30 TABLET | Refills: 0 | Status: SHIPPED | OUTPATIENT
Start: 2023-06-08 | End: 2023-06-09 | Stop reason: SDUPTHER

## 2023-06-09 DIAGNOSIS — E87.6 HYPOKALEMIA: ICD-10-CM

## 2023-06-09 RX ORDER — POTASSIUM CHLORIDE 750 MG/1
10 TABLET, EXTENDED RELEASE ORAL DAILY
Qty: 30 TABLET | Refills: 0 | Status: SHIPPED | OUTPATIENT
Start: 2023-06-09 | End: 2023-06-12 | Stop reason: SDUPTHER

## 2023-06-09 NOTE — TELEPHONE ENCOUNTER
No care due was identified.  [facility] Embedded Care Due Messages. Reference number: [id number].   6/09/2023 10:44:07 AM CDT

## 2023-06-12 DIAGNOSIS — E87.6 HYPOKALEMIA: ICD-10-CM

## 2023-06-12 RX ORDER — POTASSIUM CHLORIDE 750 MG/1
10 TABLET, EXTENDED RELEASE ORAL DAILY
Qty: 30 TABLET | Refills: 0 | Status: SHIPPED | OUTPATIENT
Start: 2023-06-12 | End: 2023-06-14 | Stop reason: SDUPTHER

## 2023-06-12 NOTE — TELEPHONE ENCOUNTER
No care due was identified.  [facility] Embedded Care Due Messages. Reference number: [id number].   6/12/2023 10:04:06 AM CDT

## 2023-06-14 DIAGNOSIS — E87.6 HYPOKALEMIA: ICD-10-CM

## 2023-06-14 RX ORDER — POTASSIUM CHLORIDE 750 MG/1
10 TABLET, EXTENDED RELEASE ORAL DAILY
Qty: 30 TABLET | Refills: 0 | Status: SHIPPED | OUTPATIENT
Start: 2023-06-14 | End: 2023-12-22

## 2023-06-14 NOTE — TELEPHONE ENCOUNTER
No care due was identified.  [facility] Embedded Care Due Messages. Reference number: [id number].   6/14/2023 3:12:31 PM CDT

## 2023-07-11 ENCOUNTER — HOSPITAL ENCOUNTER (OUTPATIENT)
Dept: RADIOLOGY | Facility: HOSPITAL | Age: 43
Discharge: HOME | End: 2023-07-11
Payer: COMMERCIAL

## 2023-07-11 DIAGNOSIS — Z12.31 BREAST CANCER SCREENING BY MAMMOGRAM: ICD-10-CM

## 2023-07-11 PROCEDURE — 77063 MAMMO DIGITAL SCREENING BILAT WITH TOMO: ICD-10-PCS | Mod: 26,,, | Performed by: RADIOLOGY

## 2023-07-11 PROCEDURE — 77067 SCR MAMMO BI INCL CAD: CPT | Mod: TC

## 2023-07-11 PROCEDURE — 77063 BREAST TOMOSYNTHESIS BI: CPT | Mod: 26,,, | Performed by: RADIOLOGY

## 2023-07-11 PROCEDURE — 77067 SCR MAMMO BI INCL CAD: CPT | Mod: 26,,, | Performed by: RADIOLOGY

## 2023-07-11 PROCEDURE — 77067 MAMMO DIGITAL SCREENING BILAT WITH TOMO: ICD-10-PCS | Mod: 26,,, | Performed by: RADIOLOGY

## 2023-09-26 RX ORDER — ATOGEPANT 30 MG/1
30 TABLET ORAL DAILY
Qty: 30 TABLET | Refills: 11 | Status: SHIPPED | OUTPATIENT
Start: 2023-09-26

## 2023-10-04 DIAGNOSIS — E78.1 PURE HYPERTRIGLYCERIDEMIA: ICD-10-CM

## 2023-10-05 RX ORDER — ROSUVASTATIN CALCIUM 40 MG/1
40 TABLET, COATED ORAL NIGHTLY
Qty: 90 TABLET | Refills: 0 | Status: SHIPPED | OUTPATIENT
Start: 2023-10-05 | End: 2024-01-24 | Stop reason: SDUPTHER

## 2023-10-05 NOTE — TELEPHONE ENCOUNTER
No care due was identified.  [facility] Embedded Care Due Messages. Reference number: [id number].   10/04/2023 10:21:58 PM CDT

## 2023-10-30 DIAGNOSIS — F32.9 REACTIVE DEPRESSION: ICD-10-CM

## 2023-10-30 NOTE — TELEPHONE ENCOUNTER
Care Due:                  Date            Visit Type   Department     Provider  --------------------------------------------------------------------------------                                MYCHART                              ANNUAL       [facility]                              CHECKUP/PHY  MEDICINE/INTERN  Last Visit: 10-      ABRAHAM Davis  Next Visit: None Scheduled  None         None Found                                                            Last  Test          Frequency    Reason                     Performed    Due Date  --------------------------------------------------------------------------------    Office Visit  15 months..  buPROPion, potassium,      10-   12-                             rosuvastatin.............    Lipid Panel.  12 months..  rosuvastatin.............  10-   09-    [facility] Embedded Care Due Messages. Reference number: [id number].   10/30/2023 6:03:53 PM CDT

## 2023-10-31 RX ORDER — BUPROPION HYDROCHLORIDE 150 MG/1
150 TABLET ORAL DAILY
Qty: 90 TABLET | Refills: 1 | Status: SHIPPED | OUTPATIENT
Start: 2023-10-31

## 2023-11-15 ENCOUNTER — OFFICE VISIT (OUTPATIENT)
Dept: FAMILY MEDICINE | Facility: CLINIC | Age: 43
End: 2023-11-15
Payer: COMMERCIAL

## 2023-11-15 VITALS
DIASTOLIC BLOOD PRESSURE: 78 MMHG | OXYGEN SATURATION: 97 % | SYSTOLIC BLOOD PRESSURE: 122 MMHG | WEIGHT: 147.5 LBS | HEART RATE: 92 BPM | TEMPERATURE: 99 F | HEIGHT: 58 IN | BODY MASS INDEX: 30.96 KG/M2

## 2023-11-15 DIAGNOSIS — M54.50 ACUTE RIGHT-SIDED LOW BACK PAIN WITHOUT SCIATICA: Primary | ICD-10-CM

## 2023-11-15 DIAGNOSIS — Z23 NEEDS FLU SHOT: ICD-10-CM

## 2023-11-15 PROCEDURE — 3008F PR BODY MASS INDEX (BMI) DOCUMENTED: ICD-10-PCS | Mod: CPTII,S$GLB,, | Performed by: NURSE PRACTITIONER

## 2023-11-15 PROCEDURE — 1160F PR REVIEW ALL MEDS BY PRESCRIBER/CLIN PHARMACIST DOCUMENTED: ICD-10-PCS | Mod: CPTII,S$GLB,, | Performed by: NURSE PRACTITIONER

## 2023-11-15 PROCEDURE — 3008F BODY MASS INDEX DOCD: CPT | Mod: CPTII,S$GLB,, | Performed by: NURSE PRACTITIONER

## 2023-11-15 PROCEDURE — 3078F PR MOST RECENT DIASTOLIC BLOOD PRESSURE < 80 MM HG: ICD-10-PCS | Mod: CPTII,S$GLB,, | Performed by: NURSE PRACTITIONER

## 2023-11-15 PROCEDURE — 99214 OFFICE O/P EST MOD 30 MIN: CPT | Mod: 25,S$GLB,, | Performed by: NURSE PRACTITIONER

## 2023-11-15 PROCEDURE — 3074F PR MOST RECENT SYSTOLIC BLOOD PRESSURE < 130 MM HG: ICD-10-PCS | Mod: CPTII,S$GLB,, | Performed by: NURSE PRACTITIONER

## 2023-11-15 PROCEDURE — 99214 PR OFFICE/OUTPT VISIT, EST, LEVL IV, 30-39 MIN: ICD-10-PCS | Mod: 25,S$GLB,, | Performed by: NURSE PRACTITIONER

## 2023-11-15 PROCEDURE — 3074F SYST BP LT 130 MM HG: CPT | Mod: CPTII,S$GLB,, | Performed by: NURSE PRACTITIONER

## 2023-11-15 PROCEDURE — 1159F MED LIST DOCD IN RCRD: CPT | Mod: CPTII,S$GLB,, | Performed by: NURSE PRACTITIONER

## 2023-11-15 PROCEDURE — 1160F RVW MEDS BY RX/DR IN RCRD: CPT | Mod: CPTII,S$GLB,, | Performed by: NURSE PRACTITIONER

## 2023-11-15 PROCEDURE — 1159F PR MEDICATION LIST DOCUMENTED IN MEDICAL RECORD: ICD-10-PCS | Mod: CPTII,S$GLB,, | Performed by: NURSE PRACTITIONER

## 2023-11-15 PROCEDURE — 3078F DIAST BP <80 MM HG: CPT | Mod: CPTII,S$GLB,, | Performed by: NURSE PRACTITIONER

## 2023-11-15 PROCEDURE — 96372 PR INJECTION,THERAP/PROPH/DIAG2ST, IM OR SUBCUT: ICD-10-PCS | Mod: S$GLB,,, | Performed by: NURSE PRACTITIONER

## 2023-11-15 PROCEDURE — 99999 PR PBB SHADOW E&M-EST. PATIENT-LVL V: ICD-10-PCS | Mod: PBBFAC,,, | Performed by: NURSE PRACTITIONER

## 2023-11-15 PROCEDURE — 96372 THER/PROPH/DIAG INJ SC/IM: CPT | Mod: S$GLB,,, | Performed by: NURSE PRACTITIONER

## 2023-11-15 PROCEDURE — 99999 PR PBB SHADOW E&M-EST. PATIENT-LVL V: CPT | Mod: PBBFAC,,, | Performed by: NURSE PRACTITIONER

## 2023-11-15 RX ORDER — KETOROLAC TROMETHAMINE 30 MG/ML
60 INJECTION, SOLUTION INTRAMUSCULAR; INTRAVENOUS
Status: COMPLETED | OUTPATIENT
Start: 2023-11-15 | End: 2023-11-15

## 2023-11-15 RX ORDER — METHYLPREDNISOLONE ACETATE 80 MG/ML
80 INJECTION, SUSPENSION INTRA-ARTICULAR; INTRALESIONAL; INTRAMUSCULAR; SOFT TISSUE
Status: COMPLETED | OUTPATIENT
Start: 2023-11-15 | End: 2023-11-15

## 2023-11-15 RX ADMIN — KETOROLAC TROMETHAMINE 60 MG: 30 INJECTION, SOLUTION INTRAMUSCULAR; INTRAVENOUS at 09:11

## 2023-11-15 RX ADMIN — METHYLPREDNISOLONE ACETATE 80 MG: 80 INJECTION, SUSPENSION INTRA-ARTICULAR; INTRALESIONAL; INTRAMUSCULAR; SOFT TISSUE at 10:11

## 2023-11-16 ENCOUNTER — HOSPITAL ENCOUNTER (OUTPATIENT)
Dept: RADIOLOGY | Facility: HOSPITAL | Age: 43
Discharge: HOME | End: 2023-11-16
Attending: NURSE PRACTITIONER
Payer: COMMERCIAL

## 2023-11-16 DIAGNOSIS — M54.50 ACUTE RIGHT-SIDED LOW BACK PAIN WITHOUT SCIATICA: ICD-10-CM

## 2023-11-16 PROCEDURE — 72114 XR LUMBAR SPINE 5 VIEW WITH FLEX AND EXT: ICD-10-PCS | Mod: 26,,, | Performed by: RADIOLOGY

## 2023-11-16 PROCEDURE — 72114 X-RAY EXAM L-S SPINE BENDING: CPT | Mod: 26,,, | Performed by: RADIOLOGY

## 2023-11-16 PROCEDURE — 72114 X-RAY EXAM L-S SPINE BENDING: CPT | Mod: TC,FY

## 2023-11-21 NOTE — PROGRESS NOTES
Subjective:      Patient ID: Crystal Greer is a 43 y.o. female.  Pt is a  who presents to clinic with recurrent flare of back pain without new trauma, injury or fall. Feels pinching while sitting, worse with driving and shooting down legs with long periods of sitting, taking ibuprofen and gabapentin at home with mild relief.     Back Pain  This is a recurrent problem. The current episode started in the past 7 days. The problem occurs constantly. The problem has been rapidly worsening since onset. The pain is present in the lumbar spine, sacro-iliac and gluteal. The quality of the pain is described as aching, burning, cramping, shooting and stabbing. The pain radiates to the right thigh, right knee and right foot. The pain is severe. The pain is The same all the time. The symptoms are aggravated by twisting, sitting, lying down, position and bending. Stiffness is present All day. Associated symptoms include leg pain. Pertinent negatives include no abdominal pain, bladder incontinence, bowel incontinence, chest pain, dysuria, fever, headaches, numbness, paresis, paresthesias, pelvic pain, perianal numbness, tingling, weakness or weight loss. She has tried NSAIDs, analgesics, home exercises, ice and heat for the symptoms. The treatment provided mild relief.     Review of Systems   Constitutional:  Negative for activity change, appetite change, chills, diaphoresis, fatigue, fever, unexpected weight change and weight loss.   Respiratory:  Negative for chest tightness and shortness of breath.    Cardiovascular:  Negative for chest pain and palpitations.   Gastrointestinal:  Negative for abdominal distention, abdominal pain, anal bleeding, blood in stool, bowel incontinence, change in bowel habit, constipation, diarrhea, nausea, rectal pain, vomiting, reflux and fecal incontinence.   Genitourinary:  Negative for bladder incontinence, difficulty urinating, dysuria, menstrual problem and pelvic pain.  "  Musculoskeletal:  Positive for back pain and leg pain. Negative for arthralgias, gait problem, joint swelling, myalgias, neck pain, neck stiffness and joint deformity.   Integumentary:  Negative for color change and rash.   Neurological:  Negative for tingling, weakness, numbness, headaches, paresthesias and coordination difficulties.   Hematological: Negative.    Psychiatric/Behavioral: Negative.     All other systems reviewed and are negative.        Objective:     Vitals:    11/15/23 0908   BP: 122/78   Pulse: 92   Temp: 98.8 °F (37.1 °C)   TempSrc: Oral   SpO2: 97%   Weight: 66.9 kg (147 lb 7.8 oz)   Height: 4' 10" (1.473 m)     Physical Exam  Vitals and nursing note reviewed.   Constitutional:       General: She is not in acute distress.     Appearance: Normal appearance. She is well-developed and well-groomed. She is not ill-appearing.   HENT:      Head: Normocephalic and atraumatic.      Right Ear: External ear normal.      Left Ear: External ear normal.      Nose: Nose normal.      Mouth/Throat:      Lips: Pink.      Mouth: Mucous membranes are moist.   Eyes:      General: Lids are normal. Vision grossly intact. Gaze aligned appropriately.      Conjunctiva/sclera: Conjunctivae normal.      Pupils: Pupils are equal, round, and reactive to light.   Neck:      Trachea: Phonation normal.   Cardiovascular:      Rate and Rhythm: Normal rate and regular rhythm.      Heart sounds: Normal heart sounds.   Pulmonary:      Effort: Pulmonary effort is normal. No accessory muscle usage or respiratory distress.      Breath sounds: Normal breath sounds and air entry.   Abdominal:      General: Abdomen is flat. Bowel sounds are normal. There is no distension.      Palpations: Abdomen is soft.      Tenderness: There is no abdominal tenderness.   Musculoskeletal:      Cervical back: Neck supple.      Thoracic back: Spasms present. No swelling, edema, deformity, signs of trauma, tenderness or bony tenderness. Normal range of " motion.      Lumbar back: Spasms and tenderness present. No swelling, edema, deformity, signs of trauma or bony tenderness. Normal range of motion. Positive right straight leg raise test. Negative left straight leg raise test.        Back:       Right hip: Normal.      Left hip: Normal.      Right lower leg: Normal. No tenderness. No edema.      Left lower leg: Normal. No tenderness. No edema.   Skin:     General: Skin is warm and dry.      Findings: No rash.   Neurological:      General: No focal deficit present.      Mental Status: She is alert and oriented to person, place, and time. Mental status is at baseline.      Sensory: Sensation is intact.      Motor: Motor function is intact. No weakness, tremor, atrophy or abnormal muscle tone.      Coordination: Coordination is intact. Coordination normal.      Gait: Gait abnormal (antalgic gait).   Psychiatric:         Attention and Perception: Attention and perception normal.         Mood and Affect: Mood and affect normal.         Speech: Speech normal.         Behavior: Behavior normal. Behavior is cooperative.         Thought Content: Thought content normal.         Cognition and Memory: Cognition and memory normal.         Judgment: Judgment normal.       EXAMINATION: XR LUMBAR SPINE 5 VIEW WITH FLEX AND EXT  Lumbar spine vertebral body heights and intervertebral disc spaces appear maintained.  No advanced lumbar discogenic abnormality.  Mild discogenic change at T11-T12.  No significant translation between flexion-extension.  No evidence for lytic or blastic lesion.  Assessment and Plan:     1. Acute right-sided low back pain without sciatica  Natural history and expected course discussed. Questions answered.  Educational material distributed.  Proper lifting, bending technique discussed.  Stretching exercises discussed.  Regular aerobic and trunk strengthening exercises discussed.  Ice to affected area as needed for local pain relief.  Heat to affected area as  needed for local pain relief.  NSAIDs per medication orders.  OTC analgesics as needed.  Considering PT if fails to improve or worsen  - X-Ray Lumbar Complete Including Flex And Ext; Future  - ketorolac injection 60 mg  - methylPREDNISolone acetate injection 80 mg    2. Needs flu shot  - Influenza (FLUAD) - Quadrivalent (Adjuvanted) *Preferred* (65+) (PF)           RAYMON Marte, FNP-C  Family/Internal Medicine  [facility]

## 2023-12-22 ENCOUNTER — OFFICE VISIT (OUTPATIENT)
Dept: FAMILY MEDICINE | Facility: CLINIC | Age: 43
End: 2023-12-22
Payer: COMMERCIAL

## 2023-12-22 VITALS
OXYGEN SATURATION: 98 % | DIASTOLIC BLOOD PRESSURE: 78 MMHG | SYSTOLIC BLOOD PRESSURE: 122 MMHG | RESPIRATION RATE: 16 BRPM | HEIGHT: 58 IN | HEART RATE: 84 BPM | WEIGHT: 145.5 LBS | BODY MASS INDEX: 30.54 KG/M2 | TEMPERATURE: 98 F

## 2023-12-22 DIAGNOSIS — J10.1 INFLUENZA A: Primary | ICD-10-CM

## 2023-12-22 LAB
CTP QC/QA: YES
POC MOLECULAR INFLUENZA A AGN: POSITIVE
POC MOLECULAR INFLUENZA B AGN: NEGATIVE

## 2023-12-22 PROCEDURE — 99999 PR PBB SHADOW E&M-EST. PATIENT-LVL IV: ICD-10-PCS | Mod: PBBFAC,,, | Performed by: NURSE PRACTITIONER

## 2023-12-22 PROCEDURE — 1160F RVW MEDS BY RX/DR IN RCRD: CPT | Mod: CPTII,S$GLB,, | Performed by: NURSE PRACTITIONER

## 2023-12-22 PROCEDURE — 99213 PR OFFICE/OUTPT VISIT, EST, LEVL III, 20-29 MIN: ICD-10-PCS | Mod: S$GLB,,, | Performed by: NURSE PRACTITIONER

## 2023-12-22 PROCEDURE — 3074F SYST BP LT 130 MM HG: CPT | Mod: CPTII,S$GLB,, | Performed by: NURSE PRACTITIONER

## 2023-12-22 PROCEDURE — 3078F DIAST BP <80 MM HG: CPT | Mod: CPTII,S$GLB,, | Performed by: NURSE PRACTITIONER

## 2023-12-22 PROCEDURE — 3078F PR MOST RECENT DIASTOLIC BLOOD PRESSURE < 80 MM HG: ICD-10-PCS | Mod: CPTII,S$GLB,, | Performed by: NURSE PRACTITIONER

## 2023-12-22 PROCEDURE — 87502 INFLUENZA DNA AMP PROBE: CPT | Mod: QW,S$GLB,, | Performed by: NURSE PRACTITIONER

## 2023-12-22 PROCEDURE — 87502 POCT INFLUENZA A/B MOLECULAR: ICD-10-PCS | Mod: QW,S$GLB,, | Performed by: NURSE PRACTITIONER

## 2023-12-22 PROCEDURE — 3074F PR MOST RECENT SYSTOLIC BLOOD PRESSURE < 130 MM HG: ICD-10-PCS | Mod: CPTII,S$GLB,, | Performed by: NURSE PRACTITIONER

## 2023-12-22 PROCEDURE — 1159F PR MEDICATION LIST DOCUMENTED IN MEDICAL RECORD: ICD-10-PCS | Mod: CPTII,S$GLB,, | Performed by: NURSE PRACTITIONER

## 2023-12-22 PROCEDURE — 3008F BODY MASS INDEX DOCD: CPT | Mod: CPTII,S$GLB,, | Performed by: NURSE PRACTITIONER

## 2023-12-22 PROCEDURE — 1160F PR REVIEW ALL MEDS BY PRESCRIBER/CLIN PHARMACIST DOCUMENTED: ICD-10-PCS | Mod: CPTII,S$GLB,, | Performed by: NURSE PRACTITIONER

## 2023-12-22 PROCEDURE — 99999 PR PBB SHADOW E&M-EST. PATIENT-LVL IV: CPT | Mod: PBBFAC,,, | Performed by: NURSE PRACTITIONER

## 2023-12-22 PROCEDURE — 3008F PR BODY MASS INDEX (BMI) DOCUMENTED: ICD-10-PCS | Mod: CPTII,S$GLB,, | Performed by: NURSE PRACTITIONER

## 2023-12-22 PROCEDURE — 1159F MED LIST DOCD IN RCRD: CPT | Mod: CPTII,S$GLB,, | Performed by: NURSE PRACTITIONER

## 2023-12-22 PROCEDURE — 99213 OFFICE O/P EST LOW 20 MIN: CPT | Mod: S$GLB,,, | Performed by: NURSE PRACTITIONER

## 2023-12-22 RX ORDER — BENZONATATE 200 MG/1
200 CAPSULE ORAL 3 TIMES DAILY PRN
Qty: 30 CAPSULE | Refills: 0 | Status: SHIPPED | OUTPATIENT
Start: 2023-12-22 | End: 2024-01-01

## 2023-12-22 RX ORDER — PROMETHAZINE HYDROCHLORIDE AND DEXTROMETHORPHAN HYDROBROMIDE 6.25; 15 MG/5ML; MG/5ML
5 SYRUP ORAL NIGHTLY PRN
Qty: 118 ML | Refills: 0 | Status: SHIPPED | OUTPATIENT
Start: 2023-12-22 | End: 2024-02-06

## 2023-12-22 RX ORDER — OSELTAMIVIR PHOSPHATE 75 MG/1
75 CAPSULE ORAL 2 TIMES DAILY
Qty: 10 CAPSULE | Refills: 0 | Status: SHIPPED | OUTPATIENT
Start: 2023-12-22 | End: 2023-12-27

## 2023-12-22 NOTE — PROGRESS NOTES
Routine Office Visit    Patient Name: Crystal Greer    : 1980  MRN: [id number]    Chief Complaint:  Headache, sore throat, cough    Subjective:  Crystal is a 43 y.o. female who presents today for:    Headache, sore throat, cough:  Patient who is new to me reports today for a number of upper respiratory symptoms including sore throat, headache, cough, nasal congestion.  Symptoms have been going on for 2 days.  Fever started late last night.  Took promethazine syrup this morning.  Reports multiple recent sick family contacts.  Denies shortness of breath or wheezing.  No other acute concerns.    Past Medical History  Past Medical History:   Diagnosis Date    Headache        Family History  Family History   Problem Relation Age of Onset    Hypertension Mother     No Known Problems Father     No Known Problems Sister     No Known Problems Brother     Diabetes Maternal Grandmother     Stroke Maternal Grandmother     Aneurysm Maternal Grandfather     Dementia Paternal Grandmother        Current Medications  Current Outpatient Medications on File Prior to Visit   Medication Sig Dispense Refill    atogepant (QULIPTA) 30 mg Tab Take 1 tablet (30 mg) by mouth once daily. 30 tablet 11    brompheniramine-pseudoeph-DM (BROMFED DM) 2-30-10 mg/5 mL Syrp Take 5 mLs by mouth every 4 to 6 hours as needed. 480 mL 0    buPROPion (WELLBUTRIN XL) 150 MG TB24 tablet Take 1 tablet (150 mg total) by mouth once daily. 90 tablet 1    butalbital-acetaminophen-caffeine -40 mg (FIORICET, ESGIC) -40 mg per tablet 1 tab po TID x 3 days,t hen 1 tab po bid x 2 days, then 1 tab daily x 1 day. 14 tablet 0    norethindrone (MICRONOR) 0.35 mg tablet Take 1 tablet (0.35 mg total) by mouth once daily. 28 tablet 11    ondansetron (ZOFRAN) 4 MG tablet Take 1 tablet (4 mg total) by mouth every 6 (six) hours as needed for Nausea. 20 tablet 0    rosuvastatin (CRESTOR) 40 MG Tab Take 1 tablet (40 mg total) by mouth every evening. 90  "tablet 0    topiramate (TROKENDI XR) 50 mg Cp24 Take 1 capsule (50mg total) by mouth daily x 1 week, then take 2 capsules (100mg total) by mouth daily and continue this dose. 60 capsule 11    cetirizine (ZYRTEC) 10 MG tablet Take 1 tablet (10 mg total) by mouth once daily. 30 tablet 2    gabapentin (NEURONTIN) 300 MG capsule 1 capsule po qhs x 1 week, then 2 capsules po qhs 60 capsule 6    potassium chloride SA (K-DUR,KLOR-CON M) 10 MEQ tablet Take 1 tablet (10 mEq total) by mouth once daily. 30 tablet 0     No current facility-administered medications on file prior to visit.       Allergies   Review of patient's allergies indicates:   Allergen Reactions    Codeine Hives       Review of Systems (Pertinent positives)  Review of Systems   Constitutional:  Positive for fever and malaise/fatigue. Negative for chills and weight loss.   HENT:  Positive for congestion and sore throat. Negative for ear pain and sinus pain.    Eyes: Negative.    Respiratory:  Positive for cough and sputum production. Negative for hemoptysis, shortness of breath and wheezing.    Cardiovascular: Negative.  Negative for chest pain, palpitations and orthopnea.   Gastrointestinal: Negative.    Genitourinary: Negative.    Musculoskeletal:  Positive for myalgias.   Skin: Negative.    Neurological:  Positive for headaches. Negative for dizziness, tingling and tremors.   Endo/Heme/Allergies: Negative.    Psychiatric/Behavioral: Negative.         /78 (BP Location: Left arm, Patient Position: Sitting, BP Method: Large (Manual))   Pulse 84   Temp 98 °F (36.7 °C) (Oral)   Resp 16   Ht 4' 10" (1.473 m)   Wt 66 kg (145 lb 8.1 oz)   LMP 06/25/2018   SpO2 98%   BMI 30.41 kg/m²     Physical Exam  Vitals reviewed.   Constitutional:       General: She is not in acute distress.     Appearance: Normal appearance. She is not ill-appearing, toxic-appearing or diaphoretic.   HENT:      Head: Normocephalic and atraumatic.   Cardiovascular:      Rate and " Rhythm: Normal rate and regular rhythm.      Pulses: Normal pulses.      Heart sounds: Normal heart sounds.   Pulmonary:      Effort: Pulmonary effort is normal. No respiratory distress.      Breath sounds: Normal breath sounds. No wheezing.   Musculoskeletal:         General: No swelling, tenderness or deformity.   Skin:     General: Skin is warm and dry.      Capillary Refill: Capillary refill takes less than 2 seconds.   Neurological:      Mental Status: She is alert and oriented to person, place, and time.   Psychiatric:         Mood and Affect: Mood normal.         Behavior: Behavior normal.          Assessment/Plan:  Crystal Greer is a 43 y.o. female who presents today for :    Crystal was seen today for generalized body aches, sore throat, headache, fever, cough and nasal congestion.    Diagnoses and all orders for this visit:    Influenza A  -     POCT Influenza A/B Molecular  -     oseltamivir (TAMIFLU) 75 MG capsule; Take 1 capsule (75 mg total) by mouth 2 (two) times daily. for 5 days  -     promethazine-dextromethorphan (PROMETHAZINE-DM) 6.25-15 mg/5 mL Syrp; Take 5 mLs by mouth nightly as needed (cough).  -     benzonatate (TESSALON) 200 MG capsule; Take 1 capsule (200 mg total) by mouth 3 (three) times daily as needed for Cough.    Point of care influenza test positive for influenza A.  Start Tamiflu treatment.  P.r.n. Tessalon and promethazine DM for cough.  Home care measures discussed.  Patient can be around others after 4-5 days if she has not running a fever without fever reducing medications.  All questions answered.  Follow up with me as needed.          This office note has been dictated.  This dictation has been generated using M-Modal Fluency Direct dictation; some phonetic errors may occur.

## 2024-01-24 DIAGNOSIS — E78.1 PURE HYPERTRIGLYCERIDEMIA: ICD-10-CM

## 2024-01-24 RX ORDER — ROSUVASTATIN CALCIUM 40 MG/1
40 TABLET, COATED ORAL NIGHTLY
Qty: 90 TABLET | Refills: 0 | Status: SHIPPED | OUTPATIENT
Start: 2024-01-24 | End: 2024-02-06 | Stop reason: SDUPTHER

## 2024-01-24 NOTE — TELEPHONE ENCOUNTER
Care Due:                  Date            Visit Type   Department     Provider  --------------------------------------------------------------------------------                                MYCHART                              ANNUAL       [facility] FAMILY                              CHECKUP/PHY  MEDICINE/INTERN  Last Visit: 10-      ABRAAHM Davis  Next Visit: None Scheduled  None         None Found                                                            Last  Test          Frequency    Reason                     Performed    Due Date  --------------------------------------------------------------------------------    Office Visit  15 months..  buPROPion, rosuvastatin..  10-   12-    Lipid Panel.  12 months..  rosuvastatin.............  10-   09-    [employer] Embedded Care Due Messages. Reference number: [id number].   1/24/2024 10:21:09 AM CST

## 2024-02-06 PROBLEM — E78.1 PURE HYPERTRIGLYCERIDEMIA: Status: ACTIVE | Noted: 2024-02-06

## 2024-02-06 PROBLEM — M54.50 CHRONIC RIGHT-SIDED LOW BACK PAIN WITHOUT SCIATICA: Status: ACTIVE | Noted: 2024-02-06

## 2024-02-06 PROBLEM — G89.29 CHRONIC RIGHT-SIDED LOW BACK PAIN WITHOUT SCIATICA: Status: ACTIVE | Noted: 2024-02-06

## 2024-02-07 LAB
ALBUMIN SERPL-MCNC: 4 G/DL (ref 3.6–5.1)
ALBUMIN/GLOB SERPL: 1.5 (CALC) (ref 1–2.5)
ALP SERPL-CCNC: 36 U/L (ref 31–125)
ALT SERPL-CCNC: 18 U/L (ref 6–29)
AST SERPL-CCNC: 13 U/L (ref 10–30)
BASOPHILS # BLD AUTO: 43 CELLS/UL (ref 0–200)
BASOPHILS NFR BLD AUTO: 0.5 %
BILIRUB SERPL-MCNC: 0.5 MG/DL (ref 0.2–1.2)
BUN SERPL-MCNC: 15 MG/DL (ref 7–25)
BUN/CREAT SERPL: ABNORMAL (CALC) (ref 6–22)
CALCIUM SERPL-MCNC: 8.9 MG/DL (ref 8.6–10.2)
CHLORIDE SERPL-SCNC: 108 MMOL/L (ref 98–110)
CHOLEST SERPL-MCNC: 124 MG/DL
CHOLEST/HDLC SERPL: 2.6 (CALC)
CO2 SERPL-SCNC: 23 MMOL/L (ref 20–32)
CREAT SERPL-MCNC: 0.69 MG/DL (ref 0.5–0.99)
EGFR: 110 ML/MIN/1.73M2
EOSINOPHIL # BLD AUTO: 102 CELLS/UL (ref 15–500)
EOSINOPHIL NFR BLD AUTO: 1.2 %
ERYTHROCYTE [DISTWIDTH] IN BLOOD BY AUTOMATED COUNT: 12.4 % (ref 11–15)
GLOBULIN SER CALC-MCNC: 2.7 G/DL (CALC) (ref 1.9–3.7)
GLUCOSE SERPL-MCNC: 88 MG/DL (ref 65–99)
HBA1C MFR BLD: 5.2 % OF TOTAL HGB
HCT VFR BLD AUTO: 39.2 % (ref 35–45)
HDLC SERPL-MCNC: 48 MG/DL
HGB BLD-MCNC: 13.3 G/DL (ref 11.7–15.5)
LDLC SERPL CALC-MCNC: 54 MG/DL (CALC)
LYMPHOCYTES # BLD AUTO: 1964 CELLS/UL (ref 850–3900)
LYMPHOCYTES NFR BLD AUTO: 23.1 %
MCH RBC QN AUTO: 30.9 PG (ref 27–33)
MCHC RBC AUTO-ENTMCNC: 33.9 G/DL (ref 32–36)
MCV RBC AUTO: 91.2 FL (ref 80–100)
MONOCYTES # BLD AUTO: 349 CELLS/UL (ref 200–950)
MONOCYTES NFR BLD AUTO: 4.1 %
NEUTROPHILS # BLD AUTO: 6044 CELLS/UL (ref 1500–7800)
NEUTROPHILS NFR BLD AUTO: 71.1 %
NONHDLC SERPL-MCNC: 76 MG/DL (CALC)
PLATELET # BLD AUTO: 270 THOUSAND/UL (ref 140–400)
PMV BLD REES-ECKER: 10.6 FL (ref 7.5–12.5)
POTASSIUM SERPL-SCNC: 3.4 MMOL/L (ref 3.5–5.3)
PROT SERPL-MCNC: 6.7 G/DL (ref 6.1–8.1)
RBC # BLD AUTO: 4.3 MILLION/UL (ref 3.8–5.1)
SODIUM SERPL-SCNC: 140 MMOL/L (ref 135–146)
TRIGL SERPL-MCNC: 138 MG/DL
TSH SERPL-ACNC: 0.7 MIU/L
WBC # BLD AUTO: 8.5 THOUSAND/UL (ref 3.8–10.8)

## 2024-04-15 ENCOUNTER — PATIENT MESSAGE (OUTPATIENT)
Dept: ADMINISTRATIVE | Facility: HOSPITAL | Age: 44
End: 2024-04-15
Payer: COMMERCIAL

## 2024-05-27 RX ORDER — TOPIRAMATE 50 MG/1
CAPSULE, EXTENDED RELEASE ORAL
Qty: 60 CAPSULE | Refills: 0 | Status: SHIPPED | OUTPATIENT
Start: 2024-05-27

## 2024-05-27 RX ORDER — TOPIRAMATE 50 MG/1
CAPSULE, EXTENDED RELEASE ORAL
Qty: 60 CAPSULE | Refills: 11 | Status: CANCELLED | OUTPATIENT
Start: 2024-05-27

## 2024-05-27 NOTE — TELEPHONE ENCOUNTER
Has not been seen since 9/2022 with Dr. Enamorado. Refill today but she needs to establish with new provider.

## 2024-06-07 ENCOUNTER — TELEPHONE (OUTPATIENT)
Dept: NEUROLOGY | Facility: CLINIC | Age: 44
End: 2024-06-07
Payer: COMMERCIAL

## 2024-07-15 ENCOUNTER — HOSPITAL ENCOUNTER (OUTPATIENT)
Dept: RADIOLOGY | Facility: HOSPITAL | Age: 44
Discharge: HOME | End: 2024-07-15
Attending: INTERNAL MEDICINE
Payer: COMMERCIAL

## 2024-07-15 DIAGNOSIS — Z12.31 ENCOUNTER FOR SCREENING MAMMOGRAM FOR BREAST CANCER: ICD-10-CM

## 2024-07-15 PROCEDURE — 77063 BREAST TOMOSYNTHESIS BI: CPT | Mod: 26,,, | Performed by: RADIOLOGY

## 2024-07-15 PROCEDURE — 77063 BREAST TOMOSYNTHESIS BI: CPT | Mod: TC

## 2024-07-15 PROCEDURE — 77067 SCR MAMMO BI INCL CAD: CPT | Mod: 26,,, | Performed by: RADIOLOGY

## 2024-07-29 ENCOUNTER — E-VISIT (OUTPATIENT)
Dept: FAMILY MEDICINE | Facility: CLINIC | Age: 44
End: 2024-07-29
Payer: COMMERCIAL

## 2024-07-29 DIAGNOSIS — U07.1 COVID: Primary | ICD-10-CM

## 2024-07-29 RX ORDER — NIRMATRELVIR AND RITONAVIR 300-100 MG
KIT ORAL
Qty: 30 TABLET | Refills: 0 | Status: SHIPPED | OUTPATIENT
Start: 2024-07-29 | End: 2024-07-30 | Stop reason: SDUPTHER

## 2024-07-29 RX ORDER — BROMPHENIRAMINE MALEATE, PSEUDOEPHEDRINE HYDROCHLORIDE, AND DEXTROMETHORPHAN HYDROBROMIDE 2; 30; 10 MG/5ML; MG/5ML; MG/5ML
5 SYRUP ORAL
Qty: 240 ML | Refills: 0 | Status: SHIPPED | OUTPATIENT
Start: 2024-07-29

## 2024-07-29 NOTE — PROGRESS NOTES
Patient ID: Crystal Greer is a 44 y.o. female.    Chief Complaint: URI (Covid positive)    The patient initiated a request through [employer] on 7/29/2024 for evaluation and management with a chief complaint of URI (Covid positive)     I evaluated the questionnaire submission on 7/29/2024.    [facility] E-Visit Covid    7/29/2024 10:20 AM CDT - Filed by Patient   Do you agree to participate in an E-Visit? Yes   If you have any of the following symptoms, go to your local emergency room or call 911: I acknowledge   Are you pregnant, could you be pregnant, or are you breast feeding? None of the above   What is the main issue you would like addressed today? Sore throat, bodies aches and chills   Do you think you might have COVID or the Flu? Yes COVID   Have you tested positive for COVID or Flu? Yes COVID   What symptoms do you have? Fatigue;  Muscle or body aches;  Runny nose;  Sore throat   When did your symptoms first appear? 7/28/2024   List what you have done or taken to help your symptoms. Take ibuprofen   Provide any additional information you feel is important. Feeling really bad today   Please attach any relevant images or files    Are you able to take your vital signs? No         Encounter Diagnosis   Name Primary?    COVID Yes        No orders of the defined types were placed in this encounter.     Medications Ordered This Encounter   Medications    brompheniramine-pseudoeph-DM (BROMFED DM) 2-30-10 mg/5 mL Syrp     Sig: Take 5 mLs by mouth every 4 to 6 hours as needed.     Dispense:  240 mL     Refill:  0    nirmatrelvir-ritonavir (PAXLOVID) 300 mg (150 mg x 2)-100 mg copackaged tablets (EUA)     Sig: Take 3 tablets by mouth 2 (two) times daily. Each dose contains 2 nirmatrelvir (pink tablets) and 1 ritonavir (white tablet). Take all 3 tablets together     Dispense:  30 tablet     Refill:  0        No follow-ups on file.      E-Visit Time Tracking:    Day 1 Time (in minutes): 12    Total Time (in  minutes): 12

## 2024-07-29 NOTE — LETTER
July 31, 2024    Crystal Greer  [address]  [place] [address]             [facility]  Family Medicine  [address]  [place] [address]  Phone: [phone]  Fax: [phone]   July 31, 2024     Patient: Crystal Greer   YOB: 1980   Date of Visit: 7/29/2024       To Whom it May Concern:    Crystal Greer was seen in my clinic on 7/29/2024. She may return to work on Monday, 8/5/2024 .    Please excuse her from any work missed.    If you have any questions or concerns, please don't hesitate to call.    Sincerely,         Jessica Leigh, NP

## 2024-07-29 NOTE — LETTER
July 30, 2024    Crystal Greer  [address]  [place] [address]             [facility]  Family Medicine  [address]  [place] [address]  Phone: [phone]  Fax: [phone]   July 30, 2024     Patient: Crystal Greer   YOB: 1980   Date of Visit: 7/29/2024       To Whom it May Concern:    Crystal Greer was seen in my clinic on 7/27/2024. She may return to work on 8/1/2024 and may return with no restrictions.    Please excuse her from any classes or work missed.    If you have any questions or concerns, please don't hesitate to call.    Sincerely,         Jessica Leigh, NP

## 2024-07-30 DIAGNOSIS — U07.1 COVID: ICD-10-CM

## 2024-07-30 RX ORDER — NIRMATRELVIR AND RITONAVIR 300-100 MG
KIT ORAL
Qty: 30 TABLET | Refills: 0 | Status: SHIPPED | OUTPATIENT
Start: 2024-07-30

## 2024-07-30 RX ORDER — NIRMATRELVIR AND RITONAVIR 300-100 MG
KIT ORAL
Qty: 30 TABLET | Refills: 0 | Status: SHIPPED | OUTPATIENT
Start: 2024-07-30 | End: 2024-07-30

## 2024-07-31 ENCOUNTER — CLINICAL SUPPORT (OUTPATIENT)
Dept: FAMILY MEDICINE | Facility: CLINIC | Age: 44
End: 2024-07-31
Payer: COMMERCIAL

## 2024-07-31 DIAGNOSIS — J02.9 ACUTE PHARYNGITIS, UNSPECIFIED ETIOLOGY: Primary | ICD-10-CM

## 2024-07-31 LAB
CTP QC/QA: YES
S PYO RRNA THROAT QL PROBE: NEGATIVE

## 2024-07-31 PROCEDURE — 99999 PR PBB SHADOW E&M-EST. PATIENT-LVL II: CPT | Mod: PBBFAC,,,

## 2024-07-31 PROCEDURE — 87880 STREP A ASSAY W/OPTIC: CPT | Mod: QW,S$GLB,, | Performed by: INTERNAL MEDICINE

## 2024-07-31 RX ORDER — AZITHROMYCIN 500 MG/1
500 TABLET, FILM COATED ORAL DAILY
Qty: 5 TABLET | Refills: 0 | Status: SHIPPED | OUTPATIENT
Start: 2024-07-31 | End: 2024-07-31 | Stop reason: SDUPTHER

## 2024-07-31 RX ORDER — PREDNISONE 50 MG/1
50 TABLET ORAL DAILY
Qty: 5 TABLET | Refills: 0 | Status: SHIPPED | OUTPATIENT
Start: 2024-07-31

## 2024-07-31 RX ORDER — PREDNISONE 50 MG/1
50 TABLET ORAL DAILY
Qty: 5 TABLET | Refills: 0 | Status: SHIPPED | OUTPATIENT
Start: 2024-07-31 | End: 2024-07-31 | Stop reason: SDUPTHER

## 2024-07-31 RX ORDER — AZITHROMYCIN 500 MG/1
500 TABLET, FILM COATED ORAL DAILY
Qty: 5 TABLET | Refills: 0 | Status: SHIPPED | OUTPATIENT
Start: 2024-07-31

## 2024-08-12 DIAGNOSIS — E78.1 PURE HYPERTRIGLYCERIDEMIA: ICD-10-CM

## 2024-08-12 DIAGNOSIS — M54.50 CHRONIC RIGHT-SIDED LOW BACK PAIN WITHOUT SCIATICA: ICD-10-CM

## 2024-08-12 DIAGNOSIS — G89.29 CHRONIC RIGHT-SIDED LOW BACK PAIN WITHOUT SCIATICA: ICD-10-CM

## 2024-08-12 DIAGNOSIS — F32.9 REACTIVE DEPRESSION: ICD-10-CM

## 2024-08-13 RX ORDER — BUPROPION HYDROCHLORIDE 150 MG/1
150 TABLET ORAL DAILY
Qty: 90 TABLET | Refills: 1 | Status: SHIPPED | OUTPATIENT
Start: 2024-08-13

## 2024-08-13 RX ORDER — GABAPENTIN 300 MG/1
300 CAPSULE ORAL NIGHTLY
Qty: 90 CAPSULE | Refills: 1 | Status: SHIPPED | OUTPATIENT
Start: 2024-08-13

## 2024-08-13 RX ORDER — ROSUVASTATIN CALCIUM 40 MG/1
40 TABLET, COATED ORAL NIGHTLY
Qty: 90 TABLET | Refills: 2 | Status: SHIPPED | OUTPATIENT
Start: 2024-08-13 | End: 2025-08-13

## 2024-08-13 NOTE — TELEPHONE ENCOUNTER
Care Due:                  Date            Visit Type   Department     Provider  --------------------------------------------------------------------------------                                MYCHART                              ANNUAL       [facility] FAMILY                              CHECKUP/PHY  MEDICINE/INTERN  Last Visit: 10-      ABRAHAM Davis  Next Visit: None Scheduled  None         None Found                                                            Last  Test          Frequency    Reason                     Performed    Due Date  --------------------------------------------------------------------------------    Office Visit  15 months..  buPROPion, rosuvastatin..  10-   12-    [facility] Embedded Care Due Messages. Reference number: [id number].   8/12/2024 9:50:43 PM CDT

## 2024-10-28 ENCOUNTER — IMMUNIZATION (OUTPATIENT)
Dept: FAMILY MEDICINE | Facility: CLINIC | Age: 44
End: 2024-10-28
Payer: COMMERCIAL

## 2024-10-28 DIAGNOSIS — Z23 IMMUNIZATION DUE: Primary | ICD-10-CM

## 2024-10-28 PROCEDURE — 90656 IIV3 VACC NO PRSV 0.5 ML IM: CPT | Mod: S$GLB,,, | Performed by: INTERNAL MEDICINE

## 2024-10-28 PROCEDURE — G0008 ADMIN INFLUENZA VIRUS VAC: HCPCS | Mod: S$GLB,,, | Performed by: INTERNAL MEDICINE

## 2024-10-30 ENCOUNTER — HOSPITAL ENCOUNTER (OUTPATIENT)
Dept: RADIOLOGY | Facility: HOSPITAL | Age: 44
Discharge: HOME | End: 2024-10-30
Attending: NURSE PRACTITIONER
Payer: COMMERCIAL

## 2024-10-30 ENCOUNTER — OFFICE VISIT (OUTPATIENT)
Dept: FAMILY MEDICINE | Facility: CLINIC | Age: 44
End: 2024-10-30
Payer: COMMERCIAL

## 2024-10-30 VITALS
WEIGHT: 148.38 LBS | TEMPERATURE: 99 F | HEIGHT: 58 IN | RESPIRATION RATE: 16 BRPM | HEART RATE: 90 BPM | OXYGEN SATURATION: 98 % | BODY MASS INDEX: 31.14 KG/M2 | DIASTOLIC BLOOD PRESSURE: 70 MMHG | SYSTOLIC BLOOD PRESSURE: 130 MMHG

## 2024-10-30 DIAGNOSIS — S16.1XXA STRAIN OF CERVICAL PORTION OF LEFT TRAPEZIUS MUSCLE: ICD-10-CM

## 2024-10-30 DIAGNOSIS — M25.512 ARTHRALGIA OF LEFT ACROMIOCLAVICULAR JOINT: ICD-10-CM

## 2024-10-30 DIAGNOSIS — S16.1XXA STRAIN OF CERVICAL PORTION OF LEFT TRAPEZIUS MUSCLE: Primary | ICD-10-CM

## 2024-10-30 PROCEDURE — 3075F SYST BP GE 130 - 139MM HG: CPT | Mod: CPTII,S$GLB,, | Performed by: NURSE PRACTITIONER

## 2024-10-30 PROCEDURE — 3044F HG A1C LEVEL LT 7.0%: CPT | Mod: CPTII,S$GLB,, | Performed by: NURSE PRACTITIONER

## 2024-10-30 PROCEDURE — 72050 X-RAY EXAM NECK SPINE 4/5VWS: CPT | Mod: TC,FY

## 2024-10-30 PROCEDURE — 1160F RVW MEDS BY RX/DR IN RCRD: CPT | Mod: CPTII,S$GLB,, | Performed by: NURSE PRACTITIONER

## 2024-10-30 PROCEDURE — 73030 X-RAY EXAM OF SHOULDER: CPT | Mod: 26,LT,, | Performed by: INTERNAL MEDICINE

## 2024-10-30 PROCEDURE — 99214 OFFICE O/P EST MOD 30 MIN: CPT | Mod: S$GLB,,, | Performed by: NURSE PRACTITIONER

## 2024-10-30 PROCEDURE — 3008F BODY MASS INDEX DOCD: CPT | Mod: CPTII,S$GLB,, | Performed by: NURSE PRACTITIONER

## 2024-10-30 PROCEDURE — 3078F DIAST BP <80 MM HG: CPT | Mod: CPTII,S$GLB,, | Performed by: NURSE PRACTITIONER

## 2024-10-30 PROCEDURE — 1159F MED LIST DOCD IN RCRD: CPT | Mod: CPTII,S$GLB,, | Performed by: NURSE PRACTITIONER

## 2024-10-30 PROCEDURE — 99999 PR PBB SHADOW E&M-EST. PATIENT-LVL IV: CPT | Mod: PBBFAC,,, | Performed by: NURSE PRACTITIONER

## 2024-10-30 PROCEDURE — 72050 X-RAY EXAM NECK SPINE 4/5VWS: CPT | Mod: 26,,, | Performed by: INTERNAL MEDICINE

## 2024-10-30 PROCEDURE — 73030 X-RAY EXAM OF SHOULDER: CPT | Mod: TC,FY,LT

## 2024-10-30 RX ORDER — METHOCARBAMOL 750 MG/1
750 TABLET, FILM COATED ORAL
Qty: 60 TABLET | Refills: 0 | Status: SHIPPED | OUTPATIENT
Start: 2024-10-30

## 2024-10-31 ENCOUNTER — PATIENT MESSAGE (OUTPATIENT)
Dept: FAMILY MEDICINE | Facility: CLINIC | Age: 44
End: 2024-10-31
Payer: COMMERCIAL

## 2024-10-31 DIAGNOSIS — M47.22 CERVICAL SPONDYLOSIS WITH RADICULOPATHY: ICD-10-CM

## 2024-10-31 DIAGNOSIS — R93.7 ABNORMAL X-RAY OF CERVICAL SPINE: Primary | ICD-10-CM

## 2024-12-14 DIAGNOSIS — M54.50 CHRONIC RIGHT-SIDED LOW BACK PAIN WITHOUT SCIATICA: ICD-10-CM

## 2024-12-14 DIAGNOSIS — G89.29 CHRONIC RIGHT-SIDED LOW BACK PAIN WITHOUT SCIATICA: ICD-10-CM

## 2024-12-14 DIAGNOSIS — F32.9 REACTIVE DEPRESSION: ICD-10-CM

## 2024-12-15 NOTE — TELEPHONE ENCOUNTER
Care Due:                  Date            Visit Type   Department     Provider  --------------------------------------------------------------------------------    Last Visit: None Found      None         None Found  Next Visit: None Scheduled  None         None Found                                                            Last  Test          Frequency    Reason                     Performed    Due Date  --------------------------------------------------------------------------------    Office Visit  15 months..  buPROPion, rosuvastatin..  Not Found    Overdue    CMP.........  12 months..  rosuvastatin.............  02- 02-    Lipid Panel.  12 months..  rosuvastatin.............  02- 02-    [facility] Embedded Care Due Messages. Reference number: [id number].   12/14/2024 11:40:49 PM CST

## 2024-12-16 RX ORDER — GABAPENTIN 300 MG/1
300 CAPSULE ORAL NIGHTLY
Qty: 90 CAPSULE | Refills: 0 | Status: SHIPPED | OUTPATIENT
Start: 2024-12-16

## 2024-12-16 RX ORDER — BUPROPION HYDROCHLORIDE 150 MG/1
150 TABLET ORAL DAILY
Qty: 90 TABLET | Refills: 0 | Status: SHIPPED | OUTPATIENT
Start: 2024-12-16

## 2025-01-10 DIAGNOSIS — E78.1 PURE HYPERTRIGLYCERIDEMIA: ICD-10-CM

## 2025-01-10 RX ORDER — ROSUVASTATIN CALCIUM 40 MG/1
40 TABLET, COATED ORAL NIGHTLY
Qty: 90 TABLET | Refills: 0 | Status: SHIPPED | OUTPATIENT
Start: 2025-01-10 | End: 2025-04-10

## 2025-01-10 NOTE — TELEPHONE ENCOUNTER
No care due was identified.  [facility] Embedded Care Due Messages. Reference number: [id number].   1/10/2025 9:41:11 AM CST

## 2025-01-10 NOTE — TELEPHONE ENCOUNTER
Refill Decision Note   Crystal Zoey  is requesting a refill authorization.  Brief Assessment and Rationale for Refill:  Approve     Medication Therapy Plan:        Comments:     Note composed:1:05 PM 01/10/2025

## 2025-02-24 ENCOUNTER — OFFICE VISIT (OUTPATIENT)
Dept: FAMILY MEDICINE | Facility: CLINIC | Age: 45
End: 2025-02-24
Payer: COMMERCIAL

## 2025-02-24 VITALS
TEMPERATURE: 98 F | OXYGEN SATURATION: 97 % | HEIGHT: 58 IN | SYSTOLIC BLOOD PRESSURE: 146 MMHG | DIASTOLIC BLOOD PRESSURE: 96 MMHG | BODY MASS INDEX: 32.67 KG/M2 | RESPIRATION RATE: 16 BRPM | WEIGHT: 155.63 LBS | HEART RATE: 80 BPM

## 2025-02-24 DIAGNOSIS — Z12.11 COLON CANCER SCREENING: ICD-10-CM

## 2025-02-24 DIAGNOSIS — R03.0 ELEVATED BLOOD PRESSURE READING: Primary | ICD-10-CM

## 2025-02-24 DIAGNOSIS — G95.20 CERVICAL SPINAL CORD COMPRESSION: ICD-10-CM

## 2025-02-24 DIAGNOSIS — M46.1 SACROILIITIS: ICD-10-CM

## 2025-02-24 DIAGNOSIS — E78.2 MIXED HYPERLIPIDEMIA: ICD-10-CM

## 2025-02-24 PROBLEM — E78.1 PURE HYPERTRIGLYCERIDEMIA: Status: RESOLVED | Noted: 2024-02-06 | Resolved: 2025-02-24

## 2025-02-24 PROCEDURE — 99214 OFFICE O/P EST MOD 30 MIN: CPT | Mod: S$GLB,,, | Performed by: NURSE PRACTITIONER

## 2025-02-24 PROCEDURE — 3008F BODY MASS INDEX DOCD: CPT | Mod: CPTII,S$GLB,, | Performed by: NURSE PRACTITIONER

## 2025-02-24 PROCEDURE — 99999 PR PBB SHADOW E&M-EST. PATIENT-LVL IV: CPT | Mod: PBBFAC,,, | Performed by: NURSE PRACTITIONER

## 2025-02-24 PROCEDURE — 3077F SYST BP >= 140 MM HG: CPT | Mod: CPTII,S$GLB,, | Performed by: NURSE PRACTITIONER

## 2025-02-24 PROCEDURE — 3080F DIAST BP >= 90 MM HG: CPT | Mod: CPTII,S$GLB,, | Performed by: NURSE PRACTITIONER

## 2025-02-24 PROCEDURE — 1160F RVW MEDS BY RX/DR IN RCRD: CPT | Mod: CPTII,S$GLB,, | Performed by: NURSE PRACTITIONER

## 2025-02-24 PROCEDURE — 1159F MED LIST DOCD IN RCRD: CPT | Mod: CPTII,S$GLB,, | Performed by: NURSE PRACTITIONER

## 2025-02-24 RX ORDER — HYDROCODONE BITARTRATE AND ACETAMINOPHEN 5; 325 MG/1; MG/1
1 TABLET ORAL 3 TIMES DAILY PRN
COMMUNITY
Start: 2025-01-20

## 2025-02-24 NOTE — PROGRESS NOTES
Subjective:      Patient ID: Crystal Greer is a 45 y.o. female.  Pt returns to clinic with increasing BP that began after starting epidural injections in neck and low back 2 months ago.    Hypertension  This is a new problem. The current episode started more than 1 month ago. The problem is unchanged. The problem is uncontrolled. Associated symptoms include malaise/fatigue, neck pain and peripheral edema. Pertinent negatives include no anxiety, blurred vision, chest pain, headaches, orthopnea, palpitations, PND, shortness of breath or sweats. Agents associated with hypertension include steroids. Risk factors for coronary artery disease include stress, sedentary lifestyle, dyslipidemia and post-menopausal state.     Review of Systems   Constitutional:  Positive for activity change, appetite change, fatigue, malaise/fatigue and unexpected weight change. Negative for chills, diaphoresis, fever and night sweats.   HENT: Negative.     Eyes:  Negative for blurred vision, pain and visual disturbance.   Respiratory:  Negative for chest tightness and shortness of breath.    Cardiovascular:  Negative for chest pain, palpitations, orthopnea and PND.   Gastrointestinal:  Negative for abdominal pain, change in bowel habit, constipation, diarrhea, nausea and vomiting.   Genitourinary:  Negative for difficulty urinating, dysuria and menstrual problem.   Musculoskeletal:  Positive for arthralgias, back pain, leg pain, myalgias, neck pain and neck stiffness. Negative for gait problem, joint swelling and joint deformity.   Integumentary:  Negative for color change and rash.   Allergic/Immunologic: Negative.    Neurological:  Positive for weakness. Negative for dizziness, vertigo, tremors, seizures, syncope, facial asymmetry, speech difficulty, light-headedness, numbness, headaches, memory loss and coordination difficulties.   Psychiatric/Behavioral: Negative.     All other systems reviewed and are negative.       "  Objective:     Vitals:    02/24/25 1123   BP: (!) 146/96   Pulse: 80   Resp: 16   Temp: 98.2 °F (36.8 °C)   TempSrc: Oral   SpO2: 97%   Weight: 70.6 kg (155 lb 10.3 oz)   Height: 4' 10" (1.473 m)     Physical Exam  Vitals and nursing note reviewed.   Constitutional:       General: She is not in acute distress.     Appearance: Normal appearance. She is well-developed and well-groomed. She is not ill-appearing.   HENT:      Head: Normocephalic and atraumatic.      Right Ear: External ear normal.      Left Ear: External ear normal.      Nose: Nose normal.      Mouth/Throat:      Lips: Pink.      Mouth: Mucous membranes are moist.   Eyes:      General: Lids are normal. Vision grossly intact. Gaze aligned appropriately.      Conjunctiva/sclera: Conjunctivae normal.      Pupils: Pupils are equal, round, and reactive to light.   Neck:      Trachea: Phonation normal.   Cardiovascular:      Rate and Rhythm: Normal rate and regular rhythm.      Heart sounds: Normal heart sounds.   Pulmonary:      Effort: Pulmonary effort is normal. No accessory muscle usage or respiratory distress.      Breath sounds: Normal breath sounds and air entry.   Abdominal:      General: Abdomen is flat. Bowel sounds are normal. There is no distension.      Palpations: Abdomen is soft.      Tenderness: There is no abdominal tenderness.   Musculoskeletal:      Cervical back: Neck supple.      Right lower leg: No edema.      Left lower leg: No edema.   Skin:     General: Skin is warm and dry.      Findings: No rash.   Neurological:      General: No focal deficit present.      Mental Status: She is alert and oriented to person, place, and time. Mental status is at baseline.      Sensory: Sensation is intact.      Motor: Motor function is intact.      Coordination: Coordination is intact.      Gait: Gait is intact.   Psychiatric:         Attention and Perception: Attention and perception normal.         Mood and Affect: Mood and affect normal.         " Speech: Speech normal.         Behavior: Behavior normal. Behavior is cooperative.         Thought Content: Thought content normal.         Cognition and Memory: Cognition and memory normal.         Judgment: Judgment normal.       Assessment and Plan:     1. Elevated blood pressure reading (Primary)  Patient Education: Reviewed risks of hypertension and principles of treatment.  Medication: pending lab results.  Screening labs for initial evaluation: basic metabolic panel, blood sugar, calcium, C-reactive protein, creatinine, hematocrit, lipid panel, and potassium.  Screening for causes of secondary hypertension: GFR (chronic kidney disease) and TSH (thyroid disease).  Dietary sodium restriction.  Regular aerobic exercise.  Check blood pressures daily and record.  Follow up: 2 weeks and as needed.  - CBC Auto Differential; Future  - Comprehensive Metabolic Panel; Future  - Ferritin; Future  - Folate; Future  - Hemoglobin A1C; Future  - Magnesium; Future  - Lipid Panel; Future  - Iron and TIBC; Future  - Vitamin D; Future  - Vitamin B12; Future  - TSH; Future  - T4, Free; Future    2. Mixed hyperlipidemia  Continue dietary measures.  Continue regular exercise.  Lipid-lowering medications:  continue statin daily .  - CBC Auto Differential; Future  - Comprehensive Metabolic Panel; Future  - Ferritin; Future  - Folate; Future  - Hemoglobin A1C; Future  - Magnesium; Future  - Lipid Panel; Future  - Iron and TIBC; Future  - Vitamin D; Future  - Vitamin B12; Future  - TSH; Future  - T4, Free; Future    3. Cervical spinal cord compression  Completed epidural steroid injections awaiting facet medial branch block with possible decompression at a later date, continue neurosurgery care     4. Sacroiliitis  No acute red flags, has scheduled epidural injections, continue neurosurgery care  - add more outdoor lighting  - always use handrails on the stairs  - always wear low-heeled or flat shoes or slippers with nonskid soles  -  call the doctor if I am feeling too drowsy  - install bathroom grab bars  - join an exercise group in my community  - keep a flashlight by the bed  - keep my cell phone with me always  - learn how to get back up if I fall  - make an emergency alert plan in case I fall  -  clutter from the floors  - use a nonslip pad with throw rugs, or remove them completely  - use a cane or walker  - use a nightlight in the bathroom  - wear my glasses and/or hearing aid  - attend therapy    5. Colon cancer screening  - Cologuard Screening (Multitarget Stool DNA); Future  - Cologuard Screening (Multitarget Stool DNA)           RAYMON Marte, FNP-C  Family/Internal Medicine  [facility]

## 2025-02-25 ENCOUNTER — PATIENT MESSAGE (OUTPATIENT)
Dept: FAMILY MEDICINE | Facility: CLINIC | Age: 45
End: 2025-02-25
Payer: COMMERCIAL

## 2025-02-25 DIAGNOSIS — E87.6 HYPOKALEMIA: Primary | ICD-10-CM

## 2025-02-25 DIAGNOSIS — E78.00 PURE HYPERCHOLESTEROLEMIA: ICD-10-CM

## 2025-02-25 DIAGNOSIS — E55.9 VITAMIN D DEFICIENCY: ICD-10-CM

## 2025-02-25 RX ORDER — POTASSIUM CHLORIDE 750 MG/1
10 CAPSULE, EXTENDED RELEASE ORAL DAILY
Qty: 30 CAPSULE | Refills: 11 | Status: SHIPPED | OUTPATIENT
Start: 2025-02-25

## 2025-03-12 ENCOUNTER — OFFICE VISIT (OUTPATIENT)
Dept: FAMILY MEDICINE | Facility: CLINIC | Age: 45
End: 2025-03-12
Payer: COMMERCIAL

## 2025-03-12 VITALS
HEIGHT: 58 IN | OXYGEN SATURATION: 96 % | DIASTOLIC BLOOD PRESSURE: 92 MMHG | WEIGHT: 154.56 LBS | HEART RATE: 85 BPM | RESPIRATION RATE: 16 BRPM | TEMPERATURE: 99 F | BODY MASS INDEX: 32.44 KG/M2 | SYSTOLIC BLOOD PRESSURE: 144 MMHG

## 2025-03-12 DIAGNOSIS — I10 PRIMARY HYPERTENSION: Primary | ICD-10-CM

## 2025-03-12 DIAGNOSIS — G95.20 CERVICAL SPINAL CORD COMPRESSION: ICD-10-CM

## 2025-03-12 DIAGNOSIS — E78.2 MIXED HYPERLIPIDEMIA: ICD-10-CM

## 2025-03-12 DIAGNOSIS — M54.50 CHRONIC RIGHT-SIDED LOW BACK PAIN WITHOUT SCIATICA: ICD-10-CM

## 2025-03-12 DIAGNOSIS — F32.9 REACTIVE DEPRESSION: ICD-10-CM

## 2025-03-12 DIAGNOSIS — G89.29 CHRONIC RIGHT-SIDED LOW BACK PAIN WITHOUT SCIATICA: ICD-10-CM

## 2025-03-12 PROCEDURE — 3044F HG A1C LEVEL LT 7.0%: CPT | Mod: CPTII,S$GLB,, | Performed by: NURSE PRACTITIONER

## 2025-03-12 PROCEDURE — 1159F MED LIST DOCD IN RCRD: CPT | Mod: CPTII,S$GLB,, | Performed by: NURSE PRACTITIONER

## 2025-03-12 PROCEDURE — 99214 OFFICE O/P EST MOD 30 MIN: CPT | Mod: S$GLB,,, | Performed by: NURSE PRACTITIONER

## 2025-03-12 PROCEDURE — 3008F BODY MASS INDEX DOCD: CPT | Mod: CPTII,S$GLB,, | Performed by: NURSE PRACTITIONER

## 2025-03-12 PROCEDURE — 99999 PR PBB SHADOW E&M-EST. PATIENT-LVL IV: CPT | Mod: PBBFAC,,, | Performed by: NURSE PRACTITIONER

## 2025-03-12 PROCEDURE — 3080F DIAST BP >= 90 MM HG: CPT | Mod: CPTII,S$GLB,, | Performed by: NURSE PRACTITIONER

## 2025-03-12 PROCEDURE — 1160F RVW MEDS BY RX/DR IN RCRD: CPT | Mod: CPTII,S$GLB,, | Performed by: NURSE PRACTITIONER

## 2025-03-12 PROCEDURE — 3077F SYST BP >= 140 MM HG: CPT | Mod: CPTII,S$GLB,, | Performed by: NURSE PRACTITIONER

## 2025-03-12 RX ORDER — GABAPENTIN 300 MG/1
300 CAPSULE ORAL NIGHTLY
Qty: 90 CAPSULE | Refills: 1 | Status: SHIPPED | OUTPATIENT
Start: 2025-03-12

## 2025-03-12 RX ORDER — ROSUVASTATIN CALCIUM 40 MG/1
40 TABLET, COATED ORAL NIGHTLY
Qty: 90 TABLET | Refills: 1 | Status: SHIPPED | OUTPATIENT
Start: 2025-03-12

## 2025-03-12 RX ORDER — DULOXETIN HYDROCHLORIDE 30 MG/1
30 CAPSULE, DELAYED RELEASE ORAL DAILY
Qty: 90 CAPSULE | Refills: 1 | Status: SHIPPED | OUTPATIENT
Start: 2025-03-12 | End: 2025-04-09

## 2025-03-12 RX ORDER — BUPROPION HYDROCHLORIDE 150 MG/1
150 TABLET ORAL DAILY
Qty: 90 TABLET | Refills: 1 | Status: SHIPPED | OUTPATIENT
Start: 2025-03-12

## 2025-03-12 RX ORDER — METHOCARBAMOL 750 MG/1
1500 TABLET, FILM COATED ORAL
Qty: 90 TABLET | Refills: 1 | Status: SHIPPED | OUTPATIENT
Start: 2025-03-12

## 2025-03-12 RX ORDER — AMLODIPINE BESYLATE 5 MG/1
5 TABLET ORAL DAILY
Qty: 90 TABLET | Refills: 1 | Status: SHIPPED | OUTPATIENT
Start: 2025-03-12 | End: 2025-04-09

## 2025-03-13 ENCOUNTER — TELEPHONE (OUTPATIENT)
Dept: FAMILY MEDICINE | Facility: CLINIC | Age: 45
End: 2025-03-13
Payer: COMMERCIAL

## 2025-03-13 ENCOUNTER — PATIENT MESSAGE (OUTPATIENT)
Dept: FAMILY MEDICINE | Facility: CLINIC | Age: 45
End: 2025-03-13
Payer: COMMERCIAL

## 2025-03-13 DIAGNOSIS — M54.50 CHRONIC RIGHT-SIDED LOW BACK PAIN WITHOUT SCIATICA: ICD-10-CM

## 2025-03-13 DIAGNOSIS — G95.20 CERVICAL SPINAL CORD COMPRESSION: ICD-10-CM

## 2025-03-13 DIAGNOSIS — G89.29 CHRONIC RIGHT-SIDED LOW BACK PAIN WITHOUT SCIATICA: ICD-10-CM

## 2025-03-13 NOTE — TELEPHONE ENCOUNTER
Called [facility] to clarify the medication dosage with frequency. Insurance is kicking it back due to max mg in a 24 hr period due to 4-6 hr PRN. Provided a verbal for 2 tabs by mouth every 8 hrs as needed.

## 2025-03-13 NOTE — TELEPHONE ENCOUNTER
----- Message from Joan sent at 3/13/2025  9:01 AM CDT -----  Regarding: [facility]  Name of Caller: Zaki Name: [id number] - [place] - [address] [facility] AT [address] [place] [id number] [id number]: [phone] Fax: [phone] Name: methocarbamoL (ROBAXIN) 750 MG TabWhat do they need to clarify? Its coming back high dosageCan you be contacted via [facility]?Best Call Back Number: [phone] Information:

## 2025-03-17 ENCOUNTER — PATIENT MESSAGE (OUTPATIENT)
Dept: FAMILY MEDICINE | Facility: CLINIC | Age: 45
End: 2025-03-17
Payer: COMMERCIAL

## 2025-03-31 ENCOUNTER — PATIENT MESSAGE (OUTPATIENT)
Dept: ADMINISTRATIVE | Facility: HOSPITAL | Age: 45
End: 2025-03-31
Payer: COMMERCIAL

## 2025-04-04 VITALS — SYSTOLIC BLOOD PRESSURE: 140 MMHG | DIASTOLIC BLOOD PRESSURE: 110 MMHG

## 2025-04-07 NOTE — PROGRESS NOTES
Subjective:      Patient ID: Crystal Greer is a 45 y.o. female.  Pt returns to clinic with persistently elevated BP which has been worsened by chronic back pain for which she remains under the care of , pain management.     Hypertension  This is a new problem. The current episode started more than 1 month ago. The problem has been gradually worsening since onset. The problem is uncontrolled. Associated symptoms include anxiety, headaches, malaise/fatigue, neck pain and palpitations. Pertinent negatives include no blurred vision, chest pain, orthopnea, peripheral edema, PND, shortness of breath or sweats. Agents associated with hypertension include NSAIDs and steroids. Risk factors for coronary artery disease include dyslipidemia, obesity, sedentary lifestyle and stress. Past treatments include lifestyle changes. The current treatment provides no improvement. There is no history of angina, kidney disease, CAD/MI, CVA, heart failure, left ventricular hypertrophy, PVD or retinopathy. Identifiable causes of hypertension include a hypertension causing med. There is no history of chronic renal disease, coarctation of the aorta, hyperaldosteronism, hypercortisolism, hyperparathyroidism, pheochromocytoma, renovascular disease, sleep apnea or a thyroid problem.     Review of Systems   Constitutional:  Positive for fatigue and malaise/fatigue. Negative for activity change, appetite change, chills, diaphoresis, fever, night sweats and unexpected weight change.   HENT: Negative.     Eyes:  Negative for blurred vision, pain and visual disturbance.   Respiratory:  Negative for chest tightness and shortness of breath.    Cardiovascular:  Positive for palpitations. Negative for chest pain, orthopnea, leg swelling, PND and claudication.   Gastrointestinal:  Negative for abdominal pain, change in bowel habit, constipation, diarrhea, nausea, vomiting and reflux.   Genitourinary:  Negative for difficulty urinating,  "dysuria and menstrual problem.   Musculoskeletal:  Positive for arthralgias, back pain, leg pain, myalgias, neck pain and neck stiffness. Negative for gait problem, joint swelling and joint deformity.   Integumentary:  Negative for color change and rash.   Allergic/Immunologic: Negative.    Neurological:  Positive for weakness and headaches. Negative for dizziness, vertigo, tremors, seizures, syncope, facial asymmetry, speech difficulty, light-headedness, numbness and coordination difficulties.   Psychiatric/Behavioral:  Positive for depressed mood, dysphoric mood and sleep disturbance. Negative for agitation, behavioral problems, confusion, decreased concentration, hallucinations, self-injury and suicidal ideas. The patient is nervous/anxious. The patient is not hyperactive.    All other systems reviewed and are negative.        Objective:     Vitals:    03/12/25 1451   BP: (!) 144/92   Pulse: 85   Resp: 16   Temp: 98.8 °F (37.1 °C)   TempSrc: Oral   SpO2: 96%   Weight: 70.1 kg (154 lb 8.7 oz)   Height: 4' 10" (1.473 m)     Physical Exam  Vitals and nursing note reviewed.   Constitutional:       General: She is not in acute distress.     Appearance: Normal appearance. She is well-developed and well-groomed. She is not ill-appearing.   HENT:      Head: Normocephalic and atraumatic.      Right Ear: External ear normal.      Left Ear: External ear normal.      Nose: Nose normal.      Mouth/Throat:      Lips: Pink.      Mouth: Mucous membranes are moist.   Eyes:      General: Lids are normal. Vision grossly intact. Gaze aligned appropriately.      Conjunctiva/sclera: Conjunctivae normal.      Pupils: Pupils are equal, round, and reactive to light.   Neck:      Trachea: Phonation normal.   Cardiovascular:      Rate and Rhythm: Normal rate and regular rhythm.      Heart sounds: Normal heart sounds.   Pulmonary:      Effort: Pulmonary effort is normal. No accessory muscle usage or respiratory distress.      Breath sounds: " Normal breath sounds and air entry.   Abdominal:      General: Abdomen is flat. Bowel sounds are normal. There is no distension.      Palpations: Abdomen is soft.      Tenderness: There is no abdominal tenderness.   Musculoskeletal:      Cervical back: Neck supple.      Right lower leg: No edema.      Left lower leg: No edema.   Skin:     General: Skin is warm and dry.      Findings: No rash.   Neurological:      General: No focal deficit present.      Mental Status: She is alert and oriented to person, place, and time. Mental status is at baseline.      Sensory: Sensation is intact.      Motor: Motor function is intact.      Coordination: Coordination is intact.      Gait: Gait is intact.   Psychiatric:         Attention and Perception: Attention and perception normal.         Mood and Affect: Mood and affect normal.         Speech: Speech normal.         Behavior: Behavior normal. Behavior is cooperative.         Thought Content: Thought content normal.         Cognition and Memory: Cognition and memory normal.         Judgment: Judgment normal.       Assessment and Plan:     1. Primary hypertension (Primary)  Medication: begin CCB daily.  Dietary sodium restriction.  Regular aerobic exercise.  Check blood pressures 2-3 times daily and record.  Follow up: 2 weeks and as needed.  - amLODIPine (NORVASC) 5 MG tablet; Take 1 tablet (5 mg total) by mouth once daily.  Dispense: 90 tablet; Refill: 1    2. Mixed hyperlipidemia  Continue dietary measures.  Continue regular exercise.  Lipid-lowering medications:  continue statin daily .  - rosuvastatin (CRESTOR) 40 MG Tab; Take 1 tablet (40 mg total) by mouth every evening.  Dispense: 90 tablet; Refill: 1    3. Chronic right-sided low back pain without sciatica  Neurovascularly intact without acute sensory or motor deficit, continue chronic pain regimen and neurosurgery care   Proper lifting, bending technique discussed.  Stretching exercises discussed.  Regular aerobic and  trunk strengthening exercises discussed.  Ice to affected area as needed for local pain relief.  Heat to affected area as needed for local pain relief.   - DULoxetine (CYMBALTA) 30 MG capsule; Take 1 capsule (30 mg total) by mouth once daily.  Dispense: 90 capsule; Refill: 1  - gabapentin (NEURONTIN) 300 MG capsule; Take 1 capsule (300 mg total) by mouth every evening.  Dispense: 90 capsule; Refill: 1  - methocarbamoL (ROBAXIN) 750 MG Tab; Take 2 tablets (1,500 mg total) by mouth every 4 to 6 hours as needed.  Dispense: 90 tablet; Refill: 1    4. Cervical spinal cord compression  Neurovascularly intact without acute sensory or motor deficit, continue chronic pain regimen and neurosurgery care   Proper lifting, bending technique discussed.  Stretching exercises discussed.  Regular aerobic and trunk strengthening exercises discussed.  Ice to affected area as needed for local pain relief.  Heat to affected area as needed for local pain relief.   - DULoxetine (CYMBALTA) 30 MG capsule; Take 1 capsule (30 mg total) by mouth once daily.  Dispense: 90 capsule; Refill: 1  - gabapentin (NEURONTIN) 300 MG capsule; Take 1 capsule (300 mg total) by mouth every evening.  Dispense: 90 capsule; Refill: 1  - methocarbamoL (ROBAXIN) 750 MG Tab; Take 2 tablets (1,500 mg total) by mouth every 4 to 6 hours as needed.  Dispense: 90 tablet; Refill: 1    5. Reactive depression  Continue wellbutrin and add duloxetine to help with pain control  Instructed patient to contact office or on-call physician promptly should condition worsen or any new symptoms appear and provided on-call telephone numbers. IF THE PATIENT HAS ANY SUICIDAL OR HOMICIDAL IDEATIONS, CALL THE OFFICE, DISCUSS WITH A SUPPORT MEMBER, OR GO TO THE ER IMMEDIATELY. Patient was agreeable with this plan.  - DULoxetine (CYMBALTA) 30 MG capsule; Take 1 capsule (30 mg total) by mouth once daily.  Dispense: 90 capsule; Refill: 1  - buPROPion (WELLBUTRIN XL) 150 MG TB24 tablet; Take  1 tablet (150 mg total) by mouth once daily.  Dispense: 90 tablet; Refill: 1           RAYMON Marte, FNP-C  Family/Internal Medicine  [facility]

## 2025-04-09 ENCOUNTER — OFFICE VISIT (OUTPATIENT)
Dept: FAMILY MEDICINE | Facility: CLINIC | Age: 45
End: 2025-04-09
Payer: COMMERCIAL

## 2025-04-09 ENCOUNTER — PATIENT OUTREACH (OUTPATIENT)
Dept: ADMINISTRATIVE | Facility: HOSPITAL | Age: 45
End: 2025-04-09
Payer: COMMERCIAL

## 2025-04-09 DIAGNOSIS — G89.29 CHRONIC RIGHT-SIDED LOW BACK PAIN WITHOUT SCIATICA: ICD-10-CM

## 2025-04-09 DIAGNOSIS — I10 PRIMARY HYPERTENSION: ICD-10-CM

## 2025-04-09 DIAGNOSIS — F32.9 REACTIVE DEPRESSION: ICD-10-CM

## 2025-04-09 DIAGNOSIS — G95.20 CERVICAL SPINAL CORD COMPRESSION: ICD-10-CM

## 2025-04-09 DIAGNOSIS — M54.50 CHRONIC RIGHT-SIDED LOW BACK PAIN WITHOUT SCIATICA: ICD-10-CM

## 2025-04-09 DIAGNOSIS — F41.9 ANXIETY: Primary | ICD-10-CM

## 2025-04-09 RX ORDER — AMLODIPINE BESYLATE 10 MG/1
10 TABLET ORAL DAILY
Qty: 90 TABLET | Refills: 0 | Status: SHIPPED | OUTPATIENT
Start: 2025-04-09 | End: 2026-04-09

## 2025-04-09 RX ORDER — DULOXETIN HYDROCHLORIDE 60 MG/1
60 CAPSULE, DELAYED RELEASE ORAL DAILY
Qty: 90 CAPSULE | Refills: 0 | Status: SHIPPED | OUTPATIENT
Start: 2025-04-09 | End: 2026-04-09

## 2025-04-09 NOTE — PROGRESS NOTES
SUBJECTIVE     No chief complaint on file.      HPI  Crystal Greer is a 45 y.o. female with multiple medical diagnoses as listed in the medical history and problem list that presents for evaluation for HTN. Her BP has been all over the place for the past 2 months. Pt has been doing  okay  since last visit. she is fully compliant with meds and denies any adverse side effects. Pt is  compliant  with a low Na diet and does not exercise, but walks on the job during her shifts. Pt does check her BP at home with readings of 135-152/. Pt presents for med refills today and is without any other complaints.     PAST MEDICAL HISTORY:  Past Medical History:   Diagnosis Date    Headache        PAST SURGICAL HISTORY:  Past Surgical History:   Procedure Laterality Date     SECTION      COSMETIC SURGERY  2013    Breast reduction    HYSTERECTOMY      TOTAL REDUCTION MAMMOPLASTY      TUBAL LIGATION         SOCIAL HISTORY:  Social History[1]    FAMILY HISTORY:  Family History   Problem Relation Name Age of Onset    Hypertension Mother      No Known Problems Father      No Known Problems Sister      No Known Problems Brother      Diabetes Maternal Grandmother grandmother sanju     Stroke Maternal Grandmother grandmother sanju     Aneurysm Maternal Grandfather      Dementia Paternal Grandmother         ALLERGIES AND MEDICATIONS: updated and reviewed.  Review of patient's allergies indicates:   Allergen Reactions    Codeine Hives     Current Medications[2]    ROS  Review of Systems   Constitutional:  Negative for chills and fever.   HENT:  Negative for hearing loss and sore throat.    Eyes:  Negative for visual disturbance.   Respiratory:  Negative for cough and shortness of breath.    Cardiovascular:  Negative for chest pain, palpitations and leg swelling.   Gastrointestinal:  Negative for abdominal pain, constipation, diarrhea, nausea and vomiting.   Genitourinary:  Negative for dysuria, frequency and urgency.    Musculoskeletal:  Positive for neck pain. Negative for arthralgias, joint swelling and myalgias.   Skin:  Negative for rash and wound.   Neurological:  Negative for headaches.   Psychiatric/Behavioral:  Negative for agitation and confusion. The patient is nervous/anxious.          OBJECTIVE     Physical Exam  There were no vitals filed for this visit. There is no height or weight on file to calculate BMI.            Physical Exam  Constitutional:       General: She is not in acute distress.     Appearance: She is well-developed.   HENT:      Head: Normocephalic and atraumatic.      Right Ear: External ear normal.      Left Ear: External ear normal.      Nose: Nose normal.   Eyes:      General: No scleral icterus.        Right eye: No discharge.         Left eye: No discharge.      Conjunctiva/sclera: Conjunctivae normal.   Neck:      Vascular: No JVD.      Trachea: No tracheal deviation.   Pulmonary:      Effort: Pulmonary effort is normal. No respiratory distress.   Musculoskeletal:         General: No deformity. Normal range of motion.      Cervical back: Normal range of motion and neck supple.   Skin:     General: Skin is dry.      Findings: No erythema or rash.   Neurological:      Mental Status: She is alert and oriented to person, place, and time.      Motor: No abnormal muscle tone.      Coordination: Coordination normal.   Psychiatric:         Behavior: Behavior normal.         Thought Content: Thought content normal.         Judgment: Judgment normal.           Health Maintenance         Date Due Completion Date    TETANUS VACCINE Never done ---    COVID-19 Vaccine (5 - 2024-25 season) 09/01/2024 11/29/2022    Mammogram 07/15/2025 7/15/2024    Hemoglobin A1c (Diabetic Prevention Screening) 02/24/2028 2/24/2025    Colorectal Cancer Screening 03/12/2028 3/12/2025    Lipid Panel 02/24/2030 2/24/2025    RSV Vaccine (Age 60+ and Pregnant patients) (1 - 1-dose 75+ series) 02/04/2055 ---              ASSESSMENT      45 y.o. female with     1. Anxiety    2. Primary hypertension    3. Chronic right-sided low back pain without sciatica    4. Cervical spinal cord compression    5. Reactive depression        PLAN:     1. Primary hypertension  - BP elevated above goal of <140/90  - increase Norvasc from 5 mg to 10 mg  -  Advised to maintain a low Na diet(<2g/day), exercise, and keep BP log to present to next visit  - amLODIPine (NORVASC) 10 MG tablet; Take 1 tablet (10 mg total) by mouth once daily.  Dispense: 90 tablet; Refill: 0    2. Chronic right-sided low back pain without sciatica  - will increase Cymbalta from 30 mg to 60 mg in an effort to improve pain and get better control of anxiety  - DULoxetine (CYMBALTA) 60 MG capsule; Take 1 capsule (60 mg total) by mouth once daily.  Dispense: 90 capsule; Refill: 0    3. Cervical spinal cord compression  - as above  - DULoxetine (CYMBALTA) 60 MG capsule; Take 1 capsule (60 mg total) by mouth once daily.  Dispense: 90 capsule; Refill: 0    4. Reactive depression  - as above  - DULoxetine (CYMBALTA) 60 MG capsule; Take 1 capsule (60 mg total) by mouth once daily.  Dispense: 90 capsule; Refill: 0    5. Anxiety  - as above  - DULoxetine (CYMBALTA) 60 MG capsule; Take 1 capsule (60 mg total) by mouth once daily.  Dispense: 90 capsule; Refill: 0          RTC in 1-2 weeks as needed for any acute worsening of current condition or failure to improve       The patient location is: [place]  The chief complaint leading to consultation is: HTN and anxiety management    Visit type: audiovisual    Face to Face time with patient: 14 min  16 minutes of total time spent on the encounter, which includes face to face time and non-face to face time preparing to see the patient (eg, review of tests), Obtaining and/or reviewing separately obtained history, Documenting clinical information in the electronic or other health record, Independently interpreting results (not separately reported) and communicating  results to the patient/family/caregiver, or Care coordination (not separately reported).         Each patient to whom he or she provides medical services by telemedicine is:  (1) informed of the relationship between the physician and patient and the respective role of any other health care provider with respect to management of the patient; and (2) notified that he or she may decline to receive medical services by telemedicine and may withdraw from such care at any time.    Notes:       Isha Espinal MD  04/09/2025 1:22 PM        No follow-ups on file.                              [1]   Social History  Socioeconomic History    Marital status: Single   Tobacco Use    Smoking status: Never    Smokeless tobacco: Never   Substance and Sexual Activity    Alcohol use: Not Currently     Alcohol/week: 1.0 standard drink of alcohol     Types: 1 Glasses of wine per week     Comment: occasionally    Drug use: Never    Sexual activity: Yes     Partners: Male     Birth control/protection: See Surgical Hx     Social Drivers of Health     Financial Resource Strain: Low Risk  (2/18/2025)    Overall Financial Resource Strain (CARDIA)     Difficulty of Paying Living Expenses: Not hard at all   Food Insecurity: No Food Insecurity (2/18/2025)    Hunger Vital Sign     Worried About Running Out of Food in the Last Year: Never true     Ran Out of Food in the Last Year: Never true   Transportation Needs: No Transportation Needs (2/18/2025)    PRAPARE - Transportation     Lack of Transportation (Medical): No     Lack of Transportation (Non-Medical): No   Physical Activity: Unknown (2/18/2025)    Exercise Vital Sign     Days of Exercise per Week: 5 days   Stress: Stress Concern Present (2/18/2025)    [employer] [place] [employer] - Occupational Stress Questionnaire     Feeling of Stress : Very much   Housing Stability: Low Risk  (2/18/2025)    Housing Stability Vital Sign     Unable to Pay for Housing in the Last Year: No      Homeless in the Last Year: No   [2]   Current Outpatient Medications   Medication Sig Dispense Refill    amLODIPine (NORVASC) 10 MG tablet Take 1 tablet (10 mg total) by mouth once daily. 90 tablet 0    buPROPion (WELLBUTRIN XL) 150 MG TB24 tablet Take 1 tablet (150 mg total) by mouth once daily. 90 tablet 1    DULoxetine (CYMBALTA) 60 MG capsule Take 1 capsule (60 mg total) by mouth once daily. 90 capsule 0    gabapentin (NEURONTIN) 300 MG capsule Take 1 capsule (300 mg total) by mouth every evening. 90 capsule 1    HYDROcodone-acetaminophen (NORCO) 5-325 mg per tablet Take 1 tablet by mouth 3 (three) times daily as needed.      methocarbamoL (ROBAXIN) 750 MG Tab Take 2 tablets (1,500 mg total) by mouth every 4 to 6 hours as needed. 90 tablet 1    omega-3s-dha-epa-fish oil-D3 1,250 mg-1,375 mg-25 mcg Cap Take 1 capsule by mouth Daily. (Patient not taking: Reported on 3/12/2025) 30 capsule 11    potassium chloride (MICRO-K) 10 MEQ CpSR Take 1 capsule (10 mEq total) by mouth once daily. 30 capsule 11    rosuvastatin (CRESTOR) 40 MG Tab Take 1 tablet (40 mg total) by mouth every evening. 90 tablet 1     No current facility-administered medications for this visit.

## 2025-04-16 ENCOUNTER — TELEPHONE (OUTPATIENT)
Dept: FAMILY MEDICINE | Facility: CLINIC | Age: 45
End: 2025-04-16
Payer: COMMERCIAL

## 2025-04-16 NOTE — TELEPHONE ENCOUNTER
----- Message from Jatin sent at 2025  1:46 PM CDT -----  [phone]  Type: Patient Call Back Who called: Self What is the request in detail: pt received injections on yesterday from there neurologist, and is worried they are having a negative reaction. Pt did follow up wit the neurologist on today and was unsatisfied with the visit and did not receive clarity on questions asked. Pt would like to speak with someone about her concerns.  Can the clinic reply by KEK[facility] ELLEN? Would the patient rather a call back or a response via [facility]? Call back Best call back number: [phone] Additional Information:

## 2025-04-16 NOTE — TELEPHONE ENCOUNTER
Patient was seen by her pain management yesterday. States had steroid injections to lower back and upper back and ablation. Patient complain of fevers last night and today. Followed up with pain management today and the physician stated the lymph node near the neck is inflamed and raised, the physician advised the patient to follow up with PCP she may have an infection.     Patient denies cold like symptoms or being exposed to any one ill.  Patient is requesting an appointment, agreed to 1120 tomorrow with .

## 2025-04-17 ENCOUNTER — OFFICE VISIT (OUTPATIENT)
Dept: FAMILY MEDICINE | Facility: CLINIC | Age: 45
End: 2025-04-17
Payer: COMMERCIAL

## 2025-04-17 VITALS
BODY MASS INDEX: 32.35 KG/M2 | HEART RATE: 86 BPM | SYSTOLIC BLOOD PRESSURE: 122 MMHG | TEMPERATURE: 98 F | DIASTOLIC BLOOD PRESSURE: 88 MMHG | OXYGEN SATURATION: 99 % | WEIGHT: 154.13 LBS | HEIGHT: 58 IN

## 2025-04-17 DIAGNOSIS — I10 PRIMARY HYPERTENSION: ICD-10-CM

## 2025-04-17 DIAGNOSIS — R59.0 POSTERIOR CERVICAL LYMPHADENOPATHY: Primary | ICD-10-CM

## 2025-04-17 PROCEDURE — 99999 PR PBB SHADOW E&M-EST. PATIENT-LVL IV: CPT | Mod: PBBFAC,,, | Performed by: INTERNAL MEDICINE

## 2025-04-17 RX ORDER — DOXYCYCLINE HYCLATE 100 MG
100 TABLET ORAL 2 TIMES DAILY
Qty: 14 TABLET | Refills: 0 | Status: SHIPPED | OUTPATIENT
Start: 2025-04-17 | End: 2025-04-24

## 2025-04-17 NOTE — PROGRESS NOTES
SUBJECTIVE     Chief Complaint   Patient presents with    Lump       HPI  Crystal Greer is a 45 y.o. female with multiple medical diagnoses as listed in the medical history and problem list that presents for evaluation of a lump to the R neck. Pt had procedures done on Tuesday such that she received 2 steroid injections to the L neck and an ablation. The following day she woke up with a golf ball sized swelling to the R neck. It was very tender and painful and associated with a fever on Tuesday. Pt did reach out to the Pain Medicine team, but was re-directed to her PCP. The pt did go home and take ASA and Benadryl. Today, swelling is down considerably, but it remains TTP. Pt denies any fever, chills, or night sweats overnight.     PAST MEDICAL HISTORY:  Past Medical History:   Diagnosis Date    Headache        PAST SURGICAL HISTORY:  Past Surgical History:   Procedure Laterality Date     SECTION      COSMETIC SURGERY  2013    Breast reduction    HYSTERECTOMY      TOTAL REDUCTION MAMMOPLASTY      TUBAL LIGATION         SOCIAL HISTORY:  Social History[1]    FAMILY HISTORY:  Family History   Problem Relation Name Age of Onset    Hypertension Mother      No Known Problems Father      No Known Problems Sister      No Known Problems Brother      Diabetes Maternal Grandmother grandmother sanju     Stroke Maternal Grandmother grandmother sanju     Aneurysm Maternal Grandfather      Dementia Paternal Grandmother         ALLERGIES AND MEDICATIONS: updated and reviewed.  Review of patient's allergies indicates:   Allergen Reactions    Codeine Hives     Current Medications[2]    ROS  Review of Systems   Constitutional:  Negative for chills and fever.   HENT:  Negative for hearing loss and sore throat.    Eyes:  Negative for visual disturbance.   Respiratory:  Negative for cough and shortness of breath.    Cardiovascular:  Negative for chest pain, palpitations and leg swelling.   Gastrointestinal:  Negative for  "abdominal pain, constipation, diarrhea, nausea and vomiting.   Genitourinary:  Negative for dysuria, frequency and urgency.   Musculoskeletal:  Positive for neck pain. Negative for arthralgias, joint swelling and myalgias.        R neck swelling   Skin:  Negative for rash and wound.   Neurological:  Negative for headaches.   Psychiatric/Behavioral:  Negative for agitation and confusion. The patient is not nervous/anxious.          OBJECTIVE     Physical Exam  Vitals:    04/17/25 1135   BP: 122/88   Pulse: 86   Temp: 98.3 °F (36.8 °C)    Body mass index is 32.21 kg/m².  Weight: 69.9 kg (154 lb 1.6 oz)   Height: 4' 10" (147.3 cm)     Physical Exam  Constitutional:       General: She is not in acute distress.     Appearance: She is well-developed.   HENT:      Head: Normocephalic and atraumatic.      Right Ear: External ear normal.      Left Ear: External ear normal.      Nose: Nose normal.   Eyes:      General: No scleral icterus.        Right eye: No discharge.         Left eye: No discharge.      Conjunctiva/sclera: Conjunctivae normal.   Neck:      Vascular: No JVD.      Trachea: No tracheal deviation.   Pulmonary:      Effort: Pulmonary effort is normal. No respiratory distress.   Musculoskeletal:         General: No tenderness or deformity. Normal range of motion.      Cervical back: Normal range of motion and neck supple.   Lymphadenopathy:      Cervical: Cervical adenopathy present.      Right cervical: Posterior cervical adenopathy present.   Skin:     General: Skin is warm and dry.      Findings: No erythema or rash.   Neurological:      Mental Status: She is alert and oriented to person, place, and time.      Motor: No abnormal muscle tone.      Coordination: Coordination normal.   Psychiatric:         Behavior: Behavior normal.         Thought Content: Thought content normal.         Judgment: Judgment normal.           Health Maintenance         Date Due Completion Date    TETANUS VACCINE Never done ---    " COVID-19 Vaccine (5 - 2024-25 season) 09/01/2024 11/29/2022    Mammogram 07/15/2025 7/15/2024    Hemoglobin A1c (Diabetic Prevention Screening) 02/24/2028 2/24/2025    Colorectal Cancer Screening 03/12/2028 3/12/2025    Lipid Panel 02/24/2030 2/24/2025    RSV Vaccine (Age 60+ and Pregnant patients) (1 - 1-dose 75+ series) 02/04/2055 ---              ASSESSMENT     45 y.o. female with     1. Posterior cervical lymphadenopathy    2. Primary hypertension        PLAN:     1. Posterior cervical lymphadenopathy  - Pt advised to take Abx to completion for likely reactive lymph node  - doxycycline (VIBRA-TABS) 100 MG tablet; Take 1 tablet (100 mg total) by mouth 2 (two) times daily. for 7 days  Dispense: 14 tablet; Refill: 0    2. Primary hypertension  - BP well controlled; at goal of <140/90  - The current medical regimen is effective;  continue present plan and medications.        RTC in 1-2 weeks as needed for any acute worsening of current condition or failure to improve       Isha Espinal MD  04/17/2025 11:42 AM        No follow-ups on file.                     [1]   Social History  Socioeconomic History    Marital status: Single   Tobacco Use    Smoking status: Never    Smokeless tobacco: Never   Substance and Sexual Activity    Alcohol use: Not Currently     Alcohol/week: 1.0 standard drink of alcohol     Types: 1 Glasses of wine per week     Comment: occasionally    Drug use: Never    Sexual activity: Yes     Partners: Male     Birth control/protection: See Surgical Hx     Social Drivers of Health     Financial Resource Strain: Low Risk  (2/18/2025)    Overall Financial Resource Strain (CARDIA)     Difficulty of Paying Living Expenses: Not hard at all   Food Insecurity: No Food Insecurity (2/18/2025)    Hunger Vital Sign     Worried About Running Out of Food in the Last Year: Never true     Ran Out of Food in the Last Year: Never true   Transportation Needs: No Transportation Needs (2/18/2025)    PRAPARE -  Transportation     Lack of Transportation (Medical): No     Lack of Transportation (Non-Medical): No   Physical Activity: Unknown (2/18/2025)    Exercise Vital Sign     Days of Exercise per Week: 5 days   Stress: Stress Concern Present (2/18/2025)    [employer] - Occupational Stress Questionnaire     Feeling of Stress : Very much   Housing Stability: Low Risk  (2/18/2025)    Housing Stability Vital Sign     Unable to Pay for Housing in the Last Year: No     Homeless in the Last Year: No   [2]   Current Outpatient Medications   Medication Sig Dispense Refill    amLODIPine (NORVASC) 10 MG tablet Take 1 tablet (10 mg total) by mouth once daily. 90 tablet 0    buPROPion (WELLBUTRIN XL) 150 MG TB24 tablet Take 1 tablet (150 mg total) by mouth once daily. 90 tablet 1    DULoxetine (CYMBALTA) 60 MG capsule Take 1 capsule (60 mg total) by mouth once daily. 90 capsule 0    gabapentin (NEURONTIN) 300 MG capsule Take 1 capsule (300 mg total) by mouth every evening. 90 capsule 1    HYDROcodone-acetaminophen (NORCO) 5-325 mg per tablet Take 1 tablet by mouth 3 (three) times daily as needed.      methocarbamoL (ROBAXIN) 750 MG Tab Take 2 tablets (1,500 mg total) by mouth every 4 to 6 hours as needed. 90 tablet 1    omega-3s-dha-epa-fish oil-D3 1,250 mg-1,375 mg-25 mcg Cap Take 1 capsule by mouth Daily. 30 capsule 11    potassium chloride (MICRO-K) 10 MEQ CpSR Take 1 capsule (10 mEq total) by mouth once daily. 30 capsule 11    rosuvastatin (CRESTOR) 40 MG Tab Take 1 tablet (40 mg total) by mouth every evening. 90 tablet 1    doxycycline (VIBRA-TABS) 100 MG tablet Take 1 tablet (100 mg total) by mouth 2 (two) times daily. for 7 days 14 tablet 0     No current facility-administered medications for this visit.

## 2025-06-03 ENCOUNTER — OFFICE VISIT (OUTPATIENT)
Dept: FAMILY MEDICINE | Facility: CLINIC | Age: 45
End: 2025-06-03
Payer: COMMERCIAL

## 2025-06-03 VITALS
TEMPERATURE: 99 F | DIASTOLIC BLOOD PRESSURE: 86 MMHG | HEART RATE: 133 BPM | WEIGHT: 156.5 LBS | SYSTOLIC BLOOD PRESSURE: 122 MMHG | OXYGEN SATURATION: 96 % | BODY MASS INDEX: 32.85 KG/M2 | HEIGHT: 58 IN

## 2025-06-03 DIAGNOSIS — M50.30 DEGENERATIVE DISC DISEASE, CERVICAL: ICD-10-CM

## 2025-06-03 DIAGNOSIS — R09.82 POST-NASAL DRIP: ICD-10-CM

## 2025-06-03 DIAGNOSIS — R23.3 EASY BRUISABILITY: ICD-10-CM

## 2025-06-03 DIAGNOSIS — R00.0 TACHYCARDIA: ICD-10-CM

## 2025-06-03 DIAGNOSIS — Z98.1 S/P CERVICAL SPINAL FUSION: Primary | ICD-10-CM

## 2025-06-03 PROCEDURE — 3008F BODY MASS INDEX DOCD: CPT | Mod: CPTII,S$GLB,, | Performed by: INTERNAL MEDICINE

## 2025-06-03 PROCEDURE — 3074F SYST BP LT 130 MM HG: CPT | Mod: CPTII,S$GLB,, | Performed by: INTERNAL MEDICINE

## 2025-06-03 PROCEDURE — 99214 OFFICE O/P EST MOD 30 MIN: CPT | Mod: S$GLB,,, | Performed by: INTERNAL MEDICINE

## 2025-06-03 PROCEDURE — 1159F MED LIST DOCD IN RCRD: CPT | Mod: CPTII,S$GLB,, | Performed by: INTERNAL MEDICINE

## 2025-06-03 PROCEDURE — 99999 PR PBB SHADOW E&M-EST. PATIENT-LVL IV: CPT | Mod: PBBFAC,,, | Performed by: INTERNAL MEDICINE

## 2025-06-03 PROCEDURE — 1160F RVW MEDS BY RX/DR IN RCRD: CPT | Mod: CPTII,S$GLB,, | Performed by: INTERNAL MEDICINE

## 2025-06-03 PROCEDURE — 93005 ELECTROCARDIOGRAM TRACING: CPT | Mod: S$GLB,,, | Performed by: INTERNAL MEDICINE

## 2025-06-03 PROCEDURE — G2211 COMPLEX E/M VISIT ADD ON: HCPCS | Mod: S$GLB,,, | Performed by: INTERNAL MEDICINE

## 2025-06-03 PROCEDURE — 3044F HG A1C LEVEL LT 7.0%: CPT | Mod: CPTII,S$GLB,, | Performed by: INTERNAL MEDICINE

## 2025-06-03 PROCEDURE — 3079F DIAST BP 80-89 MM HG: CPT | Mod: CPTII,S$GLB,, | Performed by: INTERNAL MEDICINE

## 2025-06-03 PROCEDURE — 93010 ELECTROCARDIOGRAM REPORT: CPT | Mod: S$GLB,,, | Performed by: INTERNAL MEDICINE

## 2025-06-03 RX ORDER — CYCLOBENZAPRINE HCL 10 MG
10 TABLET ORAL
COMMUNITY
Start: 2025-05-23

## 2025-06-04 ENCOUNTER — E-CONSULT (OUTPATIENT)
Dept: HEMATOLOGY/ONCOLOGY | Facility: CLINIC | Age: 45
End: 2025-06-04
Payer: COMMERCIAL

## 2025-06-04 ENCOUNTER — PATIENT MESSAGE (OUTPATIENT)
Dept: FAMILY MEDICINE | Facility: CLINIC | Age: 45
End: 2025-06-04
Payer: COMMERCIAL

## 2025-06-04 DIAGNOSIS — T14.8XXA BRUISING: Primary | ICD-10-CM

## 2025-06-04 DIAGNOSIS — R23.3 EASY BRUISING: Primary | ICD-10-CM

## 2025-06-04 LAB
OHS QRS DURATION: 88 MS
OHS QTC CALCULATION: 433 MS

## 2025-06-06 ENCOUNTER — TELEPHONE (OUTPATIENT)
Dept: FAMILY MEDICINE | Facility: CLINIC | Age: 45
End: 2025-06-06
Payer: COMMERCIAL

## 2025-06-06 DIAGNOSIS — R23.3 EASY BRUISABILITY: Primary | ICD-10-CM

## 2025-06-23 ENCOUNTER — TELEPHONE (OUTPATIENT)
Dept: FAMILY MEDICINE | Facility: CLINIC | Age: 45
End: 2025-06-23
Payer: COMMERCIAL

## 2025-06-23 NOTE — TELEPHONE ENCOUNTER
Copied from CRM #[id number]. Topic: Appointments - Appointment Access  >> Jun 23, 2025  2:45 PM Amrita wrote:  :  Appointment Request    Name of Caller:Crystal FRIEDMAN     When is the first available appointment?No access    Symptoms: Platelet Aggregation    Would the patient rather a call back or a response via [facility]? Call back     Best Call Back Number:[phone] (M)      Additional Information: Pt states she is trying to schedule this lab test as soon as possible.Pt states to please call back with further assistance in scheduling.

## 2025-06-23 NOTE — TELEPHONE ENCOUNTER
Patient informed of appointment scheduled for 6/24/2025@8:00 am by Caryl.  Patient advised to inform lab of 3 additional labs that need to be drawn.  Patient verbalized understanding.

## 2025-06-23 NOTE — TELEPHONE ENCOUNTER
Copied from CRM #[id number]. Topic: Appointments - Amb Referral  >> Jun 23, 2025  4:12 PM June wrote:  .Type: Patient Call Back    Who called: self     What is the request in detail: Pt was told Dr has to call and make her lab appt at [facility] she isnt allowed to do it , please call pt back     Can the clinic reply by [facility]? Call back     Would the patient rather a call back or a response via [facility]?  Call back     Best call back number:.[phone]      Additional Information:

## 2025-06-24 ENCOUNTER — LAB VISIT (OUTPATIENT)
Dept: LAB | Facility: HOSPITAL | Age: 45
End: 2025-06-24
Payer: COMMERCIAL

## 2025-06-24 DIAGNOSIS — R23.3 EASY BRUISABILITY: ICD-10-CM

## 2025-06-24 LAB — FIBRINOGEN PPP-MCNC: 383 MG/DL (ref 182–400)

## 2025-06-24 PROCEDURE — 85384 FIBRINOGEN ACTIVITY: CPT

## 2025-06-24 PROCEDURE — 85576 BLOOD PLATELET AGGREGATION: CPT

## 2025-06-24 PROCEDURE — 85246 CLOT FACTOR VIII VW ANTIGEN: CPT

## 2025-06-24 PROCEDURE — 85290 CLOT FACTOR XIII FIBRIN STAB: CPT

## 2025-06-24 PROCEDURE — 36415 COLL VENOUS BLD VENIPUNCTURE: CPT

## 2025-06-25 LAB
FACT VIII ACT/NOR PPP: 97 % (ref 55–200)
Lab: NORMAL
VON WILLEBRAND EVAL PPP-IMP: NORMAL
VWF AG ACT/NOR PPP IA: 90 % (ref 55–200)
VWF:AC ACT/NOR PPP IA: 74 % (ref 55–200)

## 2025-06-27 ENCOUNTER — RESULTS FOLLOW-UP (OUTPATIENT)
Dept: FAMILY MEDICINE | Facility: CLINIC | Age: 45
End: 2025-06-27

## 2025-07-01 LAB — FACT XIII ACT/NOR PPP CHRO: 162 %

## 2025-07-02 ENCOUNTER — PATIENT MESSAGE (OUTPATIENT)
Dept: FAMILY MEDICINE | Facility: CLINIC | Age: 45
End: 2025-07-02
Payer: COMMERCIAL

## 2025-07-02 DIAGNOSIS — R23.3 EASY BRUISING: Primary | ICD-10-CM

## 2025-07-02 DIAGNOSIS — R00.2 PALPITATIONS: ICD-10-CM

## 2025-07-07 ENCOUNTER — HOSPITAL ENCOUNTER (EMERGENCY)
Facility: HOSPITAL | Age: 45
Discharge: HOME | End: 2025-07-07
Attending: STUDENT IN AN ORGANIZED HEALTH CARE EDUCATION/TRAINING PROGRAM
Payer: COMMERCIAL

## 2025-07-07 VITALS
BODY MASS INDEX: 31.49 KG/M2 | SYSTOLIC BLOOD PRESSURE: 109 MMHG | OXYGEN SATURATION: 96 % | DIASTOLIC BLOOD PRESSURE: 73 MMHG | HEART RATE: 96 BPM | HEIGHT: 58 IN | RESPIRATION RATE: 18 BRPM | WEIGHT: 150 LBS | TEMPERATURE: 98 F

## 2025-07-07 DIAGNOSIS — R10.30 LOWER ABDOMINAL PAIN: ICD-10-CM

## 2025-07-07 DIAGNOSIS — R20.0 LEG NUMBNESS: ICD-10-CM

## 2025-07-07 DIAGNOSIS — M54.9 BACK PAIN, UNSPECIFIED BACK LOCATION, UNSPECIFIED BACK PAIN LATERALITY, UNSPECIFIED CHRONICITY: Primary | ICD-10-CM

## 2025-07-07 DIAGNOSIS — R39.198 DIFFICULTY URINATING: ICD-10-CM

## 2025-07-07 LAB
ABSOLUTE EOSINOPHIL (OHS): 0.01 K/UL
ABSOLUTE MONOCYTE (OHS): 0.65 K/UL (ref 0.3–1)
ABSOLUTE NEUTROPHIL COUNT (OHS): 8.84 K/UL (ref 1.8–7.7)
ALBUMIN SERPL BCP-MCNC: 4.2 G/DL (ref 3.5–5.2)
ALP SERPL-CCNC: 64 UNIT/L (ref 40–150)
ALT SERPL W/O P-5'-P-CCNC: 20 UNIT/L (ref 10–44)
ANION GAP (OHS): 12 MMOL/L (ref 8–16)
APTT PPP: 25.8 SECONDS (ref 21–32)
AST SERPL-CCNC: 16 UNIT/L (ref 11–45)
BACTERIA #/AREA URNS AUTO: NORMAL /HPF
BASOPHILS # BLD AUTO: 0.04 K/UL
BASOPHILS NFR BLD AUTO: 0.3 %
BILIRUB SERPL-MCNC: 0.4 MG/DL (ref 0.1–1)
BILIRUB UR QL STRIP.AUTO: NEGATIVE
BILIRUB UR QL STRIP.AUTO: NEGATIVE
BUN SERPL-MCNC: 14 MG/DL (ref 6–20)
CALCIUM SERPL-MCNC: 9 MG/DL (ref 8.7–10.5)
CHLORIDE SERPL-SCNC: 101 MMOL/L (ref 95–110)
CLARITY UR: CLEAR
CLARITY UR: CLEAR
CO2 SERPL-SCNC: 25 MMOL/L (ref 23–29)
COLOR UR AUTO: COLORLESS
COLOR UR AUTO: YELLOW
CREAT SERPL-MCNC: 0.7 MG/DL (ref 0.5–1.4)
ERYTHROCYTE [DISTWIDTH] IN BLOOD BY AUTOMATED COUNT: 12.6 % (ref 11.5–14.5)
GFR SERPLBLD CREATININE-BSD FMLA CKD-EPI: >60 ML/MIN/1.73/M2
GLUCOSE SERPL-MCNC: 119 MG/DL (ref 70–110)
GLUCOSE UR QL STRIP: NEGATIVE
GLUCOSE UR QL STRIP: NEGATIVE
HCT VFR BLD AUTO: 36.2 % (ref 37–48.5)
HGB BLD-MCNC: 12.5 GM/DL (ref 12–16)
HGB UR QL STRIP: ABNORMAL
HGB UR QL STRIP: ABNORMAL
IMM GRANULOCYTES # BLD AUTO: 0.08 K/UL (ref 0–0.04)
IMM GRANULOCYTES NFR BLD AUTO: 0.6 % (ref 0–0.5)
INR PPP: 1 (ref 0.8–1.2)
KETONES UR QL STRIP: NEGATIVE
KETONES UR QL STRIP: NEGATIVE
LEUKOCYTE ESTERASE UR QL STRIP: NEGATIVE
LEUKOCYTE ESTERASE UR QL STRIP: NEGATIVE
LYMPHOCYTES # BLD AUTO: 2.96 K/UL (ref 1–4.8)
MCH RBC QN AUTO: 30.1 PG (ref 27–31)
MCHC RBC AUTO-ENTMCNC: 34.5 G/DL (ref 32–36)
MCV RBC AUTO: 87 FL (ref 82–98)
MICROSCOPIC COMMENT: NORMAL
NITRITE UR QL STRIP: NEGATIVE
NITRITE UR QL STRIP: NEGATIVE
NUCLEATED RBC (/100WBC) (OHS): 0 /100 WBC
PH UR STRIP: 7 [PH]
PH UR STRIP: 7 [PH]
PLATELET # BLD AUTO: 309 K/UL (ref 150–450)
PMV BLD AUTO: 9.4 FL (ref 9.2–12.9)
POTASSIUM SERPL-SCNC: 3.1 MMOL/L (ref 3.5–5.1)
PROT SERPL-MCNC: 7.8 GM/DL (ref 6–8.4)
PROT UR QL STRIP: NEGATIVE
PROT UR QL STRIP: NEGATIVE
PROTHROMBIN TIME: 10.8 SECONDS (ref 9–12.5)
RBC # BLD AUTO: 4.15 M/UL (ref 4–5.4)
RBC #/AREA URNS AUTO: 2 /HPF (ref 0–4)
RELATIVE EOSINOPHIL (OHS): 0.1 %
RELATIVE LYMPHOCYTE (OHS): 23.5 % (ref 18–48)
RELATIVE MONOCYTE (OHS): 5.2 % (ref 4–15)
RELATIVE NEUTROPHIL (OHS): 70.3 % (ref 38–73)
SODIUM SERPL-SCNC: 138 MMOL/L (ref 136–145)
SP GR UR STRIP: 1.01
SP GR UR STRIP: 1.01
UROBILINOGEN UR STRIP-ACNC: NEGATIVE EU/DL
UROBILINOGEN UR STRIP-ACNC: NEGATIVE EU/DL
WBC # BLD AUTO: 12.58 K/UL (ref 3.9–12.7)

## 2025-07-07 PROCEDURE — 85025 COMPLETE CBC W/AUTO DIFF WBC: CPT | Performed by: STUDENT IN AN ORGANIZED HEALTH CARE EDUCATION/TRAINING PROGRAM

## 2025-07-07 PROCEDURE — 96375 TX/PRO/DX INJ NEW DRUG ADDON: CPT

## 2025-07-07 PROCEDURE — 63600175 PHARM REV CODE 636 W HCPCS: Mod: JZ,TB | Performed by: STUDENT IN AN ORGANIZED HEALTH CARE EDUCATION/TRAINING PROGRAM

## 2025-07-07 PROCEDURE — 25000003 PHARM REV CODE 250: Performed by: STUDENT IN AN ORGANIZED HEALTH CARE EDUCATION/TRAINING PROGRAM

## 2025-07-07 PROCEDURE — 63600175 PHARM REV CODE 636 W HCPCS: Performed by: EMERGENCY MEDICINE

## 2025-07-07 PROCEDURE — 25000003 PHARM REV CODE 250: Performed by: EMERGENCY MEDICINE

## 2025-07-07 PROCEDURE — 96374 THER/PROPH/DIAG INJ IV PUSH: CPT

## 2025-07-07 PROCEDURE — 82374 ASSAY BLOOD CARBON DIOXIDE: CPT | Performed by: STUDENT IN AN ORGANIZED HEALTH CARE EDUCATION/TRAINING PROGRAM

## 2025-07-07 PROCEDURE — 85730 THROMBOPLASTIN TIME PARTIAL: CPT | Performed by: STUDENT IN AN ORGANIZED HEALTH CARE EDUCATION/TRAINING PROGRAM

## 2025-07-07 PROCEDURE — 96361 HYDRATE IV INFUSION ADD-ON: CPT

## 2025-07-07 PROCEDURE — 99285 EMERGENCY DEPT VISIT HI MDM: CPT | Mod: 25

## 2025-07-07 PROCEDURE — 85610 PROTHROMBIN TIME: CPT | Performed by: STUDENT IN AN ORGANIZED HEALTH CARE EDUCATION/TRAINING PROGRAM

## 2025-07-07 PROCEDURE — 81001 URINALYSIS AUTO W/SCOPE: CPT | Performed by: PHYSICIAN ASSISTANT

## 2025-07-07 RX ORDER — MORPHINE SULFATE 4 MG/ML
5 INJECTION, SOLUTION INTRAMUSCULAR; INTRAVENOUS
Refills: 0 | Status: COMPLETED | OUTPATIENT
Start: 2025-07-07 | End: 2025-07-07

## 2025-07-07 RX ORDER — HYDROCODONE BITARTRATE AND ACETAMINOPHEN 5; 325 MG/1; MG/1
1 TABLET ORAL EVERY 6 HOURS PRN
Qty: 20 TABLET | Refills: 0 | Status: SHIPPED | OUTPATIENT
Start: 2025-07-07 | End: 2025-07-16

## 2025-07-07 RX ORDER — POTASSIUM CHLORIDE 20 MEQ/1
40 TABLET, EXTENDED RELEASE ORAL ONCE
Status: COMPLETED | OUTPATIENT
Start: 2025-07-07 | End: 2025-07-07

## 2025-07-07 RX ORDER — KETOROLAC TROMETHAMINE 30 MG/ML
15 INJECTION, SOLUTION INTRAMUSCULAR; INTRAVENOUS
Status: COMPLETED | OUTPATIENT
Start: 2025-07-07 | End: 2025-07-07

## 2025-07-07 RX ORDER — TIZANIDINE 4 MG/1
4 TABLET ORAL EVERY 8 HOURS PRN
Qty: 25 TABLET | Refills: 0 | Status: SHIPPED | OUTPATIENT
Start: 2025-07-07 | End: 2025-07-16 | Stop reason: CLARIF

## 2025-07-07 RX ORDER — GABAPENTIN 300 MG/1
300 CAPSULE ORAL ONCE
Status: COMPLETED | OUTPATIENT
Start: 2025-07-07 | End: 2025-07-07

## 2025-07-07 RX ORDER — ACETAMINOPHEN 500 MG
1000 TABLET ORAL
Status: COMPLETED | OUTPATIENT
Start: 2025-07-07 | End: 2025-07-07

## 2025-07-07 RX ORDER — IBUPROFEN 600 MG/1
600 TABLET, FILM COATED ORAL EVERY 6 HOURS PRN
Qty: 20 TABLET | Refills: 0 | Status: SHIPPED | OUTPATIENT
Start: 2025-07-07 | End: 2025-07-16 | Stop reason: CLARIF

## 2025-07-07 RX ADMIN — SODIUM CHLORIDE 1000 ML: 9 INJECTION, SOLUTION INTRAVENOUS at 08:07

## 2025-07-07 RX ADMIN — ACETAMINOPHEN 1000 MG: 500 TABLET ORAL at 05:07

## 2025-07-07 RX ADMIN — KETOROLAC TROMETHAMINE 15 MG: 30 INJECTION, SOLUTION INTRAMUSCULAR; INTRAVENOUS at 05:07

## 2025-07-07 RX ADMIN — GABAPENTIN 300 MG: 300 CAPSULE ORAL at 05:07

## 2025-07-07 RX ADMIN — MORPHINE SULFATE 5 MG: 4 INJECTION, SOLUTION INTRAMUSCULAR; INTRAVENOUS at 08:07

## 2025-07-07 RX ADMIN — POTASSIUM CHLORIDE 40 MEQ: 1500 TABLET, FILM COATED, EXTENDED RELEASE ORAL at 07:07

## 2025-07-07 NOTE — ED PROVIDER NOTES
"Encounter Date: 2025       History     Chief Complaint   Patient presents with    Back Pain     Patient presents to ED secondary to lower back pain x1 week. Was seen in ED one week ago at  and prescribed norco, robaxin and steroids. No relief with medications. States lower back pain now wraps to abdomen and is accompanied by "pressure" to the bladder and the inability to empty out bladder completely. States symptoms worsened x2 days ago. Also accompanied by nausea. No meds taken tonight for pain. States last took prescribed norco and robaxin at 1330 today with no relief.      HPI    45-year-old female with a history of recent ACDF C4-C5 C5-C6 as well as reported bulging discs in her lumbar spine presents to ED for evaluation of 1 week of severe left lower and midline back pain radiates around to her left groin.  She also notes numbness in the left left lower extremity extending down to the knee as well as weakness in that left lower extremity that she mainly attributes to pain.  She also notes that she feels like she is not voiding her bladder completely.  She denies any dysuria, fevers, hematuria, abdominal pain, nausea, vomiting.  She denies any IV drug use.    Review of patient's allergies indicates:   Allergen Reactions    Codeine Hives     Past Medical History:   Diagnosis Date    Headache      Past Surgical History:   Procedure Laterality Date    acdf c4-c5 c5-c6  2025     SECTION      COSMETIC SURGERY  2013    Breast reduction    HYSTERECTOMY      TOTAL REDUCTION MAMMOPLASTY      TUBAL LIGATION       Family History   Problem Relation Name Age of Onset    Hypertension Mother      No Known Problems Father      No Known Problems Sister      No Known Problems Brother      Diabetes Maternal Grandmother grandmother sanju     Stroke Maternal Grandmother grandmother sanju     Aneurysm Maternal Grandfather      Dementia Paternal Grandmother       Social History[1]  Review of Systems   Constitutional: "  Negative for fever.   HENT:  Negative for sore throat.    Respiratory:  Negative for shortness of breath.    Cardiovascular:  Negative for chest pain.   Gastrointestinal:  Negative for abdominal pain, nausea and vomiting.   Genitourinary:  Positive for difficulty urinating. Negative for dysuria, frequency and hematuria.   Musculoskeletal:  Positive for back pain.   Skin:  Negative for rash.   Neurological:  Positive for numbness (left lower extremity). Negative for weakness.   Hematological:  Does not bruise/bleed easily.       Physical Exam     Initial Vitals [07/07/25 0259]   BP Pulse Resp Temp SpO2   (!) 155/106 105 18 98.2 °F (36.8 °C) 97 %      MAP       --         Physical Exam    Constitutional: Vital signs are normal. She appears well-developed and well-nourished.  Non-toxic appearance. She does not have a sickly appearance. She does not appear ill.   HENT:   Head: Normocephalic and atraumatic. Mouth/Throat: Mucous membranes are normal.   Eyes: EOM are normal.   Neck: Neck supple.   Cardiovascular:  Normal rate and regular rhythm.           Pulmonary/Chest: No respiratory distress.   Abdominal: Abdomen is soft. Bowel sounds are normal. There is no abdominal tenderness. There is no rebound and no guarding.   Musculoskeletal:      Cervical back: Neck supple.     Neurological: She is alert. A sensory deficit (left upper extremity) is present.   Patient 4/5, plantar flexion, dorsiflexion, hip flexion on the left compared to 5/5 on the right   Skin: Skin is warm and dry. No rash noted.   Psychiatric: She has a normal mood and affect.         ED Course   Procedures  Labs Reviewed   URINALYSIS, REFLEX TO URINE CULTURE - Abnormal       Result Value    Color, UA Yellow      Appearance, UA Clear      pH, UA 7.0      Spec Grav UA 1.015      Protein, UA Negative      Glucose, UA Negative      Ketones, UA Negative      Bilirubin, UA Negative      Blood, UA 1+ (*)     Nitrites, UA Negative      Urobilinogen, UA Negative       Leukocyte Esterase, UA Negative     COMPREHENSIVE METABOLIC PANEL - Abnormal    Sodium 138      Potassium 3.1 (*)     Chloride 101      CO2 25      Glucose 119 (*)     BUN 14      Creatinine 0.7      Calcium 9.0      Protein Total 7.8      Albumin 4.2      Bilirubin Total 0.4      ALP 64      AST 16      ALT 20      Anion Gap 12      eGFR >60     URINALYSIS, REFLEX TO URINE CULTURE - Abnormal    Color, UA Colorless (*)     Appearance, UA Clear      pH, UA 7.0      Spec Grav UA 1.010      Protein, UA Negative      Glucose, UA Negative      Ketones, UA Negative      Bilirubin, UA Negative      Blood, UA Trace (*)     Nitrites, UA Negative      Urobilinogen, UA Negative      Leukocyte Esterase, UA Negative     CBC WITH DIFFERENTIAL - Abnormal    WBC 12.58      RBC 4.15      HGB 12.5      HCT 36.2 (*)     MCV 87      MCH 30.1      MCHC 34.5      RDW 12.6      Platelet Count 309      MPV 9.4      Nucleated RBC 0      Neut % 70.3      Lymph % 23.5      Mono % 5.2      Eos % 0.1      Basophil % 0.3      Imm Grans % 0.6 (*)     Neut # 8.84 (*)     Lymph # 2.96      Mono # 0.65      Eos # 0.01      Baso # 0.04      Imm Grans # 0.08 (*)    APTT - Normal    PTT 25.8     PROTIME-INR - Normal    PT 10.8      INR 1.0     URINALYSIS MICROSCOPIC    RBC, UA 2      Bacteria, UA Rare      Microscopic Comment       CBC W/ AUTO DIFFERENTIAL    Narrative:     The following orders were created for panel order CBC auto differential.  Procedure                               Abnormality         Status                     ---------                               -----------         ------                     CBC with Differential[[id number]]       Abnormal            Final result                 Please view results for these tests on the individual orders.   GREY TOP URINE HOLD   GREY TOP URINE HOLD   POCT URINE PREGNANCY          Imaging Results              MRI Lumbar Spine Without Contrast (Final result)  Result time 07/07/25 06:30:42       Final result by Erasmo Ansari MD (07/07/25 06:30:42)                   Impression:      Mild degenerative changes in the lumbar spine.  No acute fracture or significant central canal stenosis.      Electronically signed by: Erasmo Ansari MD  Date:    07/07/2025  Time:    06:30               Narrative:    EXAMINATION:  MRI LUMBAR SPINE WITHOUT CONTRAST    CLINICAL HISTORY:  Low back pain, cauda equina syndrome suspected;    TECHNIQUE:  Multiplanar, multisequence MR images were acquired from the thoracolumbar junction to the sacrum without the administration of contrast.    COMPARISON:  Lumbar spine MRI report from 04/03/2025 (images are not available for review). Lumbar spine radiograph, 11/16/2023.    FINDINGS:  Alignment: Normal.    Vertebrae: Degenerative endplate changes with hyperintense T1 and T2 signal at T11-T12.  This likely represents type 2 Modic changes.  No significant marrow edema.  No acute fracture.  Vertebral body heights are well maintained.    Discs: Mild multilevel disc space height loss and disc desiccation, most notable at L4-L5.    Cord: Normal.  Conus terminates at L2.    Degenerative findings:    T12-L1: No central canal stenosis or neural foraminal narrowing.    L1-L2: Minimal posterior disc bulge.  No central canal stenosis or neural foraminal narrowing.    L2-L3: Minimal posterior disc bulge.  Mild facet arthropathy.  No central canal stenosis or neural foraminal narrowing.    L3-L4: Small posterior disc bulge and disc desiccation.  Mild facet arthropathy.  Disc extends approximately 3 mm into the central canal.  No significant central canal stenosis or neural foraminal narrowing.    L4-L5: Small posterior disc bulge and disc desiccation.  Disc extends approximately 3 mm into the central canal.  Mild facet arthropathy.  Minimal left greater than right neural foraminal narrowing.  Mild narrowing of the left lateral recess.  No significant central canal stenosis.    L5-S1: Small  posterior disc bulge.  Mild facet arthropathy.  No significant central canal stenosis or neural foraminal narrowing.    Paraspinal muscles & soft tissues: Suggestion of minimal edema in the left paraspinal/psoas muscle at L2-L3.  Otherwise, no significant abnormality.                                       Medications   potassium chloride SA CR tablet 40 mEq (has no administration in time range)   ketorolac injection 15 mg (15 mg Intravenous Given 7/7/25 0513)   acetaminophen tablet 1,000 mg (1,000 mg Oral Given 7/7/25 0513)   gabapentin capsule 300 mg (300 mg Oral Given 7/7/25 0513)     Medical Decision Making  Amount and/or Complexity of Data Reviewed  Labs: ordered.  Radiology: ordered.    Risk  OTC drugs.  Prescription drug management.    Vitals unremarkable   Patient is well-appearing and in no acute distress   Numbness in the proximal left lower extremity that cuts off just proximal to the knee and also reporting that she feels she is not voiding her bladder completely   Presentation concerning for possible cord and nerve root impingement  Differential also includes lumbago, sciatica, cystitis, AAA  PVR 31  MRI of the lumbar spine pending  UA without evidence of infection  WBC with a normal limits   Labs otherwise reviewed remarkable for potassium of 3.1; given 40 of p.o. potassium  S/p anterior cervical diskectomy and arthrodesis at C4-C6 with Dr. Acosta on 5/16  4/3/2025 MRI of the lumbar spine showed mild multilevel degenerative spurring as well as lost of disc height at L3-L4, L4-L5, some minimal bilateral neural foraminal stenosis L3-S1  Signed out to Dr. Blade Ralph MD follow up MRI and dispo                                      Clinical Impression:  Final diagnoses:  [M54.9] Back pain, unspecified back location, unspecified back pain laterality, unspecified chronicity (Primary)  [R20.0] Leg numbness  [R39.198] Difficulty urinating                       [1]   Social History  Tobacco Use    Smoking  status: Never    Smokeless tobacco: Never   Substance Use Topics    Alcohol use: Not Currently     Alcohol/week: 1.0 standard drink of alcohol     Types: 1 Glasses of wine per week     Comment: occasionally    Drug use: Never        Olvin Wan MD  07/07/25 0646

## 2025-07-07 NOTE — ED NOTES
Report received. Pt resting comfortably on stretcher with no distress noted. Awake to voice. Reports pain dec to 7/10. Denies needs at this time.

## 2025-07-08 ENCOUNTER — TELEPHONE (OUTPATIENT)
Dept: HEMATOLOGY/ONCOLOGY | Facility: CLINIC | Age: 45
End: 2025-07-08
Payer: COMMERCIAL

## 2025-07-08 LAB
HOLD SPECIMEN: NORMAL
HOLD SPECIMEN: NORMAL

## 2025-07-10 ENCOUNTER — OFFICE VISIT (OUTPATIENT)
Dept: UROLOGY | Facility: CLINIC | Age: 45
End: 2025-07-10
Payer: COMMERCIAL

## 2025-07-10 VITALS — WEIGHT: 173.5 LBS | BODY MASS INDEX: 36.26 KG/M2

## 2025-07-10 DIAGNOSIS — M54.50 LEFT LOW BACK PAIN, UNSPECIFIED CHRONICITY, UNSPECIFIED WHETHER SCIATICA PRESENT: ICD-10-CM

## 2025-07-10 DIAGNOSIS — N20.0 NEPHROLITHIASIS: Primary | ICD-10-CM

## 2025-07-10 PROCEDURE — 3044F HG A1C LEVEL LT 7.0%: CPT | Mod: CPTII,S$GLB,, | Performed by: UROLOGY

## 2025-07-10 PROCEDURE — 1160F RVW MEDS BY RX/DR IN RCRD: CPT | Mod: CPTII,S$GLB,, | Performed by: UROLOGY

## 2025-07-10 PROCEDURE — 99203 OFFICE O/P NEW LOW 30 MIN: CPT | Mod: S$GLB,,, | Performed by: UROLOGY

## 2025-07-10 PROCEDURE — 3008F BODY MASS INDEX DOCD: CPT | Mod: CPTII,S$GLB,, | Performed by: UROLOGY

## 2025-07-10 PROCEDURE — 1159F MED LIST DOCD IN RCRD: CPT | Mod: CPTII,S$GLB,, | Performed by: UROLOGY

## 2025-07-10 PROCEDURE — 99999 PR PBB SHADOW E&M-EST. PATIENT-LVL IV: CPT | Mod: PBBFAC,,, | Performed by: UROLOGY

## 2025-07-10 RX ORDER — METHYLPREDNISOLONE 4 MG/1
4 TABLET ORAL DAILY
COMMUNITY
Start: 2025-07-03 | End: 2025-07-10

## 2025-07-10 NOTE — PROGRESS NOTES
Subjective:       Crystal Greer is a 45 y.o. female who is a new patient who was seen  for evaluation of nephrolithiasis.      She was seen in ER with back pain. She reports recent neck surgery. She then started with LLQ/groin pain with shooting pain down leg. She continues to have severe pain with leg numbness/skin tenderness. S/p bladder repair after hysterectomy years ago.      CT 7/7/25 - 4mm LUP stone, 1-2mm stones in R kidney (x 2). No hydronephrosis or ureteral stones.       The following portions of the patient's history were reviewed and updated as appropriate: allergies, current medications, past family history, past medical history, past social history, past surgical history and problem list.    Review of Systems  Review of systems completed. Pertinent positive and negatives listed in HPI        Objective:    Vitals: Wt 78.7 kg (173 lb 8 oz)   LMP 06/25/2018   BMI 36.26 kg/m²     Physical Exam   General: well developed, well nourished in no acute distress  Head: normocephalic, atraumatic  Neck: no obvious enlargement of thyroid  HEENT: EOMI, mucus membranes moist, sclera anicteric, no hearing impairment  Lungs: symmetric expansion, non-labored breathing  Neuro: alert and oriented x 3, no gross deficits  Psych: normal judgment and insight, normal mood/affect and non-anxious  Genitourinary:   deferred      Lab Review   Urine analysis today in clinic shows negative for all components     Lab Results   Component Value Date    WBC 12.58 07/07/2025    HGB 12.5 07/07/2025    HGB 14.1 02/24/2025    HCT 36.2 (L) 07/07/2025    HCT 42.5 02/24/2025    MCV 87 07/07/2025    MCV 92 02/24/2025     07/07/2025     02/24/2025     Lab Results   Component Value Date    CREATININE 0.7 07/07/2025    CREATININE 0.70 10/24/2018    BUN 14 07/07/2025         Imaging  Images and reports were personally reviewed by me and discussed with patient  CT reviewed       Assessment/Plan:      1. Nephrolithiasis     - 4mm LUP stone, 1-2mm stones in R kidney (x2)   - Nonobstructing   - 4mm stone appears to be radio-opaque on CT    - KUB 3-4mths   - Discussed possible ESWL in future.    - Return precautions discussed     2. Left low back pain, unspecified chronicity, unspecified whether sciatica present    - Would not expect nonobstructing stone to be cause of pain   - Renal colic should not radiate down legs with neurologic changes   - Agree with f/u with PCP/neuro         Follow up in 3-4 mths with KUB

## 2025-07-11 ENCOUNTER — OFFICE VISIT (OUTPATIENT)
Dept: FAMILY MEDICINE | Facility: CLINIC | Age: 45
End: 2025-07-11
Payer: COMMERCIAL

## 2025-07-11 ENCOUNTER — TELEPHONE (OUTPATIENT)
Dept: FAMILY MEDICINE | Facility: CLINIC | Age: 45
End: 2025-07-11

## 2025-07-11 VITALS
DIASTOLIC BLOOD PRESSURE: 72 MMHG | OXYGEN SATURATION: 97 % | SYSTOLIC BLOOD PRESSURE: 106 MMHG | WEIGHT: 163.56 LBS | TEMPERATURE: 98 F | RESPIRATION RATE: 16 BRPM | HEART RATE: 74 BPM | HEIGHT: 58 IN | BODY MASS INDEX: 34.33 KG/M2

## 2025-07-11 DIAGNOSIS — M54.50 CHRONIC RIGHT-SIDED LOW BACK PAIN WITHOUT SCIATICA: ICD-10-CM

## 2025-07-11 DIAGNOSIS — G89.29 CHRONIC RIGHT-SIDED LOW BACK PAIN WITHOUT SCIATICA: ICD-10-CM

## 2025-07-11 DIAGNOSIS — G95.20 CERVICAL SPINAL CORD COMPRESSION: ICD-10-CM

## 2025-07-11 PROCEDURE — 99999 PR PBB SHADOW E&M-EST. PATIENT-LVL V: CPT | Mod: PBBFAC,,, | Performed by: NURSE PRACTITIONER

## 2025-07-11 RX ORDER — AMLODIPINE BESYLATE 5 MG/1
TABLET ORAL
COMMUNITY
Start: 2025-06-12

## 2025-07-11 RX ORDER — METHOCARBAMOL 500 MG/1
1000 TABLET, FILM COATED ORAL 4 TIMES DAILY PRN
COMMUNITY
Start: 2025-07-03 | End: 2025-07-13

## 2025-07-11 RX ORDER — DULOXETIN HYDROCHLORIDE 30 MG/1
CAPSULE, DELAYED RELEASE ORAL
COMMUNITY
Start: 2025-06-12

## 2025-07-11 RX ORDER — GABAPENTIN 300 MG/1
300 CAPSULE ORAL 3 TIMES DAILY
Qty: 90 CAPSULE | Refills: 1 | Status: SHIPPED | OUTPATIENT
Start: 2025-07-11

## 2025-07-11 NOTE — TELEPHONE ENCOUNTER
Patient would like to have MRI results sent to provider at [facility] [place]. Is this okay?  Please advise.

## 2025-07-11 NOTE — PROGRESS NOTES
"Subjective:      Patient ID: Crystal Greer is a 45 y.o. female.  Pt returns to clinic for ER f/u, see course below. She is still recovering from cervical spinal fusion now with acutely worsening low back pain with persistent leg numbness and weakness. Of note, also found to have uncomplicated kidney stone for which she has followed up with urology without new concern      Encounter Date: 7/7/2025  Chief Complaint:Back Pain  Patient presents to ED secondary to lower back pain x1 week. Was seen in ED one week ago at  and prescribed norco, robaxin and steroids. No relief with medications. States lower back pain now wraps to abdomen and is accompanied by "pressure" to the bladder and the inability to empty out bladder completely. States symptoms worsened x2 days ago. Also accompanied by nausea. No meds taken tonight for pain. States last took prescribed norco and robaxin at 1330 today with no relief.   45-year-old female with a history of recent ACDF C4-C5 C5-C6 as well as reported bulging discs in her lumbar spine presents to ED for evaluation of 1 week of severe left lower and midline back pain radiates around to her left groin.  She also notes numbness in the left left lower extremity extending down to the knee as well as weakness in that left lower extremity that she mainly attributes to pain.  She also notes that she feels like she is not voiding her bladder completely.  She denies any dysuria, fevers, hematuria, abdominal pain, nausea, vomiting.  She denies any IV drug use.     ED Course  Procedures  Labs Reviewed  URINALYSIS, REFLEX TO URINE CULTURE - Abnormal      Result Value     Color, UA Yellow       Appearance, UA Clear       pH, UA 7.0       Spec Grav UA 1.015       Protein, UA Negative       Glucose, UA Negative       Ketones, UA Negative       Bilirubin, UA Negative       Blood, UA 1+ (*)      Nitrites, UA Negative       Urobilinogen, UA Negative       Leukocyte Esterase, UA Negative   "   COMPREHENSIVE METABOLIC PANEL - Abnormal    Sodium 138       Potassium 3.1 (*)      Chloride 101       CO2 25       Glucose 119 (*)      BUN 14       Creatinine 0.7       Calcium 9.0       Protein Total 7.8       Albumin 4.2       Bilirubin Total 0.4       ALP 64       AST 16       ALT 20       Anion Gap 12       eGFR >60     URINALYSIS, REFLEX TO URINE CULTURE - Abnormal    Color, UA Colorless (*)      Appearance, UA Clear       pH, UA 7.0       Spec Grav UA 1.010       Protein, UA Negative       Glucose, UA Negative       Ketones, UA Negative       Bilirubin, UA Negative       Blood, UA Trace (*)      Nitrites, UA Negative       Urobilinogen, UA Negative       Leukocyte Esterase, UA Negative     CBC WITH DIFFERENTIAL - Abnormal    WBC 12.58       RBC 4.15       HGB 12.5       HCT 36.2 (*)      MCV 87       MCH 30.1       MCHC 34.5       RDW 12.6       Platelet Count 309       MPV 9.4       Nucleated RBC 0       Neut % 70.3       Lymph % 23.5       Mono % 5.2       Eos % 0.1       Basophil % 0.3       Imm Grans % 0.6 (*)      Neut # 8.84 (*)      Lymph # 2.96       Mono # 0.65       Eos # 0.01       Baso # 0.04       Imm Grans # 0.08 (*)    APTT - Normal    PTT 25.8     PROTIME-INR - Normal    PT 10.8       INR 1.0     URINALYSIS MICROSCOPIC    RBC, UA 2       Bacteria, UA Rare       Microscopic Comment       CBC W/ AUTO DIFFERENTIAL    Narrative:     The following orders were created for panel order CBC auto differential.  Procedure                               Abnormality         Status                     ---------                               -----------         ------                     CBC with Differential[[id number]]       Abnormal            Final result               Please view results for these tests on the individual orders.  GREY TOP URINE HOLD  GREY TOP URINE HOLD  POCT URINE PREGNANCY    Imaging Results   EXAMINATION: MRI LUMBAR SPINE WITHOUT CONTRAST  FINDINGS:  Alignment: Normal.  Vertebrae:  Degenerative endplate changes with hyperintense T1 and T2 signal at T11-T12.  This likely represents type 2 Modic changes.  No significant marrow edema.  No acute fracture.  Vertebral body heights are well maintained.  Discs: Mild multilevel disc space height loss and disc desiccation, most notable at L4-L5.  Cord: Normal.  Conus terminates at L2.  Degenerative findings:  T12-L1: No central canal stenosis or neural foraminal narrowing.  L1-L2: Minimal posterior disc bulge.  No central canal stenosis or neural foraminal narrowing.  L2-L3: Minimal posterior disc bulge.  Mild facet arthropathy.  No central canal stenosis or neural foraminal narrowing.  L3-L4: Small posterior disc bulge and disc desiccation.  Mild facet arthropathy.  Disc extends approximately 3 mm into the central canal.  No significant central canal stenosis or neural foraminal narrowing.  L4-L5: Small posterior disc bulge and disc desiccation.  Disc extends approximately 3 mm into the central canal.  Mild facet arthropathy.  Minimal left greater than right neural foraminal narrowing.  Mild narrowing of the left lateral recess.  No significant central canal stenosis.  L5-S1: Small posterior disc bulge.  Mild facet arthropathy.  No significant central canal stenosis or neural foraminal narrowing.  Paraspinal muscles & soft tissues: Suggestion of minimal edema in the left paraspinal/psoas muscle at L2-L3.  Otherwise, no significant abnormality.       Medications  potassium chloride SA CR tablet 40 mEq (has no administration in time range)  ketorolac injection 15 mg (15 mg Intravenous Given 7/7/25 0513)  acetaminophen tablet 1,000 mg (1,000 mg Oral Given 7/7/25 0513)  gabapentin capsule 300 mg (300 mg Oral Given 7/7/25 0513)     Vitals unremarkable   Patient is well-appearing and in no acute distress   Numbness in the proximal left lower extremity that cuts off just proximal to the knee and also reporting that she feels she is not voiding her bladder  completely   Presentation concerning for possible cord and nerve root impingement  Differential also includes lumbago, sciatica, cystitis, AAA  PVR 31  MRI of the lumbar spine pending  UA without evidence of infection  WBC with a normal limits   Labs otherwise reviewed remarkable for potassium of 3.1; given 40 of p.o. potassium  S/p anterior cervical diskectomy and arthrodesis at C4-C6 with Dr. Acosta on 5/16  4/3/2025 MRI of the lumbar spine showed mild multilevel degenerative spurring as well as lost of disc height at L3-L4, L4-L5, some minimal bilateral neural foraminal stenosis L3-S1  Signed out to Dr. Blade Ralph MD follow up MRI and dispo    Clinical Impression:  Final diagnoses:  [M54.9] Back pain, unspecified back location, unspecified back pain laterality, unspecified chronicity (Primary)  [R20.0] Leg numbness  [R39.198] Difficulty urinating    Olvin Wan MD  07/07/25 0646    Back Pain  Chronicity: acute on chronic. The current episode started 1 to 4 weeks ago. The problem occurs constantly. The problem is unchanged. The pain is present in the sacro-iliac and lumbar spine. The quality of the pain is described as aching, cramping and burning. The pain radiates to the right thigh and right knee. The pain is at a severity of 10/10. The pain is severe. The pain is The same all the time. The symptoms are aggravated by bending, position, lying down, standing, sitting and twisting. Stiffness is present All day. Associated symptoms include leg pain, numbness, paresis, paresthesias, tingling and weakness. Pertinent negatives include no abdominal pain, bladder incontinence, bowel incontinence, chest pain, dysuria, fever, headaches, pelvic pain, perianal numbness or weight loss. Risk factors: twisted to  grand daughter. She has tried NSAIDs and analgesics for the symptoms. The treatment provided no relief.     Review of Systems   Constitutional:  Negative for activity change, appetite change,  "fatigue, fever, night sweats, unexpected weight change and weight loss.   Respiratory:  Negative for chest tightness and shortness of breath.    Cardiovascular:  Negative for chest pain and palpitations.   Gastrointestinal:  Negative for abdominal pain, bowel incontinence, change in bowel habit, constipation, diarrhea, nausea, vomiting and fecal incontinence.   Genitourinary:  Negative for bladder incontinence, difficulty urinating, dysuria, menstrual problem and pelvic pain.   Musculoskeletal:  Positive for arthralgias, back pain, gait problem, joint swelling, leg pain, myalgias, neck pain, neck stiffness and joint deformity.   Integumentary:  Negative for rash.   Neurological:  Positive for tingling, weakness, numbness and paresthesias. Negative for dizziness, vertigo, tremors, seizures, syncope, facial asymmetry, speech difficulty, light-headedness, headaches, coordination difficulties and memory loss.   Psychiatric/Behavioral: Negative.     All other systems reviewed and are negative.        Objective:     Vitals:    07/11/25 1027   BP: 106/72   BP Location: Left arm   Patient Position: Sitting   Pulse: 74   Resp: 16   Temp: 97.9 °F (36.6 °C)   TempSrc: Oral   SpO2: 97%   Weight: 74.2 kg (163 lb 9.3 oz)   Height: 4' 10" (1.473 m)     Physical Exam  Vitals and nursing note reviewed.   Constitutional:       General: She is not in acute distress.     Appearance: Normal appearance. She is well-developed and well-groomed. She is not ill-appearing.   HENT:      Head: Normocephalic and atraumatic.      Right Ear: External ear normal.      Left Ear: External ear normal.      Nose: Nose normal.      Mouth/Throat:      Lips: Pink.      Mouth: Mucous membranes are moist.   Eyes:      General: Lids are normal. Vision grossly intact. Gaze aligned appropriately.      Conjunctiva/sclera: Conjunctivae normal.   Neck:      Trachea: Phonation normal.   Pulmonary:      Effort: Pulmonary effort is normal. No accessory muscle usage " or respiratory distress.   Abdominal:      General: Abdomen is flat. There is no distension.   Musculoskeletal:      Cervical back: Neck supple.   Skin:     General: Skin is warm and dry.      Findings: No rash.   Neurological:      General: No focal deficit present.      Mental Status: She is alert and oriented to person, place, and time.      Motor: Weakness and abnormal muscle tone present. No tremor or atrophy.      Gait: Gait abnormal.   Psychiatric:         Attention and Perception: Attention and perception normal.         Mood and Affect: Mood and affect normal.         Speech: Speech normal.         Behavior: Behavior normal. Behavior is cooperative.         Thought Content: Thought content normal.         Cognition and Memory: Cognition and memory normal.         Judgment: Judgment normal.       1. Chronic right-sided low back pain without sciatica  Neurovascularly intact with acute deficit though has baseline paresis and paresthesia   Natural history and expected course discussed. Questions answered.  Ice to affected area as needed for local pain relief.  Heat to affected area as needed for local pain relief.  Keep scheduled appointment with  7/15  - gabapentin (NEURONTIN) 300 MG capsule; Take 1 capsule (300 mg total) by mouth 3 (three) times daily.  Dispense: 90 capsule; Refill: 1    2. Cervical spinal cord compression  Improving, recovering s/p fusion without acute sensor or motor deficit  - gabapentin (NEURONTIN) 300 MG capsule; Take 1 capsule (300 mg total) by mouth 3 (three) times daily.  Dispense: 90 capsule; Refill: 1           RAYMON Marte, FNP-C  Family/Internal Medicine  [facility]

## 2025-07-11 NOTE — TELEPHONE ENCOUNTER
Copied from CRM #[id number]. Topic: General Inquiry - Information Request  >> Jul 11, 2025 11:34 AM Med Assistant Ladi wrote:  Type: Patient Call Back    Who called: Self    What is the request in detail: pt. Is asking if her MRI results can be faxed to one of her providers at [facility] ..     Can the clinic reply by KEKESMARY ELLEN?NO    Would the patient rather a call back or a response via [facility]? Yes, call     Best call back number: [phone] (home)

## 2025-07-15 ENCOUNTER — E-VISIT (OUTPATIENT)
Dept: FAMILY MEDICINE | Facility: CLINIC | Age: 45
End: 2025-07-15
Payer: COMMERCIAL

## 2025-07-15 DIAGNOSIS — M46.1 SACROILIITIS: ICD-10-CM

## 2025-07-15 DIAGNOSIS — M54.50 ACUTE RIGHT-SIDED LOW BACK PAIN WITHOUT SCIATICA: Primary | ICD-10-CM

## 2025-07-15 NOTE — PROGRESS NOTES
Patient ID: Crystal Greer is a 45 y.o. female.    E-Visit Time Tracking:   Day 1 Time (in minutes): 11  Total Time (in minutes): 11    Chief Complaint: General Illness (Entered automatically based on patient selection in [employer].)      The patient initiated a request through [employer] on 7/15/2025 for evaluation and management with a chief complaint of General Illness (Entered automatically based on patient selection in [employer].)     I evaluated the questionnaire submission on 07/15/2025.    [facility] P[facility] Evisit [facility]-Muscle,Back,Joint    7/15/2025  3:44 PM CDT - Filed by Patient   What do you need help with? Back Problem   Do you agree to participate in an E-Visit? Yes   If you have any of the following symptoms, please present to your local emergency room or call 911: I acknowledge   Do you have any of the following pregnancy-related conditions? (Pregnant, Possibly pregnant, Breast feeding, None) None   What is the main issue you would like addressed today? The lower back pain that her and i discussed per my last visit   Where is your back pain located? (Upper Back, Middle Back, Lower Back, Buttocks) Lower Back   Does the pain extend into your legs? (Left leg, Right leg, Both legs, None) Left leg   On a scale of 1-10, where 10 is the worst pain imaginable, how severe is your back pain? 10   Did you have an injury that could have caused the pain? No   How long have you had back pain? (Just today, About a week, About a month, More than a month) About a month   Have you experienced similar back pain in the past? (Yes, Sometimes, This pain is new, Never) This pain is new   Please list any medications or treatments you have used for back pain and indicate if it was effective or not. Muscle relaxers, hydrocodone and ibuprofen   Do you have a fever? (101°F or under, Over 101°F, No, Not sure) No   Do you have any of the following? (Areas that are numb, tingle, or feel strange, Discomfort or trouble when urinating,  Fatigue, New loss of bowel or bladder control, Loss of appetite, Unexpected weight loss, Weakness, Rash in area of pain, None) Areas that are numb, tingle, or feel strange;  Discomfort or trouble when urinating;  Fatigue;  Weakness   What makes the pain worse? (Any movement, Bending over, Sitting down, Intense activity, Other, None of the above) Bending over;  Sitting down   What makes the pain better? (Hot or cold compress, Leaning forward, Lying in bed, Over the counter pain medicine, Prescription pain medicine, Other, None) Hot or cold compress;  Lying in bed;  Prescription pain medicine   Have you ever been diagnosed with cancer? No   Have you ever been diagnosed with arthritis of the spine (also called degenerative disc disease)? No   Have you ever been diagnosed with osteoporosis or any other condition that causes weak bones? No   Have you ever had surgery on your back or spine? Yes   How would you describe your usual activity level? (Active - regular exercise/physical activity, Moderately active - some movement, but no regular exercise, Sedentary - mostly sitting, little activity, Limited mobility - restricted movement) Moderately active - some movement, but no regular exercise   Provide any information you feel is important to your history not asked above Pain medication gives minimal relief   Please attach any relevant images or files    Are you able to take your vitals? No         Encounter Diagnosis   Name Primary?    Acute right-sided low back pain without sciatica Yes        No orders of the defined types were placed in this encounter.           No follow-ups on file.

## 2025-07-16 RX ORDER — HYDROCODONE BITARTRATE AND ACETAMINOPHEN 10; 325 MG/1; MG/1
1 TABLET ORAL EVERY 6 HOURS PRN
Qty: 30 TABLET | Refills: 0 | Status: SHIPPED | OUTPATIENT
Start: 2025-07-16

## 2025-07-16 RX ORDER — NABUMENTONE 750 MG/1
750 TABLET ORAL 2 TIMES DAILY PRN
Qty: 60 TABLET | Refills: 0 | Status: SHIPPED | OUTPATIENT
Start: 2025-07-16

## 2025-07-16 RX ORDER — CYCLOBENZAPRINE HCL 10 MG
10 TABLET ORAL 3 TIMES DAILY PRN
Qty: 30 TABLET | Refills: 0 | Status: SHIPPED | OUTPATIENT
Start: 2025-07-16

## 2025-07-18 ENCOUNTER — HOSPITAL ENCOUNTER (OUTPATIENT)
Dept: RADIOLOGY | Facility: HOSPITAL | Age: 45
Discharge: HOME | End: 2025-07-18
Attending: INTERNAL MEDICINE
Payer: COMMERCIAL

## 2025-07-18 DIAGNOSIS — Z12.31 ENCOUNTER FOR SCREENING MAMMOGRAM FOR BREAST CANCER: ICD-10-CM

## 2025-07-18 PROCEDURE — 77067 SCR MAMMO BI INCL CAD: CPT | Mod: TC

## 2025-07-18 PROCEDURE — 77067 SCR MAMMO BI INCL CAD: CPT | Mod: 26,,, | Performed by: RADIOLOGY

## 2025-07-18 PROCEDURE — 77063 BREAST TOMOSYNTHESIS BI: CPT | Mod: 26,,, | Performed by: RADIOLOGY

## 2025-07-30 ENCOUNTER — OFFICE VISIT (OUTPATIENT)
Dept: NEUROSURGERY | Facility: CLINIC | Age: 45
End: 2025-07-30
Payer: COMMERCIAL

## 2025-07-30 VITALS
DIASTOLIC BLOOD PRESSURE: 87 MMHG | SYSTOLIC BLOOD PRESSURE: 124 MMHG | BODY MASS INDEX: 34.19 KG/M2 | HEART RATE: 104 BPM | HEIGHT: 58 IN

## 2025-07-30 DIAGNOSIS — M54.50 ACUTE RIGHT-SIDED LOW BACK PAIN WITHOUT SCIATICA: ICD-10-CM

## 2025-07-30 PROCEDURE — 3079F DIAST BP 80-89 MM HG: CPT | Mod: CPTII,S$GLB,, | Performed by: NURSE PRACTITIONER

## 2025-07-30 PROCEDURE — 3074F SYST BP LT 130 MM HG: CPT | Mod: CPTII,S$GLB,, | Performed by: NURSE PRACTITIONER

## 2025-07-30 PROCEDURE — 99204 OFFICE O/P NEW MOD 45 MIN: CPT | Mod: S$GLB,,, | Performed by: NURSE PRACTITIONER

## 2025-07-30 PROCEDURE — 3008F BODY MASS INDEX DOCD: CPT | Mod: CPTII,S$GLB,, | Performed by: NURSE PRACTITIONER

## 2025-07-30 PROCEDURE — 1159F MED LIST DOCD IN RCRD: CPT | Mod: CPTII,S$GLB,, | Performed by: NURSE PRACTITIONER

## 2025-07-30 PROCEDURE — 3044F HG A1C LEVEL LT 7.0%: CPT | Mod: CPTII,S$GLB,, | Performed by: NURSE PRACTITIONER

## 2025-07-30 RX ORDER — GABAPENTIN 100 MG/1
100 CAPSULE ORAL 2 TIMES DAILY
Qty: 60 CAPSULE | Refills: 1 | Status: SHIPPED | OUTPATIENT
Start: 2025-07-30 | End: 2025-09-28

## 2025-07-30 NOTE — PROGRESS NOTES
Neurosurgery History & Physical    Patient ID: Crystal Greer is a 45 y.o. female.    No chief complaint on file.      History of Present Illness:   45-year-old female with recent 2 level ACDF (5/2025 with Dr. Melgoza) who presents today for evaluation of low back pain with left lower extremity pain and numbness.  She reports this has been ongoing for the last several weeks without inciting trauma or injury.  She has discussed with her PCP, neurosurgeon and neurologist without clear guidance and was anxious given her recent surgery.  She elected to get a 2nd opinion on her PCPs advice.    She reports the pain is in her low back down the anterolateral left thigh.  This is described as paresthesias and numbness.  She is taken a steroid pack and an IM injection as well as muscle relaxers and gabapentin.  She had an HARSHAD with her PM&R doctor without relief.  He recommended lateral femoral cutaneous injections as the next step.    She is not back at work after her recent neck surgery and is nervous about injuring herself further in regards to her lumbar spine.  She has been doing postop therapy.    She has an MRI for review today.    Review of Systems  Constitutional: no fever, chills or night sweats. No changes in weight   Eyes: no visual changes   ENT: no nasal congestion or sore throat   Respiratory: no cough or shortness of breath   Cardiovascular: no chest pain or palpitations   Gastrointestinal: no nausea or vomiting   Genitourinary: no hematuria or dysuria   Integument/Breast: no rash or pruritis   Hematologic/Lymphatic: no easy bruising or lymphadenopathy   Musculoskeletal: no arthralgias or myalgias.   Neurological: +LBP. +numbness. no seizures or tremors   Behavioral/Psych: no auditory or visual hallucinations   Endocrine: no heat or cold intolerance     Past Medical History:   Diagnosis Date    Headache      Social History[1]  Family History   Problem Relation Name Age of Onset    Hypertension Mother    "   No Known Problems Father      No Known Problems Sister      No Known Problems Brother      Diabetes Maternal Grandmother grandmother sanju     Stroke Maternal Grandmother grandmother sanju     Aneurysm Maternal Grandfather      Dementia Paternal Grandmother       Review of patient's allergies indicates:   Allergen Reactions    Codeine Hives     Current Medications[2]  Blood pressure 124/87, pulse 104, height 4' 10" (1.473 m), last menstrual period 06/25/2018.      Neurological Exam  General: well developed, well nourished, no distress.   Neurologic: Alert and oriented. Thought content appropriate.  Cranial nerves: face symmetric, EOMI.   Motor Strength: Moves all extremities spontaneously with good tone. No abnormal movements seen.          Iliopsoas Quadriceps Knee  Flexion Tibialis  anterior Gastro- cnemius EHL   Lower: R 5/5 5/5 5/5 5/5 5/5 5/5     L 5/5 5/5 5/5 5/5 5/5 5/5      - SLR    Gait: normal    Sensory: intact to light touch throughout  DTR's - 2+ and symmetric in LE  Clonus: absent  Pulm: Normal respiratory effort  Skin: Intact, no visible rashes or lesions     Imaging:   MRI lumbar spine dated 07/07/2025:  FINDINGS:  Alignment: Normal.     Vertebrae: Degenerative endplate changes with hyperintense T1 and T2 signal at T11-T12.  This likely represents type 2 Modic changes.  No significant marrow edema.  No acute fracture.  Vertebral body heights are well maintained.     Discs: Mild multilevel disc space height loss and disc desiccation, most notable at L4-L5.     Cord: Normal.  Conus terminates at L2.     Degenerative findings:     T12-L1: No central canal stenosis or neural foraminal narrowing.     L1-L2: Minimal posterior disc bulge.  No central canal stenosis or neural foraminal narrowing.     L2-L3: Minimal posterior disc bulge.  Mild facet arthropathy.  No central canal stenosis or neural foraminal narrowing.     L3-L4: Small posterior disc bulge and disc desiccation.  Mild facet arthropathy.  Disc " extends approximately 3 mm into the central canal.  No significant central canal stenosis or neural foraminal narrowing.     L4-L5: Small posterior disc bulge and disc desiccation.  Disc extends approximately 3 mm into the central canal.  Mild facet arthropathy.  Minimal left greater than right neural foraminal narrowing.  Mild narrowing of the left lateral recess.  No significant central canal stenosis.     L5-S1: Small posterior disc bulge.  Mild facet arthropathy.  No significant central canal stenosis or neural foraminal narrowing.     Paraspinal muscles & soft tissues: Suggestion of minimal edema in the left paraspinal/psoas muscle at L2-L3.  Otherwise, no significant abnormality.     Impression:     Mild degenerative changes in the lumbar spine.  No acute fracture or significant central canal stenosis.     Assessment & Plan:   45-year-old 2 months s/p two-level ACDF with an outside surgeon who presents with low back and left lower extremity symptoms.  We discussed imaging findings.  I recommended she cooperate low back therapy into her current regimen.  We discussed incorporating 100 mg gabapentin during the day to her nighttime dose of 300 mg as she can not tolerate this high of a dose during the day.  I want to avoid any anti-inflammatories as she is not fully fused from her recent cervical surgery.  I assured her that she is in no immediate danger given her symptoms and imaging findings.  However if symptoms progress she can contact us will follow-up PRN.         [1]   Social History  Socioeconomic History    Marital status: Single   Tobacco Use    Smoking status: Never    Smokeless tobacco: Never   Substance and Sexual Activity    Alcohol use: Not Currently     Alcohol/week: 1.0 standard drink of alcohol     Types: 1 Glasses of wine per week     Comment: occasionally    Drug use: Never    Sexual activity: Yes     Partners: Male     Birth control/protection: See Surgical Hx     Social Drivers of Health      Financial Resource Strain: Low Risk  (2/18/2025)    Overall Financial Resource Strain (CARDIA)     Difficulty of Paying Living Expenses: Not hard at all   Food Insecurity: No Food Insecurity (5/21/2025)    Received from [facility] [place]    Hunger Vital Sign     Worried About Running Out of Food in the Last Year: Never true     Ran Out of Food in the Last Year: Never true   Transportation Needs: No Transportation Needs (5/21/2025)    Received from [facility]    PRAPARE - Transportation     Lack of Transportation (Medical): No     Lack of Transportation (Non-Medical): No   Physical Activity: Unknown (5/21/2025)    Received from [facility]    Exercise Vital Sign     Days of Exercise per Week: Patient declined   Stress: Stress Concern Present (2/18/2025)    [place] [employer] [facility] [employer] - Occupational Stress Questionnaire     Feeling of Stress : Very much   Housing Stability: Low Risk  (5/21/2025)    Received from [facility] [place]    Housing Stability Vital Sign     Unable to Pay for Housing in the Last Year: No     Number of Times Moved in the Last Year: 0     Homeless in the Last Year: No   [2]   Current Outpatient Medications:     amLODIPine (NORVASC) 5 MG tablet, , Disp: , Rfl:     buPROPion (WELLBUTRIN XL) 150 MG TB24 tablet, Take 1 tablet (150 mg total) by mouth once daily., Disp: 90 tablet, Rfl: 1    cyclobenzaprine (FLEXERIL) 10 MG tablet, Take 1 tablet (10 mg total) by mouth 3 (three) times daily as needed for Muscle spasms., Disp: 30 tablet, Rfl: 0    DULoxetine (CYMBALTA) 30 MG capsule, , Disp: , Rfl:     gabapentin (NEURONTIN) 300 MG capsule, Take 1 capsule (300 mg total) by mouth 3 (three) times daily., Disp: 90 capsule, Rfl: 1    HYDROcodone-acetaminophen (NORCO)  mg per tablet, Take 1 tablet by mouth every 6 (six) hours as needed for Pain., Disp: 30 tablet, Rfl: 0    MULTIVITAMIN ORAL, Take by mouth., Disp: , Rfl:     omega-3s-dha-epa-fish oil-D3 1,250 mg-1,375 mg-25 mcg Cap, Take 1 capsule  by mouth Daily., Disp: 30 capsule, Rfl: 11    potassium chloride (MICRO-K) 10 MEQ CpSR, Take 1 capsule (10 mEq total) by mouth once daily., Disp: 30 capsule, Rfl: 11    rosuvastatin (CRESTOR) 40 MG Tab, Take 1 tablet (40 mg total) by mouth every evening., Disp: 90 tablet, Rfl: 1    nabumetone (RELAFEN) 750 MG tablet, Take 1 tablet (750 mg total) by mouth 2 (two) times daily as needed for Pain. (Patient not taking: Reported on 7/30/2025), Disp: 60 tablet, Rfl: 0    phenoL (CHLORASEPTIC) 1.4 % SprA, Take 2 sprays by mouth. (Patient not taking: Reported on 7/30/2025), Disp: , Rfl:

## 2025-08-07 ENCOUNTER — TELEPHONE (OUTPATIENT)
Dept: FAMILY MEDICINE | Facility: CLINIC | Age: 45
End: 2025-08-07
Payer: COMMERCIAL

## 2025-08-07 ENCOUNTER — APPOINTMENT (OUTPATIENT)
Dept: LAB | Facility: HOSPITAL | Age: 45
End: 2025-08-07
Attending: INTERNAL MEDICINE
Payer: COMMERCIAL

## 2025-08-15 ENCOUNTER — LAB VISIT (OUTPATIENT)
Dept: LAB | Facility: HOSPITAL | Age: 45
End: 2025-08-15
Attending: STUDENT IN AN ORGANIZED HEALTH CARE EDUCATION/TRAINING PROGRAM
Payer: COMMERCIAL

## 2025-08-15 ENCOUNTER — OFFICE VISIT (OUTPATIENT)
Dept: HEMATOLOGY/ONCOLOGY | Facility: CLINIC | Age: 45
End: 2025-08-15
Payer: COMMERCIAL

## 2025-08-15 VITALS
TEMPERATURE: 99 F | HEIGHT: 58 IN | OXYGEN SATURATION: 98 % | DIASTOLIC BLOOD PRESSURE: 82 MMHG | BODY MASS INDEX: 33.93 KG/M2 | HEART RATE: 112 BPM | WEIGHT: 161.63 LBS | SYSTOLIC BLOOD PRESSURE: 129 MMHG

## 2025-08-15 DIAGNOSIS — Z76.89 ENCOUNTER TO ESTABLISH CARE: ICD-10-CM

## 2025-08-15 DIAGNOSIS — T14.8XXA BRUISING: ICD-10-CM

## 2025-08-15 DIAGNOSIS — T14.8XXA BRUISING: Primary | ICD-10-CM

## 2025-08-15 LAB
EPI, PLATELET FUNCTION ASSAY (OHS): 247 SECS (ref 76–199)
FERRITIN SERPL-MCNC: 71.1 NG/ML (ref 20–300)
FOLATE SERPL-MCNC: 13.4 NG/ML (ref 4–24)
IRON SATN MFR SERPL: 15 % (ref 20–50)
IRON SERPL-MCNC: 57 UG/DL (ref 30–160)
Lab: 112 SEC (ref 59–120)
TIBC SERPL-MCNC: 373 UG/DL (ref 250–450)
TRANSFERRIN SERPL-MCNC: 252 MG/DL (ref 200–375)

## 2025-08-15 PROCEDURE — 83921 ORGANIC ACID SINGLE QUANT: CPT

## 2025-08-15 PROCEDURE — 82728 ASSAY OF FERRITIN: CPT

## 2025-08-15 PROCEDURE — 85576 BLOOD PLATELET AGGREGATION: CPT | Mod: 59

## 2025-08-15 PROCEDURE — 83540 ASSAY OF IRON: CPT

## 2025-08-15 PROCEDURE — 82746 ASSAY OF FOLIC ACID SERUM: CPT

## 2025-08-15 PROCEDURE — 99999 PR PBB SHADOW E&M-EST. PATIENT-LVL IV: CPT | Mod: PBBFAC,,, | Performed by: STUDENT IN AN ORGANIZED HEALTH CARE EDUCATION/TRAINING PROGRAM

## 2025-08-15 PROCEDURE — 84630 ASSAY OF ZINC: CPT

## 2025-08-15 PROCEDURE — 82525 ASSAY OF COPPER: CPT

## 2025-08-15 PROCEDURE — 36415 COLL VENOUS BLD VENIPUNCTURE: CPT

## 2025-08-18 DIAGNOSIS — M54.50 ACUTE RIGHT-SIDED LOW BACK PAIN WITHOUT SCIATICA: ICD-10-CM

## 2025-08-18 DIAGNOSIS — M46.1 SACROILIITIS: ICD-10-CM

## 2025-08-18 RX ORDER — NABUMENTONE 750 MG/1
TABLET ORAL
Qty: 60 TABLET | Refills: 0 | Status: SHIPPED | OUTPATIENT
Start: 2025-08-18

## 2025-08-19 LAB — W METHYLMALONIC ACID: 0.23 UMOL/L

## 2025-08-20 LAB
W COPPER: 1011 UG/L
W ZINC: 83 UG/DL

## 2025-08-23 ENCOUNTER — E-VISIT (OUTPATIENT)
Dept: FAMILY MEDICINE | Facility: CLINIC | Age: 45
End: 2025-08-23
Payer: COMMERCIAL

## 2025-08-23 DIAGNOSIS — E78.2 MIXED HYPERLIPIDEMIA: ICD-10-CM

## 2025-08-23 DIAGNOSIS — G95.20 CERVICAL SPINAL CORD COMPRESSION: ICD-10-CM

## 2025-08-23 DIAGNOSIS — M54.50 CHRONIC RIGHT-SIDED LOW BACK PAIN WITHOUT SCIATICA: ICD-10-CM

## 2025-08-23 DIAGNOSIS — M50.30 DEGENERATIVE DISC DISEASE, CERVICAL: ICD-10-CM

## 2025-08-23 DIAGNOSIS — F32.9 REACTIVE DEPRESSION: ICD-10-CM

## 2025-08-23 DIAGNOSIS — G89.29 CHRONIC RIGHT-SIDED LOW BACK PAIN WITHOUT SCIATICA: ICD-10-CM

## 2025-08-23 DIAGNOSIS — I10 PRIMARY HYPERTENSION: Primary | ICD-10-CM

## 2025-08-23 DIAGNOSIS — E87.6 HYPOKALEMIA: ICD-10-CM

## 2025-08-24 RX ORDER — AMLODIPINE BESYLATE 5 MG/1
5 TABLET ORAL DAILY
Qty: 90 TABLET | Refills: 0 | Status: SHIPPED | OUTPATIENT
Start: 2025-08-24

## 2025-08-24 RX ORDER — DULOXETIN HYDROCHLORIDE 30 MG/1
30 CAPSULE, DELAYED RELEASE ORAL 2 TIMES DAILY
Qty: 180 CAPSULE | Refills: 0 | Status: SHIPPED | OUTPATIENT
Start: 2025-08-24

## 2025-08-25 RX ORDER — POTASSIUM CHLORIDE 750 MG/1
10 CAPSULE, EXTENDED RELEASE ORAL DAILY
Qty: 90 CAPSULE | Refills: 1 | Status: SHIPPED | OUTPATIENT
Start: 2025-08-25

## 2025-08-25 RX ORDER — ROSUVASTATIN CALCIUM 40 MG/1
40 TABLET, COATED ORAL NIGHTLY
Qty: 90 TABLET | Refills: 1 | Status: SHIPPED | OUTPATIENT
Start: 2025-08-25

## 2025-08-25 RX ORDER — BUPROPION HYDROCHLORIDE 150 MG/1
150 TABLET ORAL DAILY
Qty: 90 TABLET | Refills: 1 | Status: SHIPPED | OUTPATIENT
Start: 2025-08-25

## 2025-08-26 ENCOUNTER — PATIENT OUTREACH (OUTPATIENT)
Facility: OTHER | Age: 45
End: 2025-08-26
Payer: COMMERCIAL

## 2025-08-26 ENCOUNTER — OCHSNER VIRTUAL EMERGENCY DEPARTMENT (OUTPATIENT)
Facility: CLINIC | Age: 45
End: 2025-08-26
Payer: COMMERCIAL

## 2025-08-26 ENCOUNTER — NURSE TRIAGE (OUTPATIENT)
Dept: ADMINISTRATIVE | Facility: CLINIC | Age: 45
End: 2025-08-26
Payer: COMMERCIAL

## 2025-08-26 ENCOUNTER — TELEPHONE (OUTPATIENT)
Dept: FAMILY MEDICINE | Facility: CLINIC | Age: 45
End: 2025-08-26
Payer: COMMERCIAL

## 2025-08-26 DIAGNOSIS — R42 DIZZINESS: Primary | ICD-10-CM

## 2025-09-04 ENCOUNTER — OFFICE VISIT (OUTPATIENT)
Dept: FAMILY MEDICINE | Facility: CLINIC | Age: 45
End: 2025-09-04
Payer: COMMERCIAL

## 2025-09-04 VITALS
DIASTOLIC BLOOD PRESSURE: 80 MMHG | TEMPERATURE: 98 F | BODY MASS INDEX: 33.87 KG/M2 | OXYGEN SATURATION: 97 % | WEIGHT: 161.38 LBS | SYSTOLIC BLOOD PRESSURE: 120 MMHG | RESPIRATION RATE: 16 BRPM | HEART RATE: 98 BPM | HEIGHT: 58 IN

## 2025-09-04 DIAGNOSIS — R35.89 POLYURIA: ICD-10-CM

## 2025-09-04 DIAGNOSIS — R00.0 TACHYCARDIA: ICD-10-CM

## 2025-09-04 DIAGNOSIS — R63.1 POLYDIPSIA: ICD-10-CM

## 2025-09-04 DIAGNOSIS — E87.6 HYPOKALEMIA: Primary | ICD-10-CM

## 2025-09-04 PROCEDURE — 99999 PR PBB SHADOW E&M-EST. PATIENT-LVL V: CPT | Mod: PBBFAC,,, | Performed by: NURSE PRACTITIONER

## 2025-09-04 PROCEDURE — 99215 OFFICE O/P EST HI 40 MIN: CPT | Mod: S$GLB,,, | Performed by: NURSE PRACTITIONER

## 2025-09-04 PROCEDURE — 3008F BODY MASS INDEX DOCD: CPT | Mod: CPTII,S$GLB,, | Performed by: NURSE PRACTITIONER

## 2025-09-04 PROCEDURE — 1159F MED LIST DOCD IN RCRD: CPT | Mod: CPTII,S$GLB,, | Performed by: NURSE PRACTITIONER

## 2025-09-04 PROCEDURE — 3079F DIAST BP 80-89 MM HG: CPT | Mod: CPTII,S$GLB,, | Performed by: NURSE PRACTITIONER

## 2025-09-04 PROCEDURE — 3044F HG A1C LEVEL LT 7.0%: CPT | Mod: CPTII,S$GLB,, | Performed by: NURSE PRACTITIONER

## 2025-09-04 PROCEDURE — 1160F RVW MEDS BY RX/DR IN RCRD: CPT | Mod: CPTII,S$GLB,, | Performed by: NURSE PRACTITIONER

## 2025-09-04 PROCEDURE — 3074F SYST BP LT 130 MM HG: CPT | Mod: CPTII,S$GLB,, | Performed by: NURSE PRACTITIONER

## 2025-09-04 RX ORDER — METHOCARBAMOL 750 MG/1
TABLET, FILM COATED ORAL
COMMUNITY
Start: 2025-08-24